# Patient Record
Sex: MALE | Race: WHITE | NOT HISPANIC OR LATINO
[De-identification: names, ages, dates, MRNs, and addresses within clinical notes are randomized per-mention and may not be internally consistent; named-entity substitution may affect disease eponyms.]

---

## 2017-01-31 ENCOUNTER — APPOINTMENT (OUTPATIENT)
Dept: COLORECTAL SURGERY | Facility: CLINIC | Age: 62
End: 2017-01-31

## 2017-02-14 ENCOUNTER — APPOINTMENT (OUTPATIENT)
Dept: COLORECTAL SURGERY | Facility: CLINIC | Age: 62
End: 2017-02-14

## 2017-03-02 ENCOUNTER — APPOINTMENT (OUTPATIENT)
Dept: COLORECTAL SURGERY | Facility: CLINIC | Age: 62
End: 2017-03-02

## 2017-04-04 ENCOUNTER — APPOINTMENT (OUTPATIENT)
Dept: COLORECTAL SURGERY | Facility: CLINIC | Age: 62
End: 2017-04-04

## 2019-02-12 ENCOUNTER — APPOINTMENT (OUTPATIENT)
Dept: COLORECTAL SURGERY | Facility: CLINIC | Age: 64
End: 2019-02-12
Payer: COMMERCIAL

## 2019-02-12 PROCEDURE — 99213 OFFICE O/P EST LOW 20 MIN: CPT | Mod: 25

## 2019-02-12 PROCEDURE — 46221 LIGATION OF HEMORRHOID(S): CPT

## 2019-02-12 RX ORDER — PRAMOXINE HYDROCHLORIDE AND HYDROCORTISONE ACETATE 10; 25 MG/G; MG/G
2.5-1 CREAM TOPICAL
Qty: 1 | Refills: 5 | Status: DISCONTINUED | COMMUNITY
Start: 2017-03-02 | End: 2019-02-12

## 2019-02-12 NOTE — ASSESSMENT
[FreeTextEntry1] : Prolapsing internal hemorrhoids\par -Rubber band ligation was performed on the right lateral internal hemorrhoid without complication\par -Restart fiber supplementation\par -Follow up in office in 2-3 weeks for additional banding

## 2019-02-19 ENCOUNTER — TRANSCRIPTION ENCOUNTER (OUTPATIENT)
Age: 64
End: 2019-02-19

## 2019-02-19 ENCOUNTER — APPOINTMENT (OUTPATIENT)
Dept: COLORECTAL SURGERY | Facility: CLINIC | Age: 64
End: 2019-02-19
Payer: COMMERCIAL

## 2019-02-19 DIAGNOSIS — K62.5 HEMORRHAGE OF ANUS AND RECTUM: ICD-10-CM

## 2019-02-19 PROCEDURE — 46221 LIGATION OF HEMORRHOID(S): CPT | Mod: 58

## 2019-02-19 PROCEDURE — 99213 OFFICE O/P EST LOW 20 MIN: CPT | Mod: 25

## 2019-02-19 RX ORDER — OMEPRAZOLE 20 MG/1
20 CAPSULE, DELAYED RELEASE ORAL
Qty: 90 | Refills: 0 | Status: ACTIVE | COMMUNITY
Start: 2019-01-24

## 2019-02-19 NOTE — ASSESSMENT
[FreeTextEntry1] : Bleeding and prolapsing internal hemorrhoids\par -Rubber band ligation was performed on the right lateral internal hemorrhoid more proximally to help prevent further prolapse.  This was performed in standard fashion without complication\par -Continue fiber supplement\par -Follow up in 2 weeks for reevaluation\par -Given partial prolapse, rubber band ligation may not completely treated the patient's hemorrhoidal symptoms. If no improvement, will consider formal hemorrhoidectomy

## 2019-02-19 NOTE — HISTORY OF PRESENT ILLNESS
[FreeTextEntry1] : 63-year-old male status post rubber band ligation. Patient reports rubber bands fell off somewhere on the second day. He reports further prolapse. Mild discomfort. No fevers or chills. Presents for evaluation

## 2019-10-12 ENCOUNTER — TRANSCRIPTION ENCOUNTER (OUTPATIENT)
Age: 64
End: 2019-10-12

## 2020-08-18 ENCOUNTER — APPOINTMENT (OUTPATIENT)
Dept: ORTHOPEDIC SURGERY | Facility: CLINIC | Age: 65
End: 2020-08-18
Payer: COMMERCIAL

## 2020-08-18 VITALS
HEART RATE: 74 BPM | DIASTOLIC BLOOD PRESSURE: 69 MMHG | BODY MASS INDEX: 25.03 KG/M2 | HEIGHT: 74 IN | WEIGHT: 195 LBS | SYSTOLIC BLOOD PRESSURE: 144 MMHG | TEMPERATURE: 97.7 F

## 2020-08-18 PROCEDURE — 99203 OFFICE O/P NEW LOW 30 MIN: CPT

## 2020-08-18 PROCEDURE — 73564 X-RAY EXAM KNEE 4 OR MORE: CPT | Mod: LT

## 2020-08-19 NOTE — PHYSICAL EXAM
[de-identified] : 63 y/o pleasant  male in NAD and AAOx3. 25 BMI. The pt has a mildly antalgic gait. Physical examination of both knees reveals normal contours, skin intact with no signs of infection, no erythema, no swelling, no effusion, no distal lymphedema or phlebitis, no patholaxity. ROM of the left knee reveals 0°-110° (pain around after 100 degrees). Strength is 5/5 within this arc of motion. There is pain on palpation to the medial joint line. There are no neurological deficits.\par  [de-identified] : X-rays were ordered, obtained, and interpreted by me today: 4 views of the left knee demonstrate mild diffuse tricompartmental osteoarthritis that is most pronounced in the patellofemoral joint with lateral patellar tilt and lateral PF joint space narrowing, with no acute fracture or dislocation.\par

## 2020-08-19 NOTE — DISCUSSION/SUMMARY
[de-identified] : 63 y/o male with left knee pain secondary to likely a medial meniscal tear.  A lengthy discussion was held regarding the patients condition and treatment options including all risks, benefits, prognosis and outcomes of each were discussed in detail. Based on his mechanism and description of symptoms he likely sustained a medial meniscal tear. I have advised on an MRI to further evaluate this. I will call him with the results of the MRI. We did have a hypothetical discussion today regarding addressing the tear arthroscopically. He is an avid boater and may hold off until around November to address surgically, should he have a meniscal tear as we suspect. The patient will contact me if there are any concerns. The patient expressed understanding and all questions were answered.\par

## 2020-08-19 NOTE — CONSULT LETTER
[Dear  ___] : Dear  [unfilled], [FreeTextEntry1] : I had the pleasure of evaluating your patient in the office today for complaints of left knee pain secondary to likely a meniscal tear. I have enclosed a copy of today's office notes for your charts and for your review.\par \par Sincerely, \par \par John Orozco M.D.\par Professor and \par Department of Orthopedic Surgery\par [facility] [place]\par

## 2020-08-19 NOTE — REVIEW OF SYSTEMS
[Joint Pain] : joint pain [Joint Swelling] : joint swelling [Negative] : Endocrine Anesthesia Volume In Cc (Will Not Render If 0): 0.5 Dressing: bandage Electrodesiccation And Curettage Text: The wound bed was treated with electrodesiccation and curettage after the biopsy was performed. Render Post-Care Instructions In Note?: no Was A Bandage Applied: Yes Hemostasis: Drysol Silver Nitrate Text: The wound bed was treated with silver nitrate after the biopsy was performed. Detail Level: Detailed Billing Type: Third-Party Bill Biopsy Method: Dermablade Lab: 6 Curettage Text: The wound bed was treated with curettage after the biopsy was performed. Post-Care Instructions: I reviewed with the patient in detail post-care instructions. Patient is to keep the biopsy site dry overnight, and then apply bacitracin twice daily until healed. Patient may apply hydrogen peroxide soaks to remove any crusting. Additional Anesthesia Volume In Cc (Will Not Render If 0): 0 Cryotherapy Text: The wound bed was treated with cryotherapy after the biopsy was performed. Anesthesia Type: 1% lidocaine with epinephrine Size Of Lesion In Cm: 0.4 Lab Facility: 3 Wound Care: Petrolatum Notification Instructions: Patient will be notified of biopsy results. However, patient instructed to call the office if not contacted within 2 weeks. Depth Of Biopsy: dermis Electrodesiccation Text: The wound bed was treated with electrodesiccation after the biopsy was performed. Consent: Written consent was obtained and risks were reviewed including but not limited to scarring, infection, bleeding, scabbing, incomplete removal, nerve damage and allergy to anesthesia. Biopsy Type: H and E Type Of Destruction Used: Curettage

## 2020-08-19 NOTE — HISTORY OF PRESENT ILLNESS
[de-identified] : 65 y/o male who is a retired  presents with left knee pain due to an injury about 6 weeks ago. He states that he twisted his knee during softball batting practice and while throwing a week later he aggravated his symptoms. He states that his symptoms improved and only has pain with certain movements.  He did take Aleve or Advil but has stopped taking it. He denies any numbness or tingling.

## 2020-08-24 ENCOUNTER — OUTPATIENT (OUTPATIENT)
Dept: OUTPATIENT SERVICES | Facility: HOSPITAL | Age: 65
LOS: 1 days | End: 2020-08-24
Payer: COMMERCIAL

## 2020-08-24 ENCOUNTER — APPOINTMENT (OUTPATIENT)
Dept: MRI IMAGING | Facility: CLINIC | Age: 65
End: 2020-08-24
Payer: COMMERCIAL

## 2020-08-24 ENCOUNTER — RESULT REVIEW (OUTPATIENT)
Age: 65
End: 2020-08-24

## 2020-08-24 DIAGNOSIS — S83.249A OTHER TEAR OF MEDIAL MENISCUS, CURRENT INJURY, UNSPECIFIED KNEE, INITIAL ENCOUNTER: ICD-10-CM

## 2020-08-24 PROCEDURE — 73721 MRI JNT OF LWR EXTRE W/O DYE: CPT | Mod: 26,LT

## 2020-08-24 PROCEDURE — 73721 MRI JNT OF LWR EXTRE W/O DYE: CPT

## 2020-09-08 ENCOUNTER — APPOINTMENT (OUTPATIENT)
Dept: ORTHOPEDIC SURGERY | Facility: CLINIC | Age: 65
End: 2020-09-08

## 2020-09-18 ENCOUNTER — APPOINTMENT (OUTPATIENT)
Dept: ORTHOPEDIC SURGERY | Facility: CLINIC | Age: 65
End: 2020-09-18
Payer: COMMERCIAL

## 2020-09-18 VITALS — TEMPERATURE: 97.6 F

## 2020-09-18 DIAGNOSIS — S83.249A OTHER TEAR OF MEDIAL MENISCUS, CURRENT INJURY, UNSPECIFIED KNEE, INITIAL ENCOUNTER: ICD-10-CM

## 2020-09-18 PROCEDURE — 20610 DRAIN/INJ JOINT/BURSA W/O US: CPT | Mod: LT

## 2020-09-18 PROCEDURE — 99213 OFFICE O/P EST LOW 20 MIN: CPT | Mod: 25

## 2020-09-18 NOTE — DISCUSSION/SUMMARY
[de-identified] : 63 y/o male with left knee pain secondary to complex tear of medial meniscus. The MRI was reviewed with the patient. A lengthy discussion was held regarding the patients condition and treatment options including all risks, benefits, prognosis and outcomes of each were discussed in detail. The patient elected to pursue conservative management at this time and elected to get a cortisone injection today. We discussed that the injections may be temporizing and he may ultimately require surgery at some point in the future. The patient will contact me if there are any concerns. Follow up will be prn. The patient expressed understanding and all questions were answered.

## 2020-09-18 NOTE — PHYSICAL EXAM
[de-identified] : 63 y/o pleasant  male in NAD and AAOx3. 25 BMI. The pt has a mildly antalgic gait. Physical examination of both knees reveals normal contours, skin intact with no signs of infection, no erythema, no swelling, no effusion, no distal lymphedema or phlebitis, no patholaxity. ROM of the left knee reveals 0°-110° (pain around after 100 degrees). Strength is 5/5 within this arc of motion. There is pain on palpation to the medial joint line. There are no neurological deficits.\par  [de-identified] : Previous X-rays were reviewed by me today: 4 views of the left knee demonstrate mild diffuse tricompartmental osteoarthritis that is most pronounced in the patellofemoral joint with lateral patellar tilt and lateral PF joint space narrowing, with no acute fracture or dislocation.\par \par Previous MRI Reviewed by med today ([facility], 8/24/20): complex tear of medial meniscus.

## 2020-09-18 NOTE — PROCEDURE
[de-identified] : After careful discussion with the patient regarding the risks versus benefits of a corticosteroid and local anesthetic injection, the patient has decided to proceed ahead with the treatment. After sterile preparation of the site, an injection has been given into the left knee using 8 cc of half percent Marcaine without epinephrine and 2 cc of triamcinolone. The site was cleaned and a band-aid was applied. The patient tolerated the injection without complication\par

## 2020-09-18 NOTE — CONSULT LETTER
[Dear  ___] : Dear  [unfilled], [FreeTextEntry1] : I had the pleasure of evaluating your patient in the office today for complaints of left knee pain secondary to a medial meniscus tear. A cortisone injection was administered to the patient's left knee today. I have enclosed a copy of today's office notes for your charts and for your review.\par \par Sincerely, \par \par John Orozco M.D.\par Professor and \par Department of Orthopedic Surgery\par [facility] [place]\par

## 2020-09-18 NOTE — HISTORY OF PRESENT ILLNESS
[de-identified] : 65 y/o male who is a retired  presents with left knee pain due to an injury and was seen and was found to have a possible meniscus tear. He was sent to obtain an MRI and is here today for the results. He does not have pain at this visit. He does not take any pain meds. He denies any numbness or tingling.

## 2021-02-08 NOTE — HISTORY OF PRESENT ILLNESS
[facility] ADMISSION NOTE:  Patient enrolled in the Transition program to assist with education and support during transition home from hospital. Transition home  will see patient at home within 48 hours of discharge and will follow up with patient in home and telephonically for 6 weeks post discharge under the [place] Model.       [FreeTextEntry1] : 63-year-old male with history of internal hemorrhoidal disease. Patient had been treated with rubber band ligation in the past with good results. Reports intermittent prolapse recently. Denies pain. Denies blood per rectum. No fevers or chills

## 2022-01-23 ENCOUNTER — NON-APPOINTMENT (OUTPATIENT)
Age: 67
End: 2022-01-23

## 2022-01-24 ENCOUNTER — OUTPATIENT (OUTPATIENT)
Dept: OUTPATIENT SERVICES | Facility: HOSPITAL | Age: 67
LOS: 1 days | End: 2022-01-24
Payer: MEDICARE

## 2022-01-24 ENCOUNTER — APPOINTMENT (OUTPATIENT)
Dept: MRI IMAGING | Facility: CLINIC | Age: 67
End: 2022-01-24
Payer: MEDICARE

## 2022-01-24 ENCOUNTER — APPOINTMENT (OUTPATIENT)
Dept: ORTHOPEDIC SURGERY | Facility: CLINIC | Age: 67
End: 2022-01-24
Payer: MEDICARE

## 2022-01-24 VITALS — DIASTOLIC BLOOD PRESSURE: 81 MMHG | SYSTOLIC BLOOD PRESSURE: 129 MMHG | HEART RATE: 67 BPM

## 2022-01-24 DIAGNOSIS — M51.36 OTHER INTERVERTEBRAL DISC DEGENERATION, LUMBAR REGION: ICD-10-CM

## 2022-01-24 PROCEDURE — 72148 MRI LUMBAR SPINE W/O DYE: CPT | Mod: 26,ME

## 2022-01-24 PROCEDURE — G1004: CPT

## 2022-01-24 PROCEDURE — 99214 OFFICE O/P EST MOD 30 MIN: CPT

## 2022-01-24 PROCEDURE — 72100 X-RAY EXAM L-S SPINE 2/3 VWS: CPT

## 2022-01-24 PROCEDURE — 72148 MRI LUMBAR SPINE W/O DYE: CPT | Mod: ME

## 2022-01-31 ENCOUNTER — APPOINTMENT (OUTPATIENT)
Dept: ORTHOPEDIC SURGERY | Facility: CLINIC | Age: 67
End: 2022-01-31
Payer: MEDICARE

## 2022-01-31 VITALS
WEIGHT: 195 LBS | DIASTOLIC BLOOD PRESSURE: 78 MMHG | SYSTOLIC BLOOD PRESSURE: 128 MMHG | HEART RATE: 69 BPM | HEIGHT: 74 IN | BODY MASS INDEX: 25.03 KG/M2

## 2022-01-31 PROCEDURE — 99214 OFFICE O/P EST MOD 30 MIN: CPT

## 2022-02-12 ENCOUNTER — EMERGENCY (EMERGENCY)
Facility: HOSPITAL | Age: 67
LOS: 1 days | Discharge: HOME | End: 2022-02-12
Attending: EMERGENCY MEDICINE
Payer: MEDICARE

## 2022-02-12 VITALS
OXYGEN SATURATION: 99 % | HEART RATE: 64 BPM | DIASTOLIC BLOOD PRESSURE: 93 MMHG | WEIGHT: 194.01 LBS | HEIGHT: 74 IN | TEMPERATURE: 98 F | SYSTOLIC BLOOD PRESSURE: 151 MMHG | RESPIRATION RATE: 18 BRPM

## 2022-02-12 VITALS
RESPIRATION RATE: 16 BRPM | OXYGEN SATURATION: 96 % | DIASTOLIC BLOOD PRESSURE: 98 MMHG | SYSTOLIC BLOOD PRESSURE: 141 MMHG | HEART RATE: 88 BPM

## 2022-02-12 PROCEDURE — 72148 MRI LUMBAR SPINE W/O DYE: CPT | Mod: 26,MA

## 2022-02-12 PROCEDURE — 99284 EMERGENCY DEPT VISIT MOD MDM: CPT | Mod: 25

## 2022-02-12 PROCEDURE — 72148 MRI LUMBAR SPINE W/O DYE: CPT | Mod: MA

## 2022-02-12 PROCEDURE — 99284 EMERGENCY DEPT VISIT MOD MDM: CPT | Mod: GC

## 2022-02-12 RX ORDER — SENNOSIDES 8.6 MG/1
8.6 TABLET, COATED ORAL
Qty: 30 | Refills: 0 | Status: ACTIVE | COMMUNITY
Start: 2022-02-12 | End: 1900-01-01

## 2022-02-12 NOTE — ED PROVIDER NOTE - OBJECTIVE STATEMENT
66M PMH lumbar stenosis, BPH, GERD complaining of 1 week of worsening sharp pain radiating down b/l anterior legs, sharp L groin pain, L foot numbness, L buttock numbness, associated with constipation (last BM 1 week ago) and worsening difficulty with urination (straining, decreased bladder emptying). Recent spinal injection 10 days ago. Denies fever, chills, saddle anesthesia, chest pain, SOB, cough, headache, dizziness, diarrhea, abdominal pain. 66M PMH lumbar stenosis, BPH, GERD complaining of 1 week of worsening sharp pain radiating down b/l anterior legs, sharp L groin pain, L foot numbness, L buttock numbness, associated with constipation (last BM 1 week ago) and worsening difficulty with urination (straining, decreased bladder emptying). Recent spinal injection 10 days ago. Denies fever, chills, saddle anesthesia, chest pain, SOB, cough, headache, dizziness, diarrhea, abdominal pain. Pt passing gas. No n/v.

## 2022-02-12 NOTE — ED PROVIDER NOTE - CLINICAL SUMMARY MEDICAL DECISION MAKING FREE TEXT BOX
66M PMH lumbar stenosis, BPH, GERD complaining of 1 week of worsening sharp pain radiating down b/l anterior legs, sharp L groin pain, L foot numbness, L buttock numbness, associated with constipation (last BM 1 week ago) and worsening difficulty with urination (straining, decreased bladder emptying). Given hx and physical will eval for cord compression with MRI. PT currently denying pain, does not want pain medication. 66M PMH lumbar stenosis, BPH, GERD complaining of 1 week of worsening sharp pain radiating down b/l anterior legs, sharp L groin pain, L foot numbness, L buttock numbness, associated with constipation (last BM 1 week ago) and worsening difficulty with urination (straining, decreased bladder emptying). Given hx and physical will eval for cord compression with MRI. PT currently denying pain, does not want pain medication. Not concerned for SBO given pt passing gas, soft NT abdomen, no n/v.

## 2022-02-12 NOTE — ED ADULT NURSE NOTE - OBJECTIVE STATEMENT
Pt is a 65 y/o male pmhx of laminectomy (2001) presents to the ED complaining of back pain & no BM x 1 week. Pt has had back pain since the fall, pain was originally sharp and localized but after going to the Chiropractor pain has improved and is now in the side of hips and "causes a pulling pain down my thighs when I walk that is causing my left foot to be numb". Out patient MRI showed chronic compression deformity of L5 & spondylosis. He then had injections done 10 days ago and then in 7 days has not had a BM. Endorses worsening urinary hesitancy. He denies feeling bloated or having abdominal pain. Pt presents alert & oriented x 4, calm, able to follow commands, speech clear. Breathing spontaneous & nonlabored. Abdomen soft & nondistended. Strength 5/5 x 4 extremities, ambulates without assist. Strong peripheral pulses noted b/l, no edema noted. Skin warm, clean, dry & intact. Denies chest pain, SOB, abdominal pain, n/v/d, fever, chills, changes in vision. Call bell within reach, bed in lowest position, side rails up, wheels locked.

## 2022-02-12 NOTE — ED PROVIDER NOTE - PHYSICAL EXAMINATION
GENERAL: Awake. Alert. NAD. Well nourished.  HEENT: NC/AT, PERRL, EOMI, Conjunctiva pink, no scleral icterus. Airway patent. Moist mucous membranes.  LUNGS: CTAB. No wheezes or rales noted.  CARDIAC: Chest non-tender to palpation. RRR. S1 and S2 intact. No murmurs noted.  ABDOMEN:  No masses noted. Soft, NT, ND, no rebound, no guarding.  BACK: No midline spinal tenderness  EXT: No edema, no calf tenderness, distal pulses 2+ bilaterally,   NEURO: A&Ox3. Moving all extremities. Strength intact throughout. Negative straight leg test. No focal deficits. Decreased sensation at L foot and L buttocks. No saddle anesthesia.   SKIN: Warm and dry.  PSYCH: Normal affect.

## 2022-02-12 NOTE — ED PROVIDER NOTE - CARE PROVIDER_API CALL
Jay Kiran (MD; DC)  Orthopaedic Surgery  [address], [address]  [place] [address]  Phone: [phone]  Fax: [phone]  Follow Up Time: 1-3 Days

## 2022-02-12 NOTE — ED ADULT TRIAGE NOTE - CHIEF COMPLAINT QUOTE
progressively worsening lower back pain with constipation, last bowel movement approx 7 days ago. Pt noted to have received injection in back yesterday for pain and since has also noted intermittent numbness and tingling in buttock area.

## 2022-02-12 NOTE — CONSULT NOTE ADULT - ATTENDING COMMENTS
Agree with above. The patient is a 66 year old male with back/leg symptoms. He has been dealing with radicular type pain. He denies any urinary incontinence. He does have a previous history of hemorroids but no aneta perianal anesthesia. He has not had a BM in 7 days although he has been able to pass gas.    He is neurologically intact. His sensation is nml and he is 5/5 in his LE    I did discuss with him his current MRI findings which does show severe spinal stenosis at L1-L2. I offered to admit him and keep him under close observation for the next 24 hours. He declined this. We also discussed immediate surgery for this current pathology. He declined this as well. He would like to go home. We will send him home on a prednisone taper. He will have close clinical follow up in the office with Dr. Kiran this week. I did give him my direct contact information and counseled him that he can reach out to my at any time if he has any new or worsening symptoms.    All questions were answered.

## 2022-02-12 NOTE — ED PROVIDER NOTE - PROGRESS NOTE DETAILS
Connie Faustin DO (PGY1): Rectal exam chaperoned by Dr. Couch. Normal rectal tone. Elizabeth Mayes MD, PGY1    Patient signed out to me - will follow up MRI results Elizabeth Mayes MD, PGY1    MRI called as patient was not taken at 9AM as was signed out. Radiology called to expedite patient. Elizabeth Mayes MD, PGY1    MRI unchanged - spoke to on call physician at Dr. Kiran's (patient's spine surgeon). Patient can call to follow up Monday morning. Elizabeth Mayes MD, PGY1    Spoke to patient's nephew who is an orthopedic surgeon - requesting Spine consult. Ortho to see. No acute intervention as per spine    Elizabeth Mayes MD, PGY1

## 2022-02-12 NOTE — ED PROVIDER NOTE - NSFOLLOWUPINSTRUCTIONS_ED_ALL_ED_FT
You were seen in the Emergency Department for back pain. Your MRI was negative for changes since your last MRI. Please follow up with your spine surgeon, Dr. Kiran on Monday with an appointment. There is no acute intervention at this time. Take Tylenol 650 mg or Motrin 600 mg for pain. Please return to the Emergency Department if you have increasing difficulty urinating, defecating, increased numbness or tingling. You were seen in the Emergency Department for back pain. Your MRI was negative for changes since your last MRI. There is no acute intervention indicated, as per the spine surgeons. Please follow up with your spine surgeon, Dr. Kiran on Monday with an appointment. Take Tylenol 650 mg or Motrin 600 mg for pain. Please return to the Emergency Department if you have increasing difficulty urinating, defecating, increased numbness or tingling.    [facility]  Orthopedic Surgery  [address] [place] [address]  Phone: [phone]  Fax:   Follow Up Time:     [facility]  Orthopedic Surgery  [address], 3rd & 4th [place] [address]  Phone: [phone]  Fax:   Follow Up Time:     [facility]  Orthopedics  .  [place]   Phone: [phone]  Fax:   Follow Up Time:     [facility]  Orthopedics  [address] [place] [address]  Phone: [phone]  Fax: [phone]  Follow Up Time:     [facility]  Orthopedics  [address] [place] [address]  Phone: [phone]  Fax:   Follow Up Time:     Back Pain  WHAT YOU NEED TO KNOW:  Back pain is common. It can be caused by many conditions, such as arthritis or the breakdown of spinal discs. Your risk for back pain is increased by injuries, lack of activity, or repeated bending and twisting. You may feel sore or stiff on one or both sides of your back. The pain may spread to your buttocks or thighs.  DISCHARGE INSTRUCTIONS:  Return to the emergency department if:   •You have pain, numbness, or weakness in one or both legs.  •Your pain becomes so severe that you cannot walk.  •You cannot control your urine or bowel movements.  •You have severe back pain with chest pain.  •You have severe back pain, nausea, and vomiting.  •You have severe back pain that spreads to your side or genital area.  Contact your healthcare provider if:   •You have back pain that does not get better with rest and pain medicine.  •You have a fever.  •You have pain that worsens when you are on your back or when you rest.  •You have pain that worsens when you cough or sneeze.  •You lose weight without trying.  •You have questions or concerns about your condition or care.  Medicines:   •NSAIDs help decrease swelling and pain. This medicine is available with or without a doctor's order. NSAIDs can cause stomach bleeding or kidney problems in certain people. If you take blood thinner medicine, always ask your healthcare provider if NSAIDs are safe for you. Always read the medicine label and follow directions.  •Acetaminophen decreases pain and fever. It is available without a doctor's order. Ask how much to take and how often to take it. Follow directions. Read the labels of all other medicines you are using to see if they also contain acetaminophen, or ask your doctor or pharmacist. Acetaminophen can cause liver damage if not taken correctly. Do not use more than 4 grams (4,000 milligrams) total of acetaminophen in one day.   •Muscle relaxers help decrease muscle spasms and back pain.  •Prescription pain medicine may be given. Ask your healthcare provider how to take this medicine safely. Some prescription pain medicines contain acetaminophen. Do not take other medicines that contain acetaminophen without talking to your healthcare provider. Too much acetaminophen may cause liver damage. Prescription pain medicine may cause constipation. Ask your healthcare provider how to prevent or treat constipation.   •Take your medicine as directed. Contact your healthcare provider if you think your medicine is not helping or if you have side effects. Tell him or her if you are allergic to any medicine. Keep a list of the medicines, vitamins, and herbs you take. Include the amounts, and when and why you take them. Bring the list or the pill bottles to follow-up visits. Carry your medicine list with you in case of an emergency.  How to manage your back pain:   •Apply ice on your back for 15 to 20 minutes every hour or as directed. Use an ice pack, or put crushed ice in a plastic bag. Cover it with a towel before you apply it to your skin. Ice helps prevent tissue damage and decreases pain.  •Apply heat on your back for 20 to 30 minutes every 2 hours for as many days as directed. Heat helps decrease pain and muscle spasms.  •Stay active as much as you can without causing more pain. Bed rest could make your back pain worse. Avoid heavy lifting until your pain is gone.  •Go to physical therapy as directed. A physical therapist can teach you exercises to help improve movement and strength, and to decrease pain.  Follow up with your healthcare provider in 2 weeks, or as directed: Write down your questions so you remember to ask them during your visits.

## 2022-02-12 NOTE — CONSULT NOTE ADULT - TIME BILLING
Please note that over 70 minutes of time was spent in care of this patient. This includes several discussions with providers in the ED, residents, attendings. I also spent 2 separate encounters with the patient to discuss his findings/imaging/exam. I also discussed with him at length his plan of care. A signficant portion of time was also spent in documentation, previsit preparation, post visit documentation.

## 2022-02-12 NOTE — ED PROVIDER NOTE - PATIENT PORTAL LINK FT
You can access the FollowMyHealth Patient Portal offered by [facility] by registering at the following website: [email or web address]. By joining [employer]’s FollowMyHealth portal, you will also be able to view your health information using other applications (apps) compatible with our system. You can access the FollowMyHealth Patient Portal offered by [facility] by registering at the following website: [email or web address]. By joining [employer]’s FollowMyHealth portal, you will also be able to view your health information using other applications (apps) compatible with our system.

## 2022-02-12 NOTE — CONSULT NOTE ADULT - SUBJECTIVE AND OBJECTIVE BOX
Patient is a 66y Male who presents c/o constipation for the past 7 days and decreased perianal sensation. Seen by Dr. Kiran in the office, received an epidural injection 2/1/12 with minimal relief. Denies any traumatic injury. Denies pain/injury elsewhere. Denies  motor weakness. Denies bowel/bladder incontinence. Denies fevers/chills. No other complaints at this time. Patient seen/examined by Dr. Murray personally at bedside    HEALTH ISSUES - PROBLEM Dx:          MEDICATIONS  (STANDING):      Allergies    penicillin (Hives)    Intolerances        PAST MEDICAL & SURGICAL HISTORY:  Benign prostatic hyperplasia          Vital Signs Last 24 Hrs  T(C): 36.4 (02-12-22 @ 06:10), Max: 36.8 (02-12-22 @ 02:28)  T(F): 97.6 (02-12-22 @ 06:10), Max: 98.2 (02-12-22 @ 02:28)  HR: 88 (02-12-22 @ 10:00) (62 - 88)  BP: 141/98 (02-12-22 @ 10:00) (133/80 - 160/85)  BP(mean): 98 (02-12-22 @ 06:10) (98 - 110)  RR: 16 (02-12-22 @ 10:00) (16 - 18)  SpO2: 96% (02-12-22 @ 10:00) (96% - 99%)    Gen: NAD  Spine PE:  Skin intact  No gross deformity  No midline TTP C/T/L/S spine  No bony step offs  No paraspinal muscle ttp/hypertonicity   Negative clonus  Negative babinski  Negative norris  Rectal sensation present; however, altered  Good rectal tone    Motor:                   C5                C6              C7               C8           T1   R            5/5                5/5            5/5             5/5          5/5  L             5/5               5/5             5/5             5/5          5/5                L2             L3             L4               L5            S1  R         5/5           5/5          5/5             5/5           5/5  L          5/5          5/5           5/5             5/5           5/5    Sensory:            C5         C6         C7      C8       T1        (0=absent, 1=impaired, 2=normal, NT=not testable)  R         2            2           2        2         2  L          2            2           2        2         2               L2          L3         L4      L5       S1         (0=absent, 1=impaired, 2=normal, NT=not testable)  R         2            2            2        2        2  L          2            2           2        2         2                  Imaging:   MR LSp: Showing no acute changes, L1-L2 spinal stenosis, disc bulges L1-L5, L5 VCF    A/P: 66y Male with unchanged lumbar spinal stenosis, L5 VCF    Overall reassuring clinical exam: 5/5 motor strength, SILT to extremities, however does have altered perianal sensation and constipation. Advised patient to closely monitor and if he develops any symptoms of urinary/fecal incontinence or aneta perineal anesthesia should come immediately to the ER. Patient verbalized understanding and understands return precautions  Pain control  WBAT with assistive devices as needed  FU Labs/imaging  SCDs  F/u in office with Dr. Kiran   Will d/w Dr. Murray and advise of any changes

## 2022-02-14 ENCOUNTER — APPOINTMENT (OUTPATIENT)
Dept: ORTHOPEDIC SURGERY | Facility: CLINIC | Age: 67
End: 2022-02-14
Payer: MEDICARE

## 2022-02-14 VITALS
WEIGHT: 195 LBS | DIASTOLIC BLOOD PRESSURE: 75 MMHG | HEIGHT: 74 IN | BODY MASS INDEX: 25.03 KG/M2 | HEART RATE: 71 BPM | SYSTOLIC BLOOD PRESSURE: 119 MMHG

## 2022-02-14 PROBLEM — N40.0 BENIGN PROSTATIC HYPERPLASIA WITHOUT LOWER URINARY TRACT SYMPTOMS: Chronic | Status: ACTIVE | Noted: 2022-02-12

## 2022-02-14 PROCEDURE — 99215 OFFICE O/P EST HI 40 MIN: CPT

## 2022-02-18 ENCOUNTER — NON-APPOINTMENT (OUTPATIENT)
Age: 67
End: 2022-02-18

## 2022-02-23 ENCOUNTER — APPOINTMENT (OUTPATIENT)
Dept: ORTHOPEDIC SURGERY | Facility: CLINIC | Age: 67
End: 2022-02-23
Payer: MEDICARE

## 2022-02-23 VITALS
SYSTOLIC BLOOD PRESSURE: 109 MMHG | HEART RATE: 82 BPM | DIASTOLIC BLOOD PRESSURE: 76 MMHG | HEIGHT: 74 IN | WEIGHT: 195 LBS | BODY MASS INDEX: 25.03 KG/M2 | OXYGEN SATURATION: 96 %

## 2022-02-23 PROCEDURE — 99215 OFFICE O/P EST HI 40 MIN: CPT

## 2022-02-23 NOTE — ASSESSMENT
[FreeTextEntry1] : I had a long discussion with the patient regards to his treatment plan and diagnosis.  I do think that he is dealing with lumbar radiculopathy, lumbar neurogenic claudication.  He has tried and failed conservative management including prednisone, Tylenol, epidural steroid injection.  He is continuing dealing with significant symptoms.  His MRI does show severe spinal stenosis L1-L4.  At this point I would recommend to him in a lumbar decompression surgery, L1-L4.  He would like to get another opinion which I think is reasonable.  I have counseled him that we can hold the date of March time for surgery but if he decides to proceed with another surgeon that is fine.  I did give him my direct contact information I encouraged him to reach out to me at any point if he has any new or worsening symptoms.

## 2022-02-23 NOTE — HISTORY OF PRESENT ILLNESS
[de-identified] : This is a 66-year-old male with a known history of lumbar spinal stenosis.  He presents with significant pain in his legs.  The majority of his pain is above his knee.  He does feel fatigued when he walks for prolonged amount of time.  He also has numbness over his left groin.  He also has numbness over his left foot.  He does occasionally have feelings that it is hard for him to have a bowel movement.  Thankfully he is able to control when he starts to stop urination.  He has no numbness over his penis or anus today.  He is able to walk he has no focal neurologic deficits

## 2022-02-23 NOTE — PHYSICAL EXAM
[de-identified] : Lumbar radiographs\par Coronal degenerative scoliosis noted\par No severe spondylolisthesis\par \par Lumbar MRI\par Severe spinal stenosis at L1-L2\par L2-L4 with moderate to severe areas of spinal stenosis [de-identified] : Lumbar Physical Exam\par \par Gait - Normal\par \par Station - Normal\par \par Sagittal balance - Normal\par \par Compensatory mechanism? - None\par \par Heel walk - Normal\par \par Toe walk - Normal\par \par Reflexes\par Patellar - normal\par Gastroc - normal\par Clonus - No\par \par Hip Exam - Normal\par \par Straight leg raise - none\par \par Pulses - 2+ dp/pt\par \par Range of motion - normal\par \par Sensation \par Sensation intact to light touch in L1, L2, L3, L4, L5 and S1 dermatomes bilaterally, left foot numbness\par \par Motor\par 	IP	Quad	HS	TA	Gastroc	EHL\par Right	5/5	5/5	5/5	5/5	5/5	5/5\par Left	5/5	5/5	5/5	5/5	5/5	5/5

## 2022-02-24 ENCOUNTER — APPOINTMENT (OUTPATIENT)
Dept: COLORECTAL SURGERY | Facility: CLINIC | Age: 67
End: 2022-02-24
Payer: MEDICARE

## 2022-02-24 DIAGNOSIS — K64.5 PERIANAL VENOUS THROMBOSIS: ICD-10-CM

## 2022-02-24 DIAGNOSIS — K62.89 OTHER SPECIFIED DISEASES OF ANUS AND RECTUM: ICD-10-CM

## 2022-02-24 DIAGNOSIS — K64.9 UNSPECIFIED HEMORRHOIDS: ICD-10-CM

## 2022-02-24 PROCEDURE — 99213 OFFICE O/P EST LOW 20 MIN: CPT | Mod: 25

## 2022-02-24 PROCEDURE — 46600 DIAGNOSTIC ANOSCOPY SPX: CPT

## 2022-02-24 NOTE — ASSESSMENT
[FreeTextEntry1] : Resolving thrombosed external hemorrhoid\par -Patient currently asymptomatic and has started conservative therapy\par -Continue fiber supplementation daily\par -Hydrocortisone cream as needed\par -Sitz bath as needed\par -Follow up if worsening symptoms

## 2022-02-24 NOTE — HISTORY OF PRESENT ILLNESS
[FreeTextEntry1] : 66-year-old male is status post rubber band ligation approximately 3 years ago. Patient had been progressing well. Worsening back pain has been noted he underwent a spinal for pain control on February 1. After that time, he reports severe constipation with perianal swelling and pain. He denies blood per rectum no abdominal discomfort no fevers or chills. The perianal swelling worsened with his first bowel movement. He started taking fiber supplementation and feels significantly better presents today for evaluation

## 2022-03-02 ENCOUNTER — OUTPATIENT (OUTPATIENT)
Dept: OUTPATIENT SERVICES | Facility: HOSPITAL | Age: 67
LOS: 1 days | End: 2022-03-02
Payer: MEDICARE

## 2022-03-02 ENCOUNTER — APPOINTMENT (OUTPATIENT)
Dept: SPINE | Facility: CLINIC | Age: 67
End: 2022-03-02
Payer: MEDICARE

## 2022-03-02 VITALS
WEIGHT: 197.09 LBS | HEIGHT: 74 IN | OXYGEN SATURATION: 96 % | TEMPERATURE: 99 F | RESPIRATION RATE: 18 BRPM | DIASTOLIC BLOOD PRESSURE: 82 MMHG | HEART RATE: 85 BPM | SYSTOLIC BLOOD PRESSURE: 121 MMHG

## 2022-03-02 VITALS
WEIGHT: 195 LBS | BODY MASS INDEX: 25.03 KG/M2 | SYSTOLIC BLOOD PRESSURE: 130 MMHG | HEIGHT: 74 IN | HEART RATE: 72 BPM | OXYGEN SATURATION: 98 % | DIASTOLIC BLOOD PRESSURE: 87 MMHG

## 2022-03-02 DIAGNOSIS — M54.16 RADICULOPATHY, LUMBAR REGION: ICD-10-CM

## 2022-03-02 DIAGNOSIS — Z98.890 OTHER SPECIFIED POSTPROCEDURAL STATES: Chronic | ICD-10-CM

## 2022-03-02 DIAGNOSIS — Z01.818 ENCOUNTER FOR OTHER PREPROCEDURAL EXAMINATION: ICD-10-CM

## 2022-03-02 DIAGNOSIS — Z29.9 ENCOUNTER FOR PROPHYLACTIC MEASURES, UNSPECIFIED: ICD-10-CM

## 2022-03-02 LAB
ANION GAP SERPL CALC-SCNC: 12 MMOL/L — SIGNIFICANT CHANGE UP (ref 5–17)
BLD GP AB SCN SERPL QL: NEGATIVE — SIGNIFICANT CHANGE UP
BUN SERPL-MCNC: 24 MG/DL — HIGH (ref 7–23)
CALCIUM SERPL-MCNC: 9 MG/DL — SIGNIFICANT CHANGE UP (ref 8.4–10.5)
CHLORIDE SERPL-SCNC: 107 MMOL/L — SIGNIFICANT CHANGE UP (ref 96–108)
CO2 SERPL-SCNC: 23 MMOL/L — SIGNIFICANT CHANGE UP (ref 22–31)
CREAT SERPL-MCNC: 0.73 MG/DL — SIGNIFICANT CHANGE UP (ref 0.5–1.3)
EGFR: 101 ML/MIN/1.73M2 — SIGNIFICANT CHANGE UP
GLUCOSE SERPL-MCNC: 114 MG/DL — HIGH (ref 70–99)
HCT VFR BLD CALC: 38.2 % — LOW (ref 39–50)
HGB BLD-MCNC: 12.5 G/DL — LOW (ref 13–17)
MCHC RBC-ENTMCNC: 28.5 PG — SIGNIFICANT CHANGE UP (ref 27–34)
MCHC RBC-ENTMCNC: 32.7 GM/DL — SIGNIFICANT CHANGE UP (ref 32–36)
MCV RBC AUTO: 87.2 FL — SIGNIFICANT CHANGE UP (ref 80–100)
NRBC # BLD: 0 /100 WBCS — SIGNIFICANT CHANGE UP (ref 0–0)
PLATELET # BLD AUTO: 194 K/UL — SIGNIFICANT CHANGE UP (ref 150–400)
POTASSIUM SERPL-MCNC: 4.1 MMOL/L — SIGNIFICANT CHANGE UP (ref 3.5–5.3)
POTASSIUM SERPL-SCNC: 4.1 MMOL/L — SIGNIFICANT CHANGE UP (ref 3.5–5.3)
RBC # BLD: 4.38 M/UL — SIGNIFICANT CHANGE UP (ref 4.2–5.8)
RBC # FLD: 13.7 % — SIGNIFICANT CHANGE UP (ref 10.3–14.5)
RH IG SCN BLD-IMP: NEGATIVE — SIGNIFICANT CHANGE UP
SODIUM SERPL-SCNC: 142 MMOL/L — SIGNIFICANT CHANGE UP (ref 135–145)
WBC # BLD: 5.98 K/UL — SIGNIFICANT CHANGE UP (ref 3.8–10.5)
WBC # FLD AUTO: 5.98 K/UL — SIGNIFICANT CHANGE UP (ref 3.8–10.5)

## 2022-03-02 PROCEDURE — 86900 BLOOD TYPING SEROLOGIC ABO: CPT

## 2022-03-02 PROCEDURE — 80048 BASIC METABOLIC PNL TOTAL CA: CPT

## 2022-03-02 PROCEDURE — 85027 COMPLETE CBC AUTOMATED: CPT

## 2022-03-02 PROCEDURE — G0463: CPT

## 2022-03-02 PROCEDURE — 87640 STAPH A DNA AMP PROBE: CPT

## 2022-03-02 PROCEDURE — 99204 OFFICE O/P NEW MOD 45 MIN: CPT

## 2022-03-02 PROCEDURE — 86901 BLOOD TYPING SEROLOGIC RH(D): CPT

## 2022-03-02 PROCEDURE — 86850 RBC ANTIBODY SCREEN: CPT

## 2022-03-02 PROCEDURE — 87641 MR-STAPH DNA AMP PROBE: CPT

## 2022-03-02 RX ORDER — VANCOMYCIN HCL 1 G
1500 VIAL (EA) INTRAVENOUS ONCE
Refills: 0 | Status: DISCONTINUED | OUTPATIENT
Start: 2022-03-10 | End: 2022-03-12

## 2022-03-02 NOTE — H&P PST ADULT - ATTENDING COMMENTS
Agree with above. The patient has lumbar radiculopathy/lumbar neurogenic claudication in the setting of severe spinal stenosis L1-L4. We will proceed with a L1-L4 decompression procedure. Risks and benefits were described to the patient in great detail. These risks did include pain, non resolution of symptoms, injury to surrounding nerves/arteries/veins, dural tear, csf leak, reherniation, nerve root injury, dvt/pe, infection and other risks. The patient did agree to proceed with the planned procedure and proper informed consent was obtained.

## 2022-03-02 NOTE — H&P PST ADULT - HISTORY OF PRESENT ILLNESS
This is a 67 y/o male PMH BPH, GERD with esophageal narrowing, S/P esophageal dilation with resolution of dysphagia, lumbar disc disease, S/P lumbar laminectomy, 11/2021 started having progressively worsening pain in the hips and anterior thighs, has left foot drop.  Presents today for L1-L4 decompression.

## 2022-03-02 NOTE — H&P PST ADULT - NSICDXPASTMEDICALHX_GEN_ALL_CORE_FT
PAST MEDICAL HISTORY:  Benign prostatic hyperplasia     GERD (gastroesophageal reflux disease)     Melanoma     Moderate mitral regurgitation     MVP (mitral valve prolapse)

## 2022-03-02 NOTE — H&P PST ADULT - REASON FOR ADMISSION
I'm having a decompression of my lumbar spine. I'm having a decompression of my lumbar spine. Goal of care: to have less pain.

## 2022-03-02 NOTE — H&P PST ADULT - NSICDXPASTSURGICALHX_GEN_ALL_CORE_FT
PAST SURGICAL HISTORY:  S/P hernia repair     S/P lumbar laminectomy     Status post dilation of esophageal narrowing

## 2022-03-02 NOTE — H&P PST ADULT - FALL HARM RISK - HARM RISK INTERVENTIONS

## 2022-03-02 NOTE — H&P PST ADULT - ASSESSMENT
LAVINIAI VTE 2.0 SCORE [CLOT updated 2019]    AGE RELATED RISK FACTORS                                                       MOBILITY RELATED FACTORS  [ ] Age 41-60 years                                            (1 Point)                    [ ] Bed rest                                                        (1 Point)  [ x] Age: 61-74 years                                           (2 Points)                  [ ] Plaster cast                                                   (2 Points)  [ ] Age= 75 years                                              (3 Points)                    [ ] Bed bound for more than 72 hours                 (2 Points)    DISEASE RELATED RISK FACTORS                                               GENDER SPECIFIC FACTORS  [ ] Edema in the lower extremities                       (1 Point)              [ ] Pregnancy                                                     (1 Point)  [ ] Varicose veins                                               (1 Point)                     [ ] Post-partum < 6 weeks                                   (1 Point)             [ x] BMI > 25 Kg/m2                                            (1 Point)                     [ ] Hormonal therapy  or oral contraception          (1 Point)                 [ ] Sepsis (in the previous month)                        (1 Point)               [ ] History of pregnancy complications                 (1 point)  [ ] Pneumonia or serious lung disease                                               [ ] Unexplained or recurrent                     (1 Point)           (in the previous month)                               (1 Point)  [ ] Abnormal pulmonary function test                     (1 Point)                 SURGERY RELATED RISK FACTORS  [ ] Acute myocardial infarction                              (1 Point)               [ ]  Section                                             (1 Point)  [ ] Congestive heart failure (in the previous month)  (1 Point)      [ ] Minor surgery                                                  (1 Point)   [ ] Inflammatory bowel disease                             (1 Point)               [ ] Arthroscopic surgery                                        (2 Points)  [ ] Central venous access                                      (2 Points)                [x ] General surgery lasting more than 45 minutes (2 points)  [ ] Malignancy- Present or previous                   (2 Points)                [ ] Elective arthroplasty                                         (5 points)    [ ] Stroke (in the previous month)                          (5 Points)                                                                                                                                                           HEMATOLOGY RELATED FACTORS                                                 TRAUMA RELATED RISK FACTORS  [ ] Prior episodes of VTE                                     (3 Points)                [ ] Fracture of the hip, pelvis, or leg                       (5 Points)  [ ] Positive family history for VTE                         (3 Points)             [ ] Acute spinal cord injury (in the previous month)  (5 Points)  [ ] Prothrombin 04087 A                                     (3 Points)               [ ] Paralysis  (less than 1 month)                             (5 Points)  [ ] Factor V Leiden                                             (3 Points)                  [ ] Multiple Trauma within 1 month                        (5 Points)  [ ] Lupus anticoagulants                                     (3 Points)                                                           [ ] Anticardiolipin antibodies                               (3 Points)                                                       [ ] High homocysteine in the blood                      (3 Points)                                             [ ] Other congenital or acquired thrombophilia      (3 Points)                                                [ ] Heparin induced thrombocytopenia                  (3 Points)                                     Total Score [      5    ]

## 2022-03-03 LAB
MRSA PCR RESULT.: SIGNIFICANT CHANGE UP
S AUREUS DNA NOSE QL NAA+PROBE: SIGNIFICANT CHANGE UP

## 2022-03-04 PROBLEM — K21.9 GASTRO-ESOPHAGEAL REFLUX DISEASE WITHOUT ESOPHAGITIS: Chronic | Status: ACTIVE | Noted: 2022-03-02

## 2022-03-04 PROBLEM — I34.1 NONRHEUMATIC MITRAL (VALVE) PROLAPSE: Chronic | Status: ACTIVE | Noted: 2022-03-02

## 2022-03-04 PROBLEM — C43.9 MALIGNANT MELANOMA OF SKIN, UNSPECIFIED: Chronic | Status: ACTIVE | Noted: 2022-03-02

## 2022-03-04 PROBLEM — I34.0 NONRHEUMATIC MITRAL (VALVE) INSUFFICIENCY: Chronic | Status: ACTIVE | Noted: 2022-03-02

## 2022-03-07 ENCOUNTER — OUTPATIENT (OUTPATIENT)
Dept: OUTPATIENT SERVICES | Facility: HOSPITAL | Age: 67
LOS: 1 days | End: 2022-03-07

## 2022-03-07 ENCOUNTER — APPOINTMENT (OUTPATIENT)
Dept: ORTHOPEDIC SURGERY | Facility: CLINIC | Age: 67
End: 2022-03-07
Payer: MEDICARE

## 2022-03-07 VITALS — HEIGHT: 74 IN | BODY MASS INDEX: 25.03 KG/M2 | WEIGHT: 195 LBS

## 2022-03-07 DIAGNOSIS — Z98.890 OTHER SPECIFIED POSTPROCEDURAL STATES: Chronic | ICD-10-CM

## 2022-03-07 DIAGNOSIS — Z11.52 ENCOUNTER FOR SCREENING FOR COVID-19: ICD-10-CM

## 2022-03-07 LAB — SARS-COV-2 RNA SPEC QL NAA+PROBE: SIGNIFICANT CHANGE UP

## 2022-03-07 PROCEDURE — 99215 OFFICE O/P EST HI 40 MIN: CPT

## 2022-03-07 RX ORDER — SENNOSIDES 8.6 MG/1
8.6 CAPSULE, GELATIN COATED ORAL
Qty: 14 | Refills: 0 | Status: ACTIVE | COMMUNITY
Start: 2022-03-07 | End: 1900-01-01

## 2022-03-07 NOTE — ASSESSMENT
[FreeTextEntry1] : This is a 66-year-old male with known history of lumbar radiculopathy/lumbar neurogenic claudication.  He does have severe spinal stenosis from L1-L4.  At this point we will proceed with an L1-L4 decompression.  Patient benefits of the procedure were explained to the patient in great detail. He did agree to proceed with the procedure as planned.  Please note specific discussion was had with the patient in regards to risk of this procedure.  These risk included but not limited to pain, need for reoperation, nonresolution of symptoms, nerve root injury, paralysis, CSF leak, dural tear, DVT, PE, infection as well as other risk.  The patient did agree to proceed with the procedure as planned.  Proper informed consent was obtained.  We also discussed the postoperative recovery course.  It would likely entail 1 to 2 days in the hospital.  All questions were answered.  Please note that over 40 minutes of time spent in care of this patient which includes previsit preparation, in person visit, post visit documentation, discussion with colleagues. No heavy lifting/straining

## 2022-03-07 NOTE — PHYSICAL EXAM
[de-identified] : Lumbar Physical Exam\par \par Gait - Normal\par \par Station - Normal\par \par Sagittal balance - Normal\par \par Compensatory mechanism? - None\par \par Heel walk - Normal\par \par Toe walk - Normal\par \par Reflexes\par Patellar - normal\par Gastroc - normal\par Clonus - No\par \par Hip Exam - Normal\par \par Straight leg raise - none\par \par Pulses - 2+ dp/pt\par \par Range of motion - normal\par \par Sensation \par Sensation intact to light touch in L1, L2, L3, L4, L5 and S1 dermatomes bilaterally, left foot numbness\par \par Motor\par 	IP	Quad	HS	TA	Gastroc	EHL\par Right	5/5	5/5	5/5	5/5	5/5	5/5\par Left	5/5	5/5	5/5	5/5	5/5	5/5

## 2022-03-07 NOTE — HISTORY OF PRESENT ILLNESS
[de-identified] : Today the patient states that he still dealing with bilateral leg symptoms and back pain.  He does state that majority of his pain is above his knees.  He denies any bowel bladder issues which are new.  He denies any saddle anesthesia.  He denies any episodes of incontinence.  He is here today to discuss surgical treatment.\par \par 02/23/22\par This is a 66-year-old male with a known history of lumbar spinal stenosis.  He presents with significant pain in his legs.  The majority of his pain is above his knee.  He does feel fatigued when he walks for prolonged amount of time.  He also has numbness over his left groin.  He also has numbness over his left foot.  He does occasionally have feelings that it is hard for him to have a bowel movement.  Thankfully he is able to control when he starts to stop urination.  He has no numbness over his penis or anus today.  He is able to walk he has no focal neurologic deficits

## 2022-03-09 ENCOUNTER — TRANSCRIPTION ENCOUNTER (OUTPATIENT)
Age: 67
End: 2022-03-09

## 2022-03-10 ENCOUNTER — INPATIENT (INPATIENT)
Facility: HOSPITAL | Age: 67
LOS: 1 days | Discharge: HOME | DRG: 519 | End: 2022-03-12
Attending: GENERAL PRACTICE | Admitting: GENERAL PRACTICE
Payer: MEDICARE

## 2022-03-10 ENCOUNTER — APPOINTMENT (OUTPATIENT)
Dept: ORTHOPEDIC SURGERY | Facility: HOSPITAL | Age: 67
End: 2022-03-10

## 2022-03-10 VITALS — HEIGHT: 74 IN | WEIGHT: 197.09 LBS

## 2022-03-10 VITALS
WEIGHT: 197.09 LBS | RESPIRATION RATE: 18 BRPM | HEART RATE: 73 BPM | HEIGHT: 74 IN | DIASTOLIC BLOOD PRESSURE: 74 MMHG | SYSTOLIC BLOOD PRESSURE: 134 MMHG | TEMPERATURE: 98 F | OXYGEN SATURATION: 98 %

## 2022-03-10 DIAGNOSIS — Z98.890 OTHER SPECIFIED POSTPROCEDURAL STATES: Chronic | ICD-10-CM

## 2022-03-10 DIAGNOSIS — M54.16 RADICULOPATHY, LUMBAR REGION: ICD-10-CM

## 2022-03-10 LAB
ANION GAP SERPL CALC-SCNC: 10 MMOL/L — SIGNIFICANT CHANGE UP (ref 5–17)
BASOPHILS # BLD AUTO: 0 K/UL — SIGNIFICANT CHANGE UP (ref 0–0.2)
BASOPHILS NFR BLD AUTO: 0 % — SIGNIFICANT CHANGE UP (ref 0–2)
BUN SERPL-MCNC: 21 MG/DL — SIGNIFICANT CHANGE UP (ref 7–23)
CALCIUM SERPL-MCNC: 8.8 MG/DL — SIGNIFICANT CHANGE UP (ref 8.4–10.5)
CHLORIDE SERPL-SCNC: 105 MMOL/L — SIGNIFICANT CHANGE UP (ref 96–108)
CO2 SERPL-SCNC: 25 MMOL/L — SIGNIFICANT CHANGE UP (ref 22–31)
CREAT SERPL-MCNC: 0.87 MG/DL — SIGNIFICANT CHANGE UP (ref 0.5–1.3)
EGFR: 96 ML/MIN/1.73M2 — SIGNIFICANT CHANGE UP
EOSINOPHIL # BLD AUTO: 0.08 K/UL — SIGNIFICANT CHANGE UP (ref 0–0.5)
EOSINOPHIL NFR BLD AUTO: 0.9 % — SIGNIFICANT CHANGE UP (ref 0–6)
GLUCOSE SERPL-MCNC: 136 MG/DL — HIGH (ref 70–99)
HCT VFR BLD CALC: 34.5 % — LOW (ref 39–50)
HGB BLD-MCNC: 11.4 G/DL — LOW (ref 13–17)
LYMPHOCYTES # BLD AUTO: 0.39 K/UL — LOW (ref 1–3.3)
LYMPHOCYTES # BLD AUTO: 4.4 % — LOW (ref 13–44)
MANUAL SMEAR VERIFICATION: SIGNIFICANT CHANGE UP
MCHC RBC-ENTMCNC: 28 PG — SIGNIFICANT CHANGE UP (ref 27–34)
MCHC RBC-ENTMCNC: 33 GM/DL — SIGNIFICANT CHANGE UP (ref 32–36)
MCV RBC AUTO: 84.8 FL — SIGNIFICANT CHANGE UP (ref 80–100)
MONOCYTES # BLD AUTO: 0.08 K/UL — SIGNIFICANT CHANGE UP (ref 0–0.9)
MONOCYTES NFR BLD AUTO: 0.9 % — LOW (ref 2–14)
NEUTROPHILS # BLD AUTO: 8.27 K/UL — HIGH (ref 1.8–7.4)
NEUTROPHILS NFR BLD AUTO: 92.1 % — HIGH (ref 43–77)
NEUTS BAND # BLD: 1.7 % — SIGNIFICANT CHANGE UP (ref 0–8)
PLAT MORPH BLD: NORMAL — SIGNIFICANT CHANGE UP
PLATELET # BLD AUTO: 164 K/UL — SIGNIFICANT CHANGE UP (ref 150–400)
POTASSIUM SERPL-MCNC: 4.2 MMOL/L — SIGNIFICANT CHANGE UP (ref 3.5–5.3)
POTASSIUM SERPL-SCNC: 4.2 MMOL/L — SIGNIFICANT CHANGE UP (ref 3.5–5.3)
RBC # BLD: 4.07 M/UL — LOW (ref 4.2–5.8)
RBC # FLD: 13.5 % — SIGNIFICANT CHANGE UP (ref 10.3–14.5)
RBC BLD AUTO: SIGNIFICANT CHANGE UP
SODIUM SERPL-SCNC: 140 MMOL/L — SIGNIFICANT CHANGE UP (ref 135–145)
WBC # BLD: 8.82 K/UL — SIGNIFICANT CHANGE UP (ref 3.8–10.5)
WBC # FLD AUTO: 8.82 K/UL — SIGNIFICANT CHANGE UP (ref 3.8–10.5)

## 2022-03-10 PROCEDURE — 63047 LAM FACETEC & FORAMOT LUMBAR: CPT

## 2022-03-10 PROCEDURE — 63048 LAM FACETEC &FORAMOT EA ADDL: CPT

## 2022-03-10 DEVICE — SURGIFOAM PAD 8CM X 12.5CM X 10MM (100): Type: IMPLANTABLE DEVICE | Status: FUNCTIONAL

## 2022-03-10 DEVICE — SURGIFLO MATRIX WITH THROMBIN KIT: Type: IMPLANTABLE DEVICE | Status: FUNCTIONAL

## 2022-03-10 RX ORDER — SODIUM CHLORIDE 9 MG/ML
3 INJECTION INTRAMUSCULAR; INTRAVENOUS; SUBCUTANEOUS EVERY 8 HOURS
Refills: 0 | Status: DISCONTINUED | OUTPATIENT
Start: 2022-03-10 | End: 2022-03-10

## 2022-03-10 RX ORDER — DEXAMETHASONE 0.5 MG/5ML
1 ELIXIR ORAL EVERY 6 HOURS
Refills: 0 | Status: DISCONTINUED | OUTPATIENT
Start: 2022-03-12 | End: 2022-03-12

## 2022-03-10 RX ORDER — TRAMADOL HYDROCHLORIDE 50 MG/1
50 TABLET ORAL EVERY 6 HOURS
Refills: 0 | Status: DISCONTINUED | OUTPATIENT
Start: 2022-03-10 | End: 2022-03-12

## 2022-03-10 RX ORDER — DEXAMETHASONE 0.5 MG/5ML
5 ELIXIR ORAL EVERY 6 HOURS
Refills: 0 | Status: COMPLETED | OUTPATIENT
Start: 2022-03-10 | End: 2022-03-11

## 2022-03-10 RX ORDER — HYDROMORPHONE HYDROCHLORIDE 2 MG/ML
2 INJECTION INTRAMUSCULAR; INTRAVENOUS; SUBCUTANEOUS EVERY 4 HOURS
Refills: 0 | Status: DISCONTINUED | OUTPATIENT
Start: 2022-03-10 | End: 2022-03-10

## 2022-03-10 RX ORDER — OXYCODONE HYDROCHLORIDE 5 MG/1
5 TABLET ORAL EVERY 4 HOURS
Refills: 0 | Status: DISCONTINUED | OUTPATIENT
Start: 2022-03-10 | End: 2022-03-12

## 2022-03-10 RX ORDER — ACETAMINOPHEN 500 MG
500 TABLET ORAL EVERY 6 HOURS
Refills: 0 | Status: DISCONTINUED | OUTPATIENT
Start: 2022-03-10 | End: 2022-03-12

## 2022-03-10 RX ORDER — DEXAMETHASONE 0.5 MG/5ML
3 ELIXIR ORAL EVERY 6 HOURS
Refills: 0 | Status: COMPLETED | OUTPATIENT
Start: 2022-03-11 | End: 2022-03-12

## 2022-03-10 RX ORDER — TIZANIDINE 4 MG/1
1 TABLET ORAL
Qty: 0 | Refills: 0 | DISCHARGE

## 2022-03-10 RX ORDER — DICLOFENAC SODIUM 75 MG/1
1 TABLET, DELAYED RELEASE ORAL
Qty: 0 | Refills: 0 | DISCHARGE

## 2022-03-10 RX ORDER — SENNA PLUS 8.6 MG/1
2 TABLET ORAL AT BEDTIME
Refills: 0 | Status: DISCONTINUED | OUTPATIENT
Start: 2022-03-10 | End: 2022-03-12

## 2022-03-10 RX ORDER — TAMSULOSIN HYDROCHLORIDE 0.4 MG/1
0.4 CAPSULE ORAL AT BEDTIME
Refills: 0 | Status: DISCONTINUED | OUTPATIENT
Start: 2022-03-10 | End: 2022-03-12

## 2022-03-10 RX ORDER — SODIUM CHLORIDE 9 MG/ML
1000 INJECTION, SOLUTION INTRAVENOUS
Refills: 0 | Status: DISCONTINUED | OUTPATIENT
Start: 2022-03-10 | End: 2022-03-10

## 2022-03-10 RX ORDER — MAGNESIUM HYDROXIDE 400 MG/1
30 TABLET, CHEWABLE ORAL EVERY 12 HOURS
Refills: 0 | Status: DISCONTINUED | OUTPATIENT
Start: 2022-03-10 | End: 2022-03-12

## 2022-03-10 RX ORDER — CEFAZOLIN SODIUM 1 G
2000 VIAL (EA) INJECTION EVERY 8 HOURS
Refills: 0 | Status: COMPLETED | OUTPATIENT
Start: 2022-03-10 | End: 2022-03-11

## 2022-03-10 RX ORDER — CHLORHEXIDINE GLUCONATE 213 G/1000ML
1 SOLUTION TOPICAL ONCE
Refills: 0 | Status: DISCONTINUED | OUTPATIENT
Start: 2022-03-10 | End: 2022-03-10

## 2022-03-10 RX ORDER — PANTOPRAZOLE SODIUM 20 MG/1
40 TABLET, DELAYED RELEASE ORAL
Refills: 0 | Status: DISCONTINUED | OUTPATIENT
Start: 2022-03-10 | End: 2022-03-12

## 2022-03-10 RX ORDER — KETOROLAC TROMETHAMINE 30 MG/ML
10 SYRINGE (ML) INJECTION EVERY 6 HOURS
Refills: 0 | Status: DISCONTINUED | OUTPATIENT
Start: 2022-03-10 | End: 2022-03-12

## 2022-03-10 RX ORDER — HYDROMORPHONE HYDROCHLORIDE 2 MG/ML
0.5 INJECTION INTRAMUSCULAR; INTRAVENOUS; SUBCUTANEOUS
Refills: 0 | Status: DISCONTINUED | OUTPATIENT
Start: 2022-03-10 | End: 2022-03-10

## 2022-03-10 RX ORDER — HYDROMORPHONE HYDROCHLORIDE 2 MG/ML
1 INJECTION INTRAMUSCULAR; INTRAVENOUS; SUBCUTANEOUS
Refills: 0 | Status: DISCONTINUED | OUTPATIENT
Start: 2022-03-10 | End: 2022-03-10

## 2022-03-10 RX ORDER — LIDOCAINE HCL 20 MG/ML
0.2 VIAL (ML) INJECTION ONCE
Refills: 0 | Status: DISCONTINUED | OUTPATIENT
Start: 2022-03-10 | End: 2022-03-10

## 2022-03-10 RX ADMIN — Medication 100 MILLIGRAM(S): at 20:36

## 2022-03-10 RX ADMIN — Medication 10 MILLIGRAM(S): at 23:20

## 2022-03-10 RX ADMIN — Medication 5 MILLIGRAM(S): at 18:48

## 2022-03-10 RX ADMIN — SENNA PLUS 2 TABLET(S): 8.6 TABLET ORAL at 22:16

## 2022-03-10 RX ADMIN — Medication 5 MILLIGRAM(S): at 23:20

## 2022-03-10 RX ADMIN — Medication 10 MILLIGRAM(S): at 22:50

## 2022-03-10 RX ADMIN — Medication 500 MILLIGRAM(S): at 22:45

## 2022-03-10 RX ADMIN — TAMSULOSIN HYDROCHLORIDE 0.4 MILLIGRAM(S): 0.4 CAPSULE ORAL at 22:16

## 2022-03-10 RX ADMIN — Medication 500 MILLIGRAM(S): at 22:15

## 2022-03-10 NOTE — PHYSICAL THERAPY INITIAL EVALUATION ADULT - ADDITIONAL COMMENTS
Pt lives w/ his spouse in a pvt home has no steps at entry, pt was independent w/ all ADLs and functional mobility at baseline.

## 2022-03-10 NOTE — CHART NOTE - NSCHARTNOTEFT_GEN_A_CORE
CAPRINI SCORE [CLOT]    AGE RELATED RISK FACTORS                                                       MOBILITY RELATED FACTORS  [ ] Age 41-60 years                                            (1 Point)                  [ ] Bed rest                                                        (1 Point)  [x] Age: 61-74 years                                           (2 Points)                 [ ] Plaster cast                                                   (2 Points)  [ ] Age= 75 years                                              (3 Points)                 [ ] Bed bound for more than 72 hours                 (2 Points)    DISEASE RELATED RISK FACTORS                                               GENDER SPECIFIC FACTORS  [ ] Edema in the lower extremities                       (1 Point)                  [ ] Pregnancy                                                     (1 Point)  [ ] Varicose veins                                               (1 Point)                  [ ] Post-partum < 6 weeks                                   (1 Point)             [x] BMI > 25 Kg/m2                                            (1 Point)                  [ ] Hormonal therapy  or oral contraception          (1 Point)                 [ ] Sepsis (in the previous month)                        (1 Point)                  [ ] History of pregnancy complications                 (1 point)  [ ] Pneumonia or serious lung disease                                               [ ] Unexplained or recurrent                     (1 Point)           (in the previous month)                               (1 Point)  [ ] Abnormal pulmonary function test                     (1 Point)                 SURGERY RELATED RISK FACTORS  [ ] Acute myocardial infarction                              (1 Point)                 [ ]  Section                                             (1 Point)  [ ] Congestive heart failure (in the previous month)  (1 Point)               [ ] Minor surgery                                                  (1 Point)   [ ] Inflammatory bowel disease                             (1 Point)                 [ ] Arthroscopic surgery                                        (2 Points)  [ ] Central venous access                                      (2 Points)               [x] General surgery lasting more than 45 minutes   (2 Points)       [ ] Stroke (in the previous month)                          (5 Points)               [ ] Elective arthroplasty                                         (5 Points)                                                                                                                                               HEMATOLOGY RELATED FACTORS                                                 TRAUMA RELATED RISK FACTORS  [ ] Prior episodes of VTE                                     (3 Points)                [ ] Fracture of the hip, pelvis, or leg                       (5 Points)  [ ] Positive family history for VTE                         (3 Points)                 [ ] Acute spinal cord injury (in the previous month)  (5 Points)  [ ] Prothrombin 01659 A                                     (3 Points)                 [ ] Paralysis  (less than 1 month)                             (5 Points)  [ ] Factor V Leiden                                             (3 Points)                  [ ] Multiple Trauma within 1 month                        (5 Points)  [ ] Lupus anticoagulants                                     (3 Points)                                                           [ ] Anticardiolipin antibodies                               (3 Points)                                                       [ ] High homocysteine in the blood                      (3 Points)                                             [ ] Other congenital or acquired thrombophilia      (3 Points)                                                [ ] Heparin induced thrombocytopenia                  (3 Points)                                          Total Score [    5      ] Does not meet criteria for inpatient imaging, will continue to reassess.    Caprini Score 0 - 2:  Low Risk, No VTE Prophylaxis required for most patients, encourage ambulation  Caprini Score 3 - 6:  At Risk, pharmacologic VTE prophylaxis is indicated for most patients (in the absence of a contraindication)  Caprini Score Greater than or = 7:  High Risk, pharmacologic VTE prophylaxis is indicated for most patients (in the absence of a contraindication)    Esperanza Mckinley PA-C  Team Pager #[phone]
Patient seen in RR with wife at bedside, resting without complaints.  No Chest Pain, SOB, N/V.  Pre op s/sx: low back pain with radiculopathy to LLE and left groin; Post op, patient reports: improved sensation, slight low back/incisional pain.    T(C): 36.5 (03-10-22 @ 16:15), Max: 36.8 (03-10-22 @ 10:15)  HR: 65 (03-10-22 @ 17:45) (63 - 73)  BP: 133/69 (03-10-22 @ 17:45) (113/61 - 134/74)  RR: 18 (03-10-22 @ 17:45) (18 - 20)  SpO2: 99% (03-10-22 @ 17:45) (98% - 100%)  Wt(kg): --    Exam:   Alert/Oriented, No Acute Distress  Cards: +S1/S2, RRR  Pulm: CTAB           Dressing: [x] clean/dry/intact  [ ] Other:           Drains: : HV x 1; maintaining good suction         Sensation: [ ] intact to light touch  [x] decreased: Slightly decreased to left foot         Motor exam: [  ]          [ ] Upper extremity                Bi          Tri        Delt                                                    R         5/5        5/5        5/5                                               L          5/5        5/5        5/5                  [ ] Lower extremity           PF          DF         EHL       FHL                                                                                            R        5/5        5/5        5/5       5/5                                                        L         5/5        5/5        5/5       5/5                                                                  Calves Soft/Non-tender bilaterally           Toes warm well perfused; capillary refill <3 seconds          Nunez in place; draining well             LABS:                        11.4   8.82  )-----------( 164      ( 10 Mar 2022 17:07 )             34.5     03-10    140  |  105  |  21  ----------------------------<  136<H>  4.2   |  25  |  0.87    Ca    8.8      10 Mar 2022 17:07          A/P : Patient is a  66y Male s/p L1-L4 lami.  -    Pain control  -    Taper  -    DVT ppx: SCDs       -    Physical Therapy  -    Weight bearing status: WBAT [x]        PWB    [ ]     TTWB  [ ]      NWB  [ ]  -    FU HV outputs  -    Plan to d/c raul in AM      Avis Landers PA-C  Team Pager #[phone]

## 2022-03-10 NOTE — PHYSICAL THERAPY INITIAL EVALUATION ADULT - PERTINENT HX OF CURRENT PROBLEM, REHAB EVAL
65 y/o male PMH BPH, GERD with esophageal narrowing, S/P esophageal dilation with resolution of dysphagia, lumbar disc disease, S/P lumbar laminectomy, 11/2021 started having progressively worsening pain in the hips and anterior thighs, has left foot drop.  Presents for L1-L4 decompression.

## 2022-03-10 NOTE — PATIENT PROFILE ADULT - FALL HARM RISK - UNIVERSAL INTERVENTIONS
Bed in lowest position, wheels locked, appropriate side rails in place/Call bell, personal items and telephone in reach/Instruct patient to call for assistance before getting out of bed or chair/Non-slip footwear when patient is out of bed/[place] to call system/Physically safe environment - no spills, clutter or unnecessary equipment/Purposeful Proactive Rounding/Room/bathroom lighting operational, light cord in reach

## 2022-03-11 ENCOUNTER — TRANSCRIPTION ENCOUNTER (OUTPATIENT)
Age: 67
End: 2022-03-11

## 2022-03-11 LAB
ANION GAP SERPL CALC-SCNC: 11 MMOL/L — SIGNIFICANT CHANGE UP (ref 5–17)
BASOPHILS # BLD AUTO: 0.01 K/UL — SIGNIFICANT CHANGE UP (ref 0–0.2)
BASOPHILS NFR BLD AUTO: 0.1 % — SIGNIFICANT CHANGE UP (ref 0–2)
BUN SERPL-MCNC: 20 MG/DL — SIGNIFICANT CHANGE UP (ref 7–23)
CALCIUM SERPL-MCNC: 9 MG/DL — SIGNIFICANT CHANGE UP (ref 8.4–10.5)
CHLORIDE SERPL-SCNC: 104 MMOL/L — SIGNIFICANT CHANGE UP (ref 96–108)
CO2 SERPL-SCNC: 25 MMOL/L — SIGNIFICANT CHANGE UP (ref 22–31)
CREAT SERPL-MCNC: 0.76 MG/DL — SIGNIFICANT CHANGE UP (ref 0.5–1.3)
EGFR: 100 ML/MIN/1.73M2 — SIGNIFICANT CHANGE UP
EOSINOPHIL # BLD AUTO: 0 K/UL — SIGNIFICANT CHANGE UP (ref 0–0.5)
EOSINOPHIL NFR BLD AUTO: 0 % — SIGNIFICANT CHANGE UP (ref 0–6)
GLUCOSE SERPL-MCNC: 148 MG/DL — HIGH (ref 70–99)
HCT VFR BLD CALC: 33.8 % — LOW (ref 39–50)
HGB BLD-MCNC: 11.2 G/DL — LOW (ref 13–17)
IMM GRANULOCYTES NFR BLD AUTO: 1.1 % — SIGNIFICANT CHANGE UP (ref 0–1.5)
LYMPHOCYTES # BLD AUTO: 0.69 K/UL — LOW (ref 1–3.3)
LYMPHOCYTES # BLD AUTO: 5.1 % — LOW (ref 13–44)
MCHC RBC-ENTMCNC: 28.2 PG — SIGNIFICANT CHANGE UP (ref 27–34)
MCHC RBC-ENTMCNC: 33.1 GM/DL — SIGNIFICANT CHANGE UP (ref 32–36)
MCV RBC AUTO: 85.1 FL — SIGNIFICANT CHANGE UP (ref 80–100)
MONOCYTES # BLD AUTO: 0.55 K/UL — SIGNIFICANT CHANGE UP (ref 0–0.9)
MONOCYTES NFR BLD AUTO: 4.1 % — SIGNIFICANT CHANGE UP (ref 2–14)
NEUTROPHILS # BLD AUTO: 12.17 K/UL — HIGH (ref 1.8–7.4)
NEUTROPHILS NFR BLD AUTO: 89.6 % — HIGH (ref 43–77)
NRBC # BLD: 0 /100 WBCS — SIGNIFICANT CHANGE UP (ref 0–0)
PLATELET # BLD AUTO: 182 K/UL — SIGNIFICANT CHANGE UP (ref 150–400)
POTASSIUM SERPL-MCNC: 4.1 MMOL/L — SIGNIFICANT CHANGE UP (ref 3.5–5.3)
POTASSIUM SERPL-SCNC: 4.1 MMOL/L — SIGNIFICANT CHANGE UP (ref 3.5–5.3)
RBC # BLD: 3.97 M/UL — LOW (ref 4.2–5.8)
RBC # FLD: 13.2 % — SIGNIFICANT CHANGE UP (ref 10.3–14.5)
SODIUM SERPL-SCNC: 140 MMOL/L — SIGNIFICANT CHANGE UP (ref 135–145)
WBC # BLD: 13.57 K/UL — HIGH (ref 3.8–10.5)
WBC # FLD AUTO: 13.57 K/UL — HIGH (ref 3.8–10.5)

## 2022-03-11 RX ADMIN — Medication 500 MILLIGRAM(S): at 22:20

## 2022-03-11 RX ADMIN — Medication 5 MILLIGRAM(S): at 05:34

## 2022-03-11 RX ADMIN — Medication 10 MILLIGRAM(S): at 22:20

## 2022-03-11 RX ADMIN — TAMSULOSIN HYDROCHLORIDE 0.4 MILLIGRAM(S): 0.4 CAPSULE ORAL at 22:19

## 2022-03-11 RX ADMIN — Medication 500 MILLIGRAM(S): at 11:23

## 2022-03-11 RX ADMIN — Medication 500 MILLIGRAM(S): at 18:30

## 2022-03-11 RX ADMIN — Medication 10 MILLIGRAM(S): at 22:50

## 2022-03-11 RX ADMIN — Medication 10 MILLIGRAM(S): at 11:22

## 2022-03-11 RX ADMIN — SENNA PLUS 2 TABLET(S): 8.6 TABLET ORAL at 22:19

## 2022-03-11 RX ADMIN — Medication 500 MILLIGRAM(S): at 05:34

## 2022-03-11 RX ADMIN — PANTOPRAZOLE SODIUM 40 MILLIGRAM(S): 20 TABLET, DELAYED RELEASE ORAL at 05:34

## 2022-03-11 RX ADMIN — Medication 10 MILLIGRAM(S): at 12:30

## 2022-03-11 RX ADMIN — Medication 500 MILLIGRAM(S): at 06:04

## 2022-03-11 RX ADMIN — Medication 500 MILLIGRAM(S): at 22:50

## 2022-03-11 RX ADMIN — Medication 500 MILLIGRAM(S): at 12:30

## 2022-03-11 RX ADMIN — Medication 100 MILLIGRAM(S): at 05:33

## 2022-03-11 RX ADMIN — Medication 500 MILLIGRAM(S): at 17:32

## 2022-03-11 RX ADMIN — Medication 5 MILLIGRAM(S): at 11:23

## 2022-03-11 RX ADMIN — Medication 10 MILLIGRAM(S): at 06:07

## 2022-03-11 RX ADMIN — Medication 10 MILLIGRAM(S): at 05:37

## 2022-03-11 RX ADMIN — Medication 3 MILLIGRAM(S): at 17:31

## 2022-03-11 RX ADMIN — Medication 10 MILLIGRAM(S): at 17:31

## 2022-03-11 RX ADMIN — Medication 10 MILLIGRAM(S): at 18:30

## 2022-03-11 NOTE — DISCHARGE NOTE PROVIDER - CARE PROVIDER_API CALL
Ramírez Murray (MD)  [facility]  [address], [address]  [place] [address]  Phone: [phone]  Fax: [phone]  Follow Up Time:

## 2022-03-11 NOTE — DISCHARGE NOTE PROVIDER - HOSPITAL COURSE
Goal: Pain/function improvement    PAST MEDICAL HISTORY:  Benign prostatic hyperplasia   GERD (gastroesophageal reflux disease)   Melanoma   Moderate mitral regurgitation   MVP (mitral valve prolapse).     PAST SURGICAL HISTORY:  S/P hernia repair   S/P lumbar laminectomy   Status post dilation of esophageal narrowing.    This is a 66 year old male admitted to [facility] on 3/10/22 for an elective spine surgery.  Surgery was uncomplicated.  Patient evaluated and treated by PT, recommended for Home.  Remain of hospital stay unremarkable, and patient discharged home when PT cleared.  No new neuro deficits. Goal: Pain/function improvement    PAST MEDICAL HISTORY:  Benign prostatic hyperplasia   GERD (gastroesophageal reflux disease)   Melanoma   Moderate mitral regurgitation   MVP (mitral valve prolapse).     PAST SURGICAL HISTORY:  S/P hernia repair   S/P lumbar laminectomy   Status post dilation of esophageal narrowing.    This is a 66 year old male admitted to [facility] on 3/10/22 for an elective spine surgery.  Surgery was uncomplicated.  Patient evaluated and treated by PT, recommended for Home.  Remain of hospital stay unremarkable, and patient discharged home when PT cleared.  No new neuro deficits.  3/10:                          9.7    12.95 )-----------( 177      ( 12 Mar 2022 07:07 )             29.7    Goal: Pain/function improvement    PAST MEDICAL HISTORY:  Benign prostatic hyperplasia   GERD (gastroesophageal reflux disease)   Melanoma   Moderate mitral regurgitation   MVP (mitral valve prolapse).     PAST SURGICAL HISTORY:  S/P hernia repair   S/P lumbar laminectomy   Status post dilation of esophageal narrowing.    Hospital Course:   This is a 66 year old male admitted to [facility] on 3/10/22 for an elective spine surgery.  Surgery was uncomplicated.  Patient evaluated and treated by PT, recommended for Home.  Remain of hospital stay unremarkable, and patient discharged home when PT cleared.  No new neuro deficits.  3/12: Pt cleared and stable for d/c  Follow up Dr Murray in office  No new neurological deficits                          9.7    12.95 )-----------( 177      ( 12 Mar 2022 07:07 )             29.7     I-stop  Reference #: [id number]

## 2022-03-11 NOTE — DISCHARGE NOTE PROVIDER - NSDCFUSCHEDAPPT_GEN_ALL_CORE_FT
Message   Recorded as Task   Date: 12/08/2017 12:15 PM, Created By: Helga Stevens   Task Name: Follow Up   Assigned To: Helga Stevens   Regarding Patient: IKE BABB, Status: Active   Comment:    Helga Stevens - 08 Dec 2017 12:15 PM     TASK CREATED  Pt called stating she delivered on 12/6 and is now home. She states that this am she passed a blood clot the size of a gold ball. One time incident. She denies heavy vb, denies saturating pads, denies light headedness, dizziness or visual disturbances. Instructed pt to continue to monitor bleeding and if it increases to the point where she is saturating a pad Q1hr for 3hrs she should go to the ED for eval or continues to pass lrg blood clots. Pt verbalized understanding. No further concerns stated. Instructed to call back w/any questions or concerns.        Signatures   Electronically signed by : Helga Stevens R.N.; Dec  8 2017 12:16PM CST    
GABE BLOOD ; 03/23/2022 ; [facility]

## 2022-03-11 NOTE — OCCUPATIONAL THERAPY INITIAL EVALUATION ADULT - PERTINENT HX OF CURRENT PROBLEM, REHAB EVAL
67 y/o male PMH BPH, GERD with esophageal narrowing, S/P esophageal dilation with resolution of dysphagia, lumbar disc disease, S/P lumbar laminectomy, 11/2021 started having progressively worsening pain in the hips and anterior thighs, has left foot drop.  Presents today for L1-L4 decompression. See below

## 2022-03-11 NOTE — CONSULT NOTE ADULT - ASSESSMENT
Patient is a 66y old  Male who presents with a chief complaint of L1-L4 lumbar laminectomy (11 Mar 2022 06:50)      HPI:  This is a 67 y/o male PMH BPH, GERD with esophageal narrowing, S/P esophageal dilation with resolution of dysphagia, lumbar disc disease, S/P lumbar laminectomy, 11/2021 started having progressively worsening pain in the hips and anterior thighs, has left foot drop. s/p L1-L4 decompression on 3/10/22. Consult called for post op medical mgmt.    s/p L1-L4 decompression on 3/10/22 (POD 1)  Incision care per Ortho  Monitor outpt via [facility]  Pain mgmt  Bowel regimen  Incentive spirometry  PT eval  Nunez to be removed today --> follow up TOV  Advance diet as tolerated  Care per Ortho    BPH:  Continue Flomax    GERD:  Protonix     DVT ppx:  IPCs    [facility]  [phone]

## 2022-03-11 NOTE — DISCHARGE NOTE PROVIDER - NSDCMRMEDTOKEN_GEN_ALL_CORE_FT
omeprazole 20 mg oral delayed release capsule: 1 cap(s) orally once a day  tamsulosin 0.4 mg oral capsule: 1 cap(s) orally once a day   acetaminophen 500 mg oral tablet: 1 tab(s) orally every 6-8  hours x ONE (1) WEEK, then as needed   ketorolac 10 mg oral tablet: 1 tab(s) orally every 6 hours  omeprazole 20 mg oral delayed release capsule: 1 cap(s) orally once a day  senna oral tablet: 2 tab(s) orally once a day (at bedtime)  while on pain medications   tamsulosin 0.4 mg oral capsule: 1 cap(s) orally once a day  tiZANidine 4 mg oral tablet: 1 tab(s) orally every 6 hours x ONE (1) WEEK, then as needed   traMADol 50 mg oral tablet: 1 tab(s) orally every 6 hours, As needed, Moderate Pain (4 - 6)   acetaminophen 500 mg oral tablet: 1 tab(s) orally every 6-8  hours x ONE (1) WEEK, then as needed   Colace 100 mg oral capsule: 1 cap(s) orally 2 times a day   ketorolac 10 mg oral tablet: 1 tab(s) orally every 6 hours x one (1) Week  Multiple Vitamins oral tablet: 1 tab(s) orally once a day  omeprazole 20 mg oral delayed release capsule: 1 cap(s) orally once a day  senna oral tablet: 2 tab(s) orally once a day (at bedtime)  while on pain medications   tamsulosin 0.4 mg oral capsule: 1 cap(s) orally once a day  tiZANidine 4 mg oral tablet: 1 tab(s) orally every 6 hours x ONE (1) WEEK, OR: as needed [muscle spasm]  traMADol 50 mg oral tablet: 1 tab(s) orally every 6 hours, As needed, Moderate- severe Pain MDD:4   acetaminophen 500 mg oral tablet: 1 tab(s) orally every 6-8  hours x ONE (1) WEEK, then as needed   Colace 100 mg oral capsule: 1 cap(s) orally 2 times a day   ketorolac 10 mg oral tablet: 1 tab(s) orally every 6 hours x one (1) Week  Medrol Dosepak 4 mg oral tablet: take as directed  Multiple Vitamins oral tablet: 1 tab(s) orally once a day  omeprazole 20 mg oral delayed release capsule: 1 cap(s) orally once a day  senna oral tablet: 2 tab(s) orally once a day (at bedtime)  while on pain medications   tamsulosin 0.4 mg oral capsule: 1 cap(s) orally once a day  tiZANidine 4 mg oral tablet: 1 tab(s) orally every 6 hours x ONE (1) WEEK, OR: as needed [muscle spasm]  traMADol 50 mg oral tablet: 1 tab(s) orally every 6 hours, As needed, Moderate- severe Pain MDD:4

## 2022-03-11 NOTE — CONSULT NOTE ADULT - SUBJECTIVE AND OBJECTIVE BOX
Patient is a 66y old  Male who presents with a chief complaint of L1-L4 lumbar laminectomy (11 Mar 2022 06:50)      HPI:  This is a 67 y/o male PMH BPH, GERD with esophageal narrowing, S/P esophageal dilation with resolution of dysphagia, lumbar disc disease, S/P lumbar laminectomy, 11/2021 started having progressively worsening pain in the hips and anterior thighs, has left foot drop.  Presents today for L1-L4 decompression. (02 Mar 2022 17:59)      PAST MEDICAL & SURGICAL HISTORY:  Benign prostatic hyperplasia    GERD (gastroesophageal reflux disease)    Melanoma    MVP (mitral valve prolapse)    Moderate mitral regurgitation    S/P hernia repair    S/P lumbar laminectomy    Status post dilation of esophageal narrowing        FAMILY HISTORY:      SOCIAL HISTORY:    Allergies    penicillin (Hives)    Intolerances          REVIEW OF SYSTEMS:  General: no weakness, no fever/chills, no weight loss/gain  Skin/Breast: no rash, no jaundice  Ophthalmologic: no vision changes, no dry eyes   Respiratory and Thorax: no cough, no wheezing, no hemoptysis, no dyspnea  Cardiovascular: no chest pain, no shortness of breath, no orthopnea  Gastrointestinal: no n/v/d, no abdominal pain, no dysphagia   Genitourinary: no dysuria, no frequency, no nocturia, no hematuria  Musculoskeletal: no trauma, no sprain/strain, no myalgias, no arthralgias, no fracture  Neurological: no HA, no dizziness, no weakness, no numbness  Psychiatric: no depression, no SI/HI  Hematology/Lymphatics: no easy bruising  Endocrine: no heat or cold intolerance. no weight gain or loss  Allergic/Immunologic: no allergy or recent reaction     SUBJECTIVE / OVERNIGHT EVENTS:    s/p Lumbar laminectomy L1-L4  no events overnight  patient seen and examined  OOB in chair  overall feels well    Vital Signs Last 24 Hrs  T(C): 37.1 (11 Mar 2022 09:14), Max: 37.6 (10 Mar 2022 22:57)  T(F): 98.7 (11 Mar 2022 09:14), Max: 99.6 (10 Mar 2022 22:57)  HR: 71 (11 Mar 2022 09:36) (63 - 76)  BP: 123/68 (11 Mar 2022 09:36) (108/62 - 140/60)  BP(mean): 92 (10 Mar 2022 18:45) (80 - 95)  RR: 18 (11 Mar 2022 09:14) (17 - 20)  SpO2: 97% (11 Mar 2022 09:14) (94% - 100%)  I&O's Summary    10 Mar 2022 07:01  -  11 Mar 2022 07:00  --------------------------------------------------------  IN: 625 mL / OUT: 1850 mL / NET: -1225 mL        PHYSICAL EXAM:  GENERAL: NAD, Comfortable  HEAD:  Atraumatic, Normocephalic  EYES: EOMI, PERRLA, conjunctiva and sclera clear  NECK: Supple, No JVD  CHEST/LUNG: Clear to auscultation bilaterally; No wheeze  HEART: Regular rate and rhythm; No murmurs, rubs, or gallops  ABDOMEN: Soft, Nontender, Nondistended; Bowel sounds present  NEURO: AAOx3, no focal deficit, 5/5 b/l extremities  EXTREMITIES:  2+ Peripheral Pulses, No clubbing, cyanosis, or edema  SKIN: No rashes or lesions  BACK: dsg c/d/i, [facility] x1  : Nunez catheter     LABS:                        11.4   8.82  )-----------( 164      ( 10 Mar 2022 17:07 )             34.5     03-11    140  |  104  |  20  ----------------------------<  148<H>  4.1   |  25  |  0.76    Ca    9.0      11 Mar 2022 06:56        CAPILLARY BLOOD GLUCOSE                RADIOLOGY & ADDITIONAL TESTS:    Imaging Personally Reviewed:  [x] YES  [ ] NO    Consultant(s) Notes Reviewed:  [x] YES  [ ] NO      MEDICATIONS  (STANDING):  acetaminophen     Tablet .. 500 milliGRAM(s) Oral every 6 hours  dexAMETHasone     Tablet 5 milliGRAM(s) Oral every 6 hours  dexAMETHasone     Tablet 3 milliGRAM(s) Oral every 6 hours  ketorolac 10 milliGRAM(s) Oral every 6 hours  pantoprazole    Tablet 40 milliGRAM(s) Oral before breakfast  senna 2 Tablet(s) Oral at bedtime  tamsulosin 0.4 milliGRAM(s) Oral at bedtime  vancomycin  IVPB 1500 milliGRAM(s) IV Intermittent once    MEDICATIONS  (PRN):  magnesium hydroxide Suspension 30 milliLiter(s) Oral every 12 hours PRN Constipation  oxyCODONE    IR 5 milliGRAM(s) Oral every 4 hours PRN Severe Pain (7 - 10)  traMADol 50 milliGRAM(s) Oral every 6 hours PRN Moderate Pain (4 - 6)      Care Discussed with Consultants/Other Providers [x] YES  [ ] NO

## 2022-03-11 NOTE — OCCUPATIONAL THERAPY INITIAL EVALUATION ADULT - LIVES WITH, PROFILE
Pt lives with wife in private home with 0 steps to enter, tub in bathroom. Pt I in ADLs and ambulation prior to admission/spouse

## 2022-03-11 NOTE — DISCHARGE NOTE PROVIDER - NSDCCPTREATMENT_GEN_ALL_CORE_FT
Initial /CM Assessment/Plan of Care Note     Baseline Assessment  49 year old admitted 11/30/2019 as Inpatient with a diagnosis of altered mental status and confusion.  Prior to admission patient was living with Spouse/significant other, Family members and residing at House.  Patient does  have a Power of  for Healthcare.  Document is not activated.  Agent is patient's spouseBebeto [phone].  Patient’s Primary Care Provider is Nkechi Williamson MD.     Medical History  Past Medical History:   Diagnosis Date   • Chronic hepatitis B (CMS/HCC)    • Liver disease    • Thyroid condition        Prior to Admission Status       Agency/Support  Type of Services Prior to Hospitalization: None  Support Systems: Spouse/Significant other, [facility] community, Family members  Home Devices/Equipment: None  Mobility Assist Devices: None  Sensory Support Devices: None    Current Status  PT Ambulation Tips:    PT Transfer Tips:    OT Bathing Tips:    OT Dressing Tips:    OT Toileting Tips:    OT Feeding Tips:    SLP Swallow/Feeding Tips:    SLP Comm/Cog Tips:    Current Mental Status:    Stressors:      Insurance  Primary: [employer]  Secondary: N/A    Barriers to Discharge  Identified Barriers to Discharge/Transition Planning: Assessment/stabilization in progress    Progress Note  Case Opened.  Social work consulted for discharge planning.  Medical chart reviewed.  Patient admitted for altered mental status and confusion which is residing.  Patient has a medical history significant for chronic hepatitis B, liver disease,hypertension, rectal tubulovillous adenoma and a thyroid condition.  Patient has a complete/valid HCPOA document in Epic.  Social work familiar with patient from last admit (11/14/19).  Introduced self and explained social work role.  Patient alert and oriented and receptive to social work assessment.  Patient's wife, Bebeto also at bedside.  Patient lives with his wife and 3 female children,  ages 15, 17, and 20 in a home with 12 steps to enter.  Patient independent with mobility and ADL's/self cares.  Patient's wife does all the cooking and cleaning.  Patient is employed as a circuit board .  Patient's wife stated he has been in and out of hospital and has not worked for several weeks.  Patient's bills are piling up and she will need some type of payment plan.  Referral mad to financial counselor, Tereza BETANCOURT ([phone]) to assist for payment plan, T19 (?), Presumptive Disability (?).  Following.    Plan  SW/CM - Recommendations for Discharge: Home  PT - Recommendations for Discharge:    OT - Recommendations for Discharge:    SLP - Recommendations for Discharge:    Anticipate patient will not need post-hospital services. Necessary services are available.  Anticipate patient can return to the environment from which patient entered the hospital.   Anticipate patient can provide self-care at discharge.    Refer to SW/CM Flowsheet for Goals and objective data.      PRINCIPAL PROCEDURE  Procedure: Lumbar decompression  Findings and Treatment:

## 2022-03-11 NOTE — DISCHARGE NOTE PROVIDER - NSDCFUADDINST_GEN_ALL_CORE_FT
Please follow up with your doctor 14 days after your discharge from the hospital (call for appointment).  PT-weight bearing as tolerated.  Keep dressing clean and intact until postop day 5, then may remove and shower.  Have doctor remove staples/sutures post op day 14 (if applicable) and apply steristrips.  Please follow up with your PMD within 1 month for routine checkup.  Please follow up with your doctor 14 days after your discharge from the hospital (call for appointment).    PT-weight bearing as tolerated.    Keep dressing clean and intact until postop day 5, then may remove and shower.    Have doctor remove staples/sutures post op day 14 (if applicable) and apply steristrips.    Please follow up with your PMD within 1 month for routine checkup.   Please call for an appointment     COVID REMINDER: You are being discharged from the hospital. Please keep in mind that the CORONAVIRUS is still in our community.   So, try to restrict activities outside of your home except for getting medical care and simple errands [until seen by your Surgeon]. Call ahead before visiting your doctor.  Wear a facemask when you are outside and around other people. Cover your cough and sneezes.  Clean your hands often.  Try to avoid sharing personal household items.  Clean all frequently touched surfaces daily.  Please follow up with your doctor 14 days after your discharge from the hospital (call for appointment).    PT-weight bearing as tolerated.    Keep dressing clean and intact until postop day 5, May shower; protect dressing with water-tight seal  i.e. saran wrap; pat dry     Have doctor remove staples/sutures post op day 14 (if applicable) and apply steristrips.    Please follow up with your PMD within 1 month for routine checkup.   Please call for an appointment     COVID REMINDER: You are being discharged from the hospital. Please keep in mind that the CORONAVIRUS is still in our community.   So, try to restrict activities outside of your home except for getting medical care and simple errands [until seen by your Surgeon]. Call ahead before visiting your doctor.  Wear a facemask when you are outside and around other people. Cover your cough and sneezes.  Clean your hands often.  Try to avoid sharing personal household items.  Clean all frequently touched surfaces daily.  Please follow up with your doctor 14 days after your discharge from the hospital (call for appointment).    PT-weight bearing as tolerated.  Complete steroid taper as prescribed    Keep dressing clean and intact; On postop day 5, May shower; protect dressing with water-tight seal  i.e. saran wrap; pat dry     Have doctor remove staples/sutures post op day 14 (if applicable) and apply steristrips.    Please follow up with your PMD within 1 month for routine checkup.   Please call for an appointment     COVID REMINDER: You are being discharged from the hospital. Please keep in mind that the CORONAVIRUS is still in our community.   So, try to restrict activities outside of your home except for getting medical care and simple errands [until seen by your Surgeon]. Call ahead before visiting your doctor.  Wear a facemask when you are outside and around other people. Cover your cough and sneezes.  Clean your hands often.  Try to avoid sharing personal household items.  Clean all frequently touched surfaces daily.

## 2022-03-12 ENCOUNTER — TRANSCRIPTION ENCOUNTER (OUTPATIENT)
Age: 67
End: 2022-03-12

## 2022-03-12 VITALS
HEART RATE: 80 BPM | TEMPERATURE: 99 F | RESPIRATION RATE: 18 BRPM | SYSTOLIC BLOOD PRESSURE: 134 MMHG | DIASTOLIC BLOOD PRESSURE: 79 MMHG | OXYGEN SATURATION: 95 %

## 2022-03-12 LAB
ANION GAP SERPL CALC-SCNC: 13 MMOL/L — SIGNIFICANT CHANGE UP (ref 5–17)
BUN SERPL-MCNC: 31 MG/DL — HIGH (ref 7–23)
CALCIUM SERPL-MCNC: 8.9 MG/DL — SIGNIFICANT CHANGE UP (ref 8.4–10.5)
CHLORIDE SERPL-SCNC: 106 MMOL/L — SIGNIFICANT CHANGE UP (ref 96–108)
CO2 SERPL-SCNC: 22 MMOL/L — SIGNIFICANT CHANGE UP (ref 22–31)
CREAT SERPL-MCNC: 0.84 MG/DL — SIGNIFICANT CHANGE UP (ref 0.5–1.3)
EGFR: 97 ML/MIN/1.73M2 — SIGNIFICANT CHANGE UP
GLUCOSE SERPL-MCNC: 128 MG/DL — HIGH (ref 70–99)
HCT VFR BLD CALC: 29.7 % — LOW (ref 39–50)
HGB BLD-MCNC: 9.7 G/DL — LOW (ref 13–17)
MCHC RBC-ENTMCNC: 28 PG — SIGNIFICANT CHANGE UP (ref 27–34)
MCHC RBC-ENTMCNC: 32.7 GM/DL — SIGNIFICANT CHANGE UP (ref 32–36)
MCV RBC AUTO: 85.6 FL — SIGNIFICANT CHANGE UP (ref 80–100)
NRBC # BLD: 0 /100 WBCS — SIGNIFICANT CHANGE UP (ref 0–0)
PLATELET # BLD AUTO: 177 K/UL — SIGNIFICANT CHANGE UP (ref 150–400)
POTASSIUM SERPL-MCNC: 4.2 MMOL/L — SIGNIFICANT CHANGE UP (ref 3.5–5.3)
POTASSIUM SERPL-SCNC: 4.2 MMOL/L — SIGNIFICANT CHANGE UP (ref 3.5–5.3)
RBC # BLD: 3.47 M/UL — LOW (ref 4.2–5.8)
RBC # FLD: 13.8 % — SIGNIFICANT CHANGE UP (ref 10.3–14.5)
SODIUM SERPL-SCNC: 141 MMOL/L — SIGNIFICANT CHANGE UP (ref 135–145)
WBC # BLD: 12.95 K/UL — HIGH (ref 3.8–10.5)
WBC # FLD AUTO: 12.95 K/UL — HIGH (ref 3.8–10.5)

## 2022-03-12 PROCEDURE — C9803: CPT

## 2022-03-12 PROCEDURE — 97110 THERAPEUTIC EXERCISES: CPT

## 2022-03-12 PROCEDURE — 97165 OT EVAL LOW COMPLEX 30 MIN: CPT

## 2022-03-12 PROCEDURE — 97161 PT EVAL LOW COMPLEX 20 MIN: CPT

## 2022-03-12 PROCEDURE — 97116 GAIT TRAINING THERAPY: CPT

## 2022-03-12 PROCEDURE — U0005: CPT

## 2022-03-12 PROCEDURE — 36415 COLL VENOUS BLD VENIPUNCTURE: CPT

## 2022-03-12 PROCEDURE — C1889: CPT

## 2022-03-12 PROCEDURE — 85025 COMPLETE CBC W/AUTO DIFF WBC: CPT

## 2022-03-12 PROCEDURE — 80048 BASIC METABOLIC PNL TOTAL CA: CPT

## 2022-03-12 PROCEDURE — 76000 FLUOROSCOPY <1 HR PHYS/QHP: CPT

## 2022-03-12 PROCEDURE — U0003: CPT

## 2022-03-12 RX ORDER — TIZANIDINE 4 MG/1
4 TABLET ORAL EVERY 6 HOURS
Refills: 0 | Status: DISCONTINUED | OUTPATIENT
Start: 2022-03-12 | End: 2022-03-12

## 2022-03-12 RX ORDER — TRAMADOL HYDROCHLORIDE 50 MG/1
1 TABLET ORAL
Qty: 0 | Refills: 0 | DISCHARGE
Start: 2022-03-12

## 2022-03-12 RX ORDER — TRAMADOL HYDROCHLORIDE 50 MG/1
1 TABLET ORAL
Qty: 28 | Refills: 0
Start: 2022-03-12 | End: 2022-03-18

## 2022-03-12 RX ORDER — KETOROLAC TROMETHAMINE 30 MG/ML
1 SYRINGE (ML) INJECTION
Qty: 28 | Refills: 0
Start: 2022-03-12 | End: 2022-03-18

## 2022-03-12 RX ORDER — DOCUSATE SODIUM 100 MG
1 CAPSULE ORAL
Qty: 14 | Refills: 0
Start: 2022-03-12 | End: 2022-03-18

## 2022-03-12 RX ORDER — TIZANIDINE 4 MG/1
1 TABLET ORAL
Qty: 28 | Refills: 0
Start: 2022-03-12 | End: 2022-03-18

## 2022-03-12 RX ORDER — SENNA PLUS 8.6 MG/1
2 TABLET ORAL
Qty: 14 | Refills: 0
Start: 2022-03-12 | End: 2022-03-18

## 2022-03-12 RX ORDER — TIZANIDINE 4 MG/1
1 TABLET ORAL
Qty: 0 | Refills: 0 | DISCHARGE
Start: 2022-03-12

## 2022-03-12 RX ORDER — KETOROLAC TROMETHAMINE 30 MG/ML
1 SYRINGE (ML) INJECTION
Qty: 0 | Refills: 0 | DISCHARGE
Start: 2022-03-12

## 2022-03-12 RX ORDER — SENNA PLUS 8.6 MG/1
2 TABLET ORAL
Qty: 0 | Refills: 0 | DISCHARGE
Start: 2022-03-12

## 2022-03-12 RX ORDER — ACETAMINOPHEN 500 MG
1 TABLET ORAL
Qty: 0 | Refills: 0 | DISCHARGE
Start: 2022-03-12

## 2022-03-12 RX ORDER — DEXAMETHASONE 0.5 MG/5ML
1 ELIXIR ORAL
Qty: 4 | Refills: 0
Start: 2022-03-12

## 2022-03-12 RX ADMIN — Medication 500 MILLIGRAM(S): at 06:33

## 2022-03-12 RX ADMIN — Medication 3 MILLIGRAM(S): at 13:05

## 2022-03-12 RX ADMIN — Medication 3 MILLIGRAM(S): at 07:43

## 2022-03-12 RX ADMIN — Medication 3 MILLIGRAM(S): at 01:38

## 2022-03-12 RX ADMIN — Medication 10 MILLIGRAM(S): at 06:04

## 2022-03-12 RX ADMIN — Medication 10 MILLIGRAM(S): at 06:34

## 2022-03-12 RX ADMIN — PANTOPRAZOLE SODIUM 40 MILLIGRAM(S): 20 TABLET, DELAYED RELEASE ORAL at 06:03

## 2022-03-12 RX ADMIN — Medication 1 TABLET(S): at 12:01

## 2022-03-12 RX ADMIN — TIZANIDINE 4 MILLIGRAM(S): 4 TABLET ORAL at 12:01

## 2022-03-12 RX ADMIN — Medication 500 MILLIGRAM(S): at 06:03

## 2022-03-12 RX ADMIN — Medication 500 MILLIGRAM(S): at 11:20

## 2022-03-12 RX ADMIN — Medication 500 MILLIGRAM(S): at 10:48

## 2022-03-12 NOTE — PROGRESS NOTE ADULT - SUBJECTIVE AND OBJECTIVE BOX
Patient is a 66y old  Male who presents with a chief complaint of I'm having a decompression of my lumbar spine. (11 Mar 2022 10:29)      INTERVAL HPI/OVERNIGHT EVENTS: noted  pt seen and examined this am   events noted  pain well controlled      Vital Signs Last 24 Hrs  T(C): 37.1 (12 Mar 2022 08:16), Max: 37.2 (12 Mar 2022 00:13)  T(F): 98.7 (12 Mar 2022 08:16), Max: 98.9 (12 Mar 2022 00:13)  HR: 80 (12 Mar 2022 08:16) (72 - 80)  BP: 134/79 (12 Mar 2022 08:16) (123/68 - 134/79)  BP(mean): --  RR: 18 (12 Mar 2022 08:16) (17 - 18)  SpO2: 95% (12 Mar 2022 08:16) (95% - 97%)    acetaminophen     Tablet .. 500 milliGRAM(s) Oral every 6 hours  dexAMETHasone     Tablet 1 milliGRAM(s) Oral every 6 hours  ketorolac 10 milliGRAM(s) Oral every 6 hours  magnesium hydroxide Suspension 30 milliLiter(s) Oral every 12 hours PRN  multivitamin 1 Tablet(s) Oral daily  oxyCODONE    IR 5 milliGRAM(s) Oral every 4 hours PRN  pantoprazole    Tablet 40 milliGRAM(s) Oral before breakfast  senna 2 Tablet(s) Oral at bedtime  tamsulosin 0.4 milliGRAM(s) Oral at bedtime  tiZANidine 4 milliGRAM(s) Oral every 6 hours  traMADol 50 milliGRAM(s) Oral every 6 hours PRN  vancomycin  IVPB 1500 milliGRAM(s) IV Intermittent once      PHYSICAL EXAM:  GENERAL: NAD,   EYES: conjunctiva and sclera clear  ENMT: Moist mucous membranes  NECK: Supple, No JVD, Normal thyroid  CHEST/LUNG: non labored, cta b/l  HEART: Regular rate and rhythm; No murmurs, rubs, or gallops  ABDOMEN: Soft, Nontender, Nondistended; Bowel sounds present  EXTREMITIES:  2+ Peripheral Pulses, No clubbing, cyanosis, or edema  LYMPH: No lymphadenopathy noted  SKIN: No rashes or lesions    Consultant(s) Notes Reviewed:  [x ] YES  [ ] NO  Care Discussed with Consultants/Other Providers [ x] YES  [ ] NO    LABS:                        9.7    12.95 )-----------( 177      ( 12 Mar 2022 07:07 )             29.7     03-12    141  |  106  |  31<H>  ----------------------------<  128<H>  4.2   |  22  |  0.84    Ca    8.9      12 Mar 2022 07:07          CAPILLARY BLOOD GLUCOSE                  RADIOLOGY & ADDITIONAL TESTS:    Imaging Personally Reviewed:  [x ] YES  [ ] NO
Post op Day [1 ]    Patient resting without complaints.  No chest pain, SOB, N/V.  Preop had LBP w/ activity and LLE pain, with numbness and parasthesias in L Foot, as well as decreased sensation in glutes.  Postop, feels improved but today experiencing some parasthesias in R foot intermittently.     T(C): 36.6 (03-11-22 @ 04:44), Max: 37.6 (03-10-22 @ 22:57)  HR: 67 (03-11-22 @ 04:44) (63 - 76)  BP: 120/75 (03-11-22 @ 04:44) (108/62 - 140/60)  RR: 18 (03-11-22 @ 04:44) (17 - 20)  SpO2: 96% (03-11-22 @ 04:44) (94% - 100%)  Wt(kg): --    Exam:  Alert and Oriented, No Acute Distress  Back dsg c/d/i, x1 HV  +Hip flexion bilat  Glute sensation improved in L glute  Calves Soft, Non-tender bilaterally  +PF/DF/EHL/FHL 5/5 bilat, less coordination in L foot  L foot slight decrease sensation compared to R, otherwise SILT                          11.4   8.82  )-----------( 164      ( 10 Mar 2022 17:07 )             34.5    03-10    140  |  105  |  21  ----------------------------<  136<H>  4.2   |  25  |  0.87    Ca    8.8      10 Mar 2022 17:07        
Patient is a 66y old  Male who presents with a chief complaint of I'm having a decompression of my lumbar spine. (11 Mar 2022 10:29)      POST OPERATIVE DAY #:  [2]  Patient comfortable  No complaints  Anxious to go home  States gluteal numbness and LLE radiculopathy much improved    T(C): 36.9 (03-12-22 @ 04:19), Max: 37.2 (03-12-22 @ 00:13)  HR: 72 (03-12-22 @ 04:19) (71 - 82)  BP: 123/68 (03-12-22 @ 04:19) (123/68 - 134/75)  RR: 18 (03-12-22 @ 04:19) (17 - 18)  SpO2: 97% (03-12-22 @ 04:19) (96% - 97%)      PHYSICAL EXAM:  NAD, Alert  Back: Dressing C/D/I;  HMV in place 62/112   [shift/O\ N]         [ ] Upper extremity                    Bi          Tri        Delt                                                    R         5/5        5/5        5/5                                               L          5/5        5/5        5/5             [ ] Lower extremity                  PF          DF         EHL       FHL                                                                                            R        5/5        5/5        5/5       5/5                                                        L         5/5        5/5        5/5       5/5    SLR L < R   sensation grossly intact to light touch; (RLE)  Slightly diminished L foot /digits    No Calf tenderness B/L, PAS   2 (+) Distal Pulses;    LABS:                        9.7    12.95 )-----------( 177      ( 12 Mar 2022 07:07 )             29.7     03-11    140  |  104  |  20  ----------------------------<  148<H>  4.1   |  25  |  0.76    Ca    9.0      11 Mar 2022 06:56

## 2022-03-12 NOTE — DISCHARGE NOTE NURSING/CASE MANAGEMENT/SOCIAL WORK - NSDCPEFALRISK_GEN_ALL_CORE
For information on Fall & Injury Prevention, visit: [email or web address] OR  [email or web address] OR  https://www.cdc.gov/steadi/patient.html

## 2022-03-12 NOTE — PROGRESS NOTE ADULT - PROBLEM SELECTOR PLAN 1
PT/OT-WBAT  IS  DVT PPx-venodynes  Pain Control  Continue Current Tx.    Vijay Lei PA-C  Team Pager: #[phone]
***See Above  Alejandro ALFARO  Orthopedics  B: [phone]  S: [phone]
[employer]

## 2022-03-12 NOTE — DISCHARGE NOTE NURSING/CASE MANAGEMENT/SOCIAL WORK - PATIENT PORTAL LINK FT
You can access the FollowMyHealth Patient Portal offered by [facility] by registering at the following website: [email or web address]. By joining [facility]’s FollowMyHealth portal, you will also be able to view your health information using other applications (apps) compatible with our system.

## 2022-03-12 NOTE — PROGRESS NOTE ADULT - ASSESSMENT
Assessment: s/p Lami L1-4    Plan:   PT  WBAT  Cont [facility]       *  Will d/w Attending re: d/c drain today and d/c home  (PT cleared)
Patient is a 66y old  Male who presents with a chief complaint of L1-L4 lumbar laminectomy (11 Mar 2022 06:50)      HPI:  This is a 65 y/o male PMH BPH, GERD with esophageal narrowing, S/P esophageal dilation with resolution of dysphagia, lumbar disc disease, S/P lumbar laminectomy, 11/2021 started having progressively worsening pain in the hips and anterior thighs, has left foot drop. s/p L1-L4 decompression on 3/10/22. Consult called for post op medical mgmt.    s/p L1-L4 decompression on 3/10/22 (POD 1)  Incision care per Ortho  Monitor outpt via [facility]  Pain mgmt  Bowel regimen  Incentive spirometry  PT eval  Nunez to be removed today --> follow up TOV  Advance diet as tolerated  Care per Ortho    BPH:  Continue Flomax    GERD:  Protonix     DVT ppx:  IPCs    [facility]  [phone]    
A/p: 66yMale s/p Lumbar laminectomy L1-L4, doing well postoperatively.   VSS. NAD.

## 2022-03-23 ENCOUNTER — APPOINTMENT (OUTPATIENT)
Dept: ORTHOPEDIC SURGERY | Facility: CLINIC | Age: 67
End: 2022-03-23
Payer: MEDICARE

## 2022-03-23 PROCEDURE — 99024 POSTOP FOLLOW-UP VISIT: CPT

## 2022-03-23 NOTE — ASSESSMENT
[FreeTextEntry1] : This is a 66-year-old male who is here today for approximately 12 days out from lumbar decompression surgery.  My opinion he is doing very well.  He is walking long distances and is only on Tylenol for pain control.  At this point I would encourage him to continue his activity living.  He can take Tylenol for pain control.  I will see him again in 3 to 4 weeks for repeat clinical evaluation.  I encouraged him to follow-up sooner if he has any new or worsening symptoms

## 2022-03-23 NOTE — PHYSICAL EXAM
[de-identified] : Lumbar Physical Exam\par \par Gait - Normal\par \par Station - Normal\par \par Sagittal balance - Normal\par \par Compensatory mechanism? - None\par \par Heel walk - Normal\par \par Toe walk - Normal\par \par Reflexes\par Patellar - normal\par Gastroc - normal\par Clonus - No\par \par Hip Exam - Normal\par \par Straight leg raise - none\par \par Pulses - 2+ dp/pt\par \par Range of motion - normal\par \par Sensation \par Sensation intact to light touch in L1, L2, L3, L4, L5 and S1 dermatomes bilaterally, left foot numbness\par \par Motor\par 	IP	Quad	HS	TA	Gastroc	EHL\par Right	5/5	5/5	5/5	5/5	5/5	5/5\par Left	5/5	5/5	5/5	5/5	5/5	5/5\par \par The patient's incision is clean dry and intact

## 2022-03-23 NOTE — HISTORY OF PRESENT ILLNESS
[de-identified] : I had a long discussion with the patient regards to his treatment plan and diagnosis.  He is now approximately 12 days out from lumbar decompression surgery.  At this point in my opinion he is doing well.  He states that he has been walking several blocks, at one point he stated he has walked a mile.  He is only on Tylenol for pain control.  He states that the sharp stretching pain he had in his legs when he walks for any prolonged amount of time has not been present since his surgery.  He does have some residual numbness over his left foot.  He denies any issues in terms of bowel bladder function.  In fact his bowel function has improved.\par \par 03/07/22\par Today the patient states that he still dealing with bilateral leg symptoms and back pain.  He does state that majority of his pain is above his knees.  He denies any bowel bladder issues which are new.  He denies any saddle anesthesia.  He denies any episodes of incontinence.  He is here today to discuss surgical treatment.\par \par 02/23/22\par This is a 66-year-old male with a known history of lumbar spinal stenosis.  He presents with significant pain in his legs.  The majority of his pain is above his knee.  He does feel fatigued when he walks for prolonged amount of time.  He also has numbness over his left groin.  He also has numbness over his left foot.  He does occasionally have feelings that it is hard for him to have a bowel movement.  Thankfully he is able to control when he starts to stop urination.  He has no numbness over his penis or anus today.  He is able to walk he has no focal neurologic deficits

## 2022-04-12 ENCOUNTER — APPOINTMENT (OUTPATIENT)
Dept: MRI IMAGING | Facility: CLINIC | Age: 67
End: 2022-04-12
Payer: MEDICARE

## 2022-04-12 ENCOUNTER — NON-APPOINTMENT (OUTPATIENT)
Age: 67
End: 2022-04-12

## 2022-04-12 PROCEDURE — A9585: CPT | Mod: JW

## 2022-04-12 PROCEDURE — G1004: CPT

## 2022-04-12 PROCEDURE — 72158 MRI LUMBAR SPINE W/O & W/DYE: CPT | Mod: ME

## 2022-04-13 ENCOUNTER — APPOINTMENT (OUTPATIENT)
Dept: ORTHOPEDIC SURGERY | Facility: CLINIC | Age: 67
End: 2022-04-13
Payer: MEDICARE

## 2022-04-13 VITALS — BODY MASS INDEX: 25.03 KG/M2 | HEIGHT: 74 IN | WEIGHT: 195 LBS

## 2022-04-13 PROCEDURE — 99024 POSTOP FOLLOW-UP VISIT: CPT

## 2022-04-13 NOTE — ASSESSMENT
[FreeTextEntry1] : I long discussion with the patient regards to his treatment plan and diagnosis.  At this point I think he is doing overall well from the surgery.  He has some moving areas of numbness over his penis and anus.  At this point I would expect him to hopefully continue to recover some of the sensation.  I would like him to continue to walk as much as possible.  I would like him to continue with physical therapy at this point.  I will have him follow-up in approximately 4 weeks for repeat clinical evaluation.  I did encourage to reach out to me at any time if he has any new or worsening symptoms.  Patient is in agreement with this plan.  Overall he was pleased with his recovery today

## 2022-04-13 NOTE — HISTORY OF PRESENT ILLNESS
[de-identified] : This is a 66-year-old male that is now approximately 4 weeks out from lumbar decompression surgery.  Overall he is doing better in terms of his leg and radicular type pain.  He does have some numbness over his penis which was the reason for our MRI.  He denies any issues with bowel bladder control.  He is walking well.  He states that the radicular pain he had prior to surgery has greatly reduced.  Overall he is pleased with his function after surgery.  Of note the patient was able to maintain an erection and have an orgasm recently.\par \par 03/23/22\par I had a long discussion with the patient regards to his treatment plan and diagnosis.  He is now approximately 12 days out from lumbar decompression surgery.  At this point in my opinion he is doing well.  He states that he has been walking several blocks, at one point he stated he has walked a mile.  He is only on Tylenol for pain control.  He states that the sharp stretching pain he had in his legs when he walks for any prolonged amount of time has not been present since his surgery.  He does have some residual numbness over his left foot.  He denies any issues in terms of bowel bladder function.  In fact his bowel function has improved.\par \par 03/07/22\par Today the patient states that he still dealing with bilateral leg symptoms and back pain.  He does state that majority of his pain is above his knees.  He denies any bowel bladder issues which are new.  He denies any saddle anesthesia.  He denies any episodes of incontinence.  He is here today to discuss surgical treatment.\par \par 02/23/22\par This is a 66-year-old male with a known history of lumbar spinal stenosis.  He presents with significant pain in his legs.  The majority of his pain is above his knee.  He does feel fatigued when he walks for prolonged amount of time.  He also has numbness over his left groin.  He also has numbness over his left foot.  He does occasionally have feelings that it is hard for him to have a bowel movement.  Thankfully he is able to control when he starts to stop urination.  He has no numbness over his penis or anus today.  He is able to walk he has no focal neurologic deficits

## 2022-04-13 NOTE — PHYSICAL EXAM
[de-identified] : Lumbar Physical Exam\par \par Gait - Normal\par \par Station - Normal\par \par Sagittal balance - Normal\par \par Compensatory mechanism? - None\par \par Heel walk - Normal\par \par Toe walk - Normal\par \par Reflexes\par Patellar - normal\par Gastroc - normal\par Clonus - No\par \par Hip Exam - Normal\par \par Straight leg raise - none\par \par Pulses - 2+ dp/pt\par \par Range of motion - normal\par \par Sensation \par Sensation intact to light touch in L1, L2, L3, L4, L5 and S1 dermatomes bilaterally, left foot numbness\par \par Motor\par 	IP	Quad	HS	TA	Gastroc	EHL\par Right	5/5	5/5	5/5	5/5	5/5	5/5\par Left	5/5	5/5	5/5	5/5	5/5	5/5\par \par The patient's incision is clean dry and intact [de-identified] : Lumbar radiographs\par Coronal degenerative scoliosis noted\par No severe spondylolisthesis\par \par Lumbar MRI\par Severe spinal stenosis at L1-L2\par L2-L4 with moderate to severe areas of spinal stenosis\par \par Lumbar MRI with and without contrast reviewed\par Central decompression is complete from L1 down to L5\par There are residual areas of foraminal stenosis to be expected after central decompression

## 2022-05-18 ENCOUNTER — APPOINTMENT (OUTPATIENT)
Dept: ORTHOPEDIC SURGERY | Facility: CLINIC | Age: 67
End: 2022-05-18
Payer: MEDICARE

## 2022-05-18 VITALS
SYSTOLIC BLOOD PRESSURE: 130 MMHG | DIASTOLIC BLOOD PRESSURE: 68 MMHG | HEIGHT: 74 IN | WEIGHT: 195 LBS | BODY MASS INDEX: 25.03 KG/M2

## 2022-05-18 PROCEDURE — 99024 POSTOP FOLLOW-UP VISIT: CPT

## 2022-05-18 NOTE — PHYSICAL EXAM
[de-identified] : Lumbar Physical Exam\par \par Gait - Normal\par \par Station - Normal\par \par Sagittal balance - Normal\par \par Compensatory mechanism? - None\par \par Heel walk - Normal\par \par Toe walk - Normal\par \par Reflexes\par Patellar - normal\par Gastroc - normal\par Clonus - No\par \par Hip Exam - Normal\par \par Straight leg raise - none\par \par Pulses - 2+ dp/pt\par \par Range of motion - normal\par \par Sensation \par Sensation intact to light touch in L1, L2, L3, L4, L5 and S1 dermatomes bilaterally, left foot numbness\par \par Motor\par 	IP	Quad	HS	TA	Gastroc	EHL\par Right	5/5	5/5	5/5	5/5	5/5	5/5\par Left	5/5	5/5	5/5	5/5	5/5	5/5\par \par The patient's incision is clean dry and intact

## 2022-05-18 NOTE — HISTORY OF PRESENT ILLNESS
[de-identified] : This is a 66-year-old male who is now approximately 10 weeks out from lumbar decompression surgery.  Overall he has done very well.  He has no significant radicular pain.  He was able to go on vacation recently to the [facility].  He was able to swim and enjoy the pool without significant pain or discomfort.  He has no bowel bladder issues.  He is walking well.  Overall he is pleased with his recovery to date and he did state to me today that his pain is dramatically improved as compared to prior to surgery.\par \par 04/13/22\par This is a 66-year-old male that is now approximately 4 weeks out from lumbar decompression surgery.  Overall he is doing better in terms of his leg and radicular type pain.  He does have some numbness over his penis which was the reason for our MRI.  He denies any issues with bowel bladder control.  He is walking well.  He states that the radicular pain he had prior to surgery has greatly reduced.  Overall he is pleased with his function after surgery.  Of note the patient was able to maintain an erection and have an orgasm recently.\par \par 03/23/22\par I had a long discussion with the patient regards to his treatment plan and diagnosis.  He is now approximately 12 days out from lumbar decompression surgery.  At this point in my opinion he is doing well.  He states that he has been walking several blocks, at one point he stated he has walked a mile.  He is only on Tylenol for pain control.  He states that the sharp stretching pain he had in his legs when he walks for any prolonged amount of time has not been present since his surgery.  He does have some residual numbness over his left foot.  He denies any issues in terms of bowel bladder function.  In fact his bowel function has improved.\par \par 03/07/22\par Today the patient states that he still dealing with bilateral leg symptoms and back pain.  He does state that majority of his pain is above his knees.  He denies any bowel bladder issues which are new.  He denies any saddle anesthesia.  He denies any episodes of incontinence.  He is here today to discuss surgical treatment.\par \par 02/23/22\par This is a 66-year-old male with a known history of lumbar spinal stenosis.  He presents with significant pain in his legs.  The majority of his pain is above his knee.  He does feel fatigued when he walks for prolonged amount of time.  He also has numbness over his left groin.  He also has numbness over his left foot.  He does occasionally have feelings that it is hard for him to have a bowel movement.  Thankfully he is able to control when he starts to stop urination.  He has no numbness over his penis or anus today.  He is able to walk he has no focal neurologic deficits

## 2022-05-18 NOTE — HISTORY OF PRESENT ILLNESS
[de-identified] : This is a 66-year-old male who is now approximately 10 weeks out from lumbar decompression surgery.  Overall he has done very well.  He has no significant radicular pain.  He was able to go on vacation recently to the [facility].  He was able to swim and enjoy the pool without significant pain or discomfort.  He has no bowel bladder issues.  He is walking well.  Overall he is pleased with his recovery to date and he did state to me today that his pain is dramatically improved as compared to prior to surgery.\par \par 04/13/22\par This is a 66-year-old male that is now approximately 4 weeks out from lumbar decompression surgery.  Overall he is doing better in terms of his leg and radicular type pain.  He does have some numbness over his penis which was the reason for our MRI.  He denies any issues with bowel bladder control.  He is walking well.  He states that the radicular pain he had prior to surgery has greatly reduced.  Overall he is pleased with his function after surgery.  Of note the patient was able to maintain an erection and have an orgasm recently.\par \par 03/23/22\par I had a long discussion with the patient regards to his treatment plan and diagnosis.  He is now approximately 12 days out from lumbar decompression surgery.  At this point in my opinion he is doing well.  He states that he has been walking several blocks, at one point he stated he has walked a mile.  He is only on Tylenol for pain control.  He states that the sharp stretching pain he had in his legs when he walks for any prolonged amount of time has not been present since his surgery.  He does have some residual numbness over his left foot.  He denies any issues in terms of bowel bladder function.  In fact his bowel function has improved.\par \par 03/07/22\par Today the patient states that he still dealing with bilateral leg symptoms and back pain.  He does state that majority of his pain is above his knees.  He denies any bowel bladder issues which are new.  He denies any saddle anesthesia.  He denies any episodes of incontinence.  He is here today to discuss surgical treatment.\par \par 02/23/22\par This is a 66-year-old male with a known history of lumbar spinal stenosis.  He presents with significant pain in his legs.  The majority of his pain is above his knee.  He does feel fatigued when he walks for prolonged amount of time.  He also has numbness over his left groin.  He also has numbness over his left foot.  He does occasionally have feelings that it is hard for him to have a bowel movement.  Thankfully he is able to control when he starts to stop urination.  He has no numbness over his penis or anus today.  He is able to walk he has no focal neurologic deficits

## 2022-05-18 NOTE — ASSESSMENT
[FreeTextEntry1] : I had a long discussion with the patient today in regards to his treatment plan and diagnosis.  He has done very well since surgery.  At this point I would encourage him to continue with his activities of daily living.  We will write another prescription for physical therapy.  I will have him follow-up in approximately 6 weeks for repeat clinical evaluation.  I encouraged him to follow-up sooner if he has any new or worsening symptoms

## 2022-05-18 NOTE — PHYSICAL EXAM
[de-identified] : Lumbar Physical Exam\par \par Gait - Normal\par \par Station - Normal\par \par Sagittal balance - Normal\par \par Compensatory mechanism? - None\par \par Heel walk - Normal\par \par Toe walk - Normal\par \par Reflexes\par Patellar - normal\par Gastroc - normal\par Clonus - No\par \par Hip Exam - Normal\par \par Straight leg raise - none\par \par Pulses - 2+ dp/pt\par \par Range of motion - normal\par \par Sensation \par Sensation intact to light touch in L1, L2, L3, L4, L5 and S1 dermatomes bilaterally, left foot numbness\par \par Motor\par 	IP	Quad	HS	TA	Gastroc	EHL\par Right	5/5	5/5	5/5	5/5	5/5	5/5\par Left	5/5	5/5	5/5	5/5	5/5	5/5\par \par The patient's incision is clean dry and intact

## 2022-06-21 NOTE — H&P PST ADULT - GENERAL
[Follow-Up Visit_____] : a follow-up visit for [unfilled] [Urinary Incontinence] : urinary incontinence negative

## 2022-08-29 NOTE — ED PROVIDER NOTE - CCCP TRG CHIEF CMPLNT
[facility] MEDICARE WELLNESS VISIT NEEDED letter sent to patient.    
Outreach attempt was made to schedule a Medicare Wellness Visit. This was the first attempt. Contact was not made, left message.    
Outreach attempt was made to schedule a Medicare Wellness Visit. This was the second attempt. Contact was not made, left message.    
back pain general

## 2022-09-19 ENCOUNTER — APPOINTMENT (OUTPATIENT)
Dept: ORTHOPEDIC SURGERY | Facility: CLINIC | Age: 67
End: 2022-09-19

## 2022-09-19 VITALS
BODY MASS INDEX: 25.15 KG/M2 | WEIGHT: 196 LBS | HEIGHT: 74 IN | DIASTOLIC BLOOD PRESSURE: 72 MMHG | SYSTOLIC BLOOD PRESSURE: 125 MMHG

## 2022-09-19 PROCEDURE — 99214 OFFICE O/P EST MOD 30 MIN: CPT

## 2022-09-19 NOTE — PHYSICAL EXAM
[de-identified] : Lumbar Physical Exam\par \par Gait - Normal\par \par Station - Normal\par \par Sagittal balance - Normal\par \par Compensatory mechanism? - None\par \par Heel walk - Normal\par \par Toe walk - Normal\par \par Reflexes\par Patellar - normal\par Gastroc - normal\par Clonus - No\par \par Hip Exam - Normal\par \par Straight leg raise - none\par \par Pulses - 2+ dp/pt\par \par Range of motion - normal\par \par Sensation \par Sensation intact to light touch in L1, L2, L3, L4, L5 and S1 dermatomes bilaterally, left foot numbness\par \par Motor\par 	IP	Quad	HS	TA	Gastroc	EHL\par Right	5/5	5/5	5/5	5/5	5/5	5/5\par Left	5/5	5/5	5/5	5/5	5/5	5/5\par \par The patient's incision is clean dry and intact [de-identified] : Lumbar radiographs\par Coronal degenerative scoliosis noted\par No severe spondylolisthesis\par \par Lumbar MRI\par Severe spinal stenosis at L1-L2\par L2-L4 with moderate to severe areas of spinal stenosis\par \par Lumbar MRI with and without contrast reviewed\par Central decompression is complete from L1 down to L5\par There are residual areas of foraminal stenosis to be expected after central decompression

## 2022-09-19 NOTE — HISTORY OF PRESENT ILLNESS
[de-identified] : This is a 66-year-old male that is now approximate 6 months out from his lumbar decompression surgery.  He has been doing very well up until a couple weeks ago.  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  At this point he is dealing with some back pain.  He has some buttock pain as well.  He is here today to discuss neck steps in treatment.\par \par 05/18/22\par This is a 66-year-old male who is now approximately 10 weeks out from lumbar decompression surgery.  Overall he has done very well.  He has no significant radicular pain.  He was able to go on vacation recently to the [facility].  He was able to swim and enjoy the pool without significant pain or discomfort.  He has no bowel bladder issues.  He is walking well.  Overall he is pleased with his recovery to date and he did state to me today that his pain is dramatically improved as compared to prior to surgery.\par \par 04/13/22\par This is a 66-year-old male that is now approximately 4 weeks out from lumbar decompression surgery.  Overall he is doing better in terms of his leg and radicular type pain.  He does have some numbness over his penis which was the reason for our MRI.  He denies any issues with bowel bladder control.  He is walking well.  He states that the radicular pain he had prior to surgery has greatly reduced.  Overall he is pleased with his function after surgery.  Of note the patient was able to maintain an erection and have an orgasm recently.\par \par 03/23/22\par I had a long discussion with the patient regards to his treatment plan and diagnosis.  He is now approximately 12 days out from lumbar decompression surgery.  At this point in my opinion he is doing well.  He states that he has been walking several blocks, at one point he stated he has walked a mile.  He is only on Tylenol for pain control.  He states that the sharp stretching pain he had in his legs when he walks for any prolonged amount of time has not been present since his surgery.  He does have some residual numbness over his left foot.  He denies any issues in terms of bowel bladder function.  In fact his bowel function has improved.\par \par 03/07/22\par Today the patient states that he still dealing with bilateral leg symptoms and back pain.  He does state that majority of his pain is above his knees.  He denies any bowel bladder issues which are new.  He denies any saddle anesthesia.  He denies any episodes of incontinence.  He is here today to discuss surgical treatment.\par \par 02/23/22\par This is a 66-year-old male with a known history of lumbar spinal stenosis.  He presents with significant pain in his legs.  The majority of his pain is above his knee.  He does feel fatigued when he walks for prolonged amount of time.  He also has numbness over his left groin.  He also has numbness over his left foot.  He does occasionally have feelings that it is hard for him to have a bowel movement.  Thankfully he is able to control when he starts to stop urination.  He has no numbness over his penis or anus today.  He is able to walk he has no focal neurologic deficits

## 2022-09-19 NOTE — ASSESSMENT
[FreeTextEntry1] : At this point given his return of symptoms we will proceed with a new lumbar MRI with and without contrast.  We may be able to treat him with an epidural steroid injection depending on what is shown by this lumbar MRI.  He should continue with his activity daily living.  Overall we both agree that he is not in any sort of severe discomfort.  As compared to prior to surgery he has had a dramatic improvement in his overall symptoms.  We would like to ensure there is no residual stenosis.

## 2022-09-27 ENCOUNTER — APPOINTMENT (OUTPATIENT)
Dept: MRI IMAGING | Facility: CLINIC | Age: 67
End: 2022-09-27

## 2022-10-03 ENCOUNTER — APPOINTMENT (OUTPATIENT)
Dept: ORTHOPEDIC SURGERY | Facility: CLINIC | Age: 67
End: 2022-10-03

## 2022-10-03 VITALS
HEART RATE: 76 BPM | WEIGHT: 196 LBS | DIASTOLIC BLOOD PRESSURE: 76 MMHG | BODY MASS INDEX: 25.15 KG/M2 | HEIGHT: 74 IN | SYSTOLIC BLOOD PRESSURE: 122 MMHG

## 2022-10-03 DIAGNOSIS — S33.5XXD SPRAIN OF LIGAMENTS OF LUMBAR SPINE, SUBSEQUENT ENCOUNTER: ICD-10-CM

## 2022-10-03 PROCEDURE — 99214 OFFICE O/P EST MOD 30 MIN: CPT

## 2022-10-03 NOTE — HISTORY OF PRESENT ILLNESS
[de-identified] : This is a 66-year-old male who is now approximately 6 and half months out from lumbar decompression surgery.  He denies any bowel bladder issues.  He denies any saddle anesthesia.  He does state that he has some fairly severe low back pain.  He is here today to go over his MRI results.  He does state that he still able to walk and do the majority of his activities daily living.  He does have certain positions however which do exacerbate his back pain.\par \par 09/19/22\par This is a 66-year-old male that is now approximate 6 months out from his lumbar decompression surgery.  He has been doing very well up until a couple weeks ago.  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  At this point he is dealing with some back pain.  He has some buttock pain as well.  He is here today to discuss neck steps in treatment.\par \par 05/18/22\par This is a 66-year-old male who is now approximately 10 weeks out from lumbar decompression surgery.  Overall he has done very well.  He has no significant radicular pain.  He was able to go on vacation recently to the [facility].  He was able to swim and enjoy the pool without significant pain or discomfort.  He has no bowel bladder issues.  He is walking well.  Overall he is pleased with his recovery to date and he did state to me today that his pain is dramatically improved as compared to prior to surgery.\par \par 04/13/22\par This is a 66-year-old male that is now approximately 4 weeks out from lumbar decompression surgery.  Overall he is doing better in terms of his leg and radicular type pain.  He does have some numbness over his penis which was the reason for our MRI.  He denies any issues with bowel bladder control.  He is walking well.  He states that the radicular pain he had prior to surgery has greatly reduced.  Overall he is pleased with his function after surgery.  Of note the patient was able to maintain an erection and have an orgasm recently.\par \par 03/23/22\par I had a long discussion with the patient regards to his treatment plan and diagnosis.  He is now approximately 12 days out from lumbar decompression surgery.  At this point in my opinion he is doing well.  He states that he has been walking several blocks, at one point he stated he has walked a mile.  He is only on Tylenol for pain control.  He states that the sharp stretching pain he had in his legs when he walks for any prolonged amount of time has not been present since his surgery.  He does have some residual numbness over his left foot.  He denies any issues in terms of bowel bladder function.  In fact his bowel function has improved.\par \par 03/07/22\par Today the patient states that he still dealing with bilateral leg symptoms and back pain.  He does state that majority of his pain is above his knees.  He denies any bowel bladder issues which are new.  He denies any saddle anesthesia.  He denies any episodes of incontinence.  He is here today to discuss surgical treatment.\par \par 02/23/22\par This is a 66-year-old male with a known history of lumbar spinal stenosis.  He presents with significant pain in his legs.  The majority of his pain is above his knee.  He does feel fatigued when he walks for prolonged amount of time.  He also has numbness over his left groin.  He also has numbness over his left foot.  He does occasionally have feelings that it is hard for him to have a bowel movement.  Thankfully he is able to control when he starts to stop urination.  He has no numbness over his penis or anus today.  He is able to walk he has no focal neurologic deficits

## 2022-10-03 NOTE — ASSESSMENT
[FreeTextEntry1] : I had a long discussion with the patient in regards to his treatment plan and diagnosis.  I discussed with him various treatment options.  Majority of his symptoms are in his low back.  I think focusing on trying to get his core and lumbar muscle stronger with physical therapy is appropriate at this point.  I did discuss with him that any further surgery would likely be fusion type procedure to address any further areas of compression.  In my opinion he is not in such disabling pain or dysfunction that this type of aggressive intervention would be warranted.  Furthermore he has not exhausted conservative measures.  I will like him to try a good george effort of physical therapy for 4 weeks.  At that point he may be a candidate for an epidural steroid injection if he still having symptoms.  I encouraged him to continue to stay active as well.  The patient has been with this plan

## 2022-10-03 NOTE — PHYSICAL EXAM
[de-identified] : Lumbar Physical Exam\par \par Gait - Normal\par \par Station - Normal\par \par Sagittal balance - Normal\par \par Compensatory mechanism? - None\par \par Heel walk - Normal\par \par Toe walk - Normal\par \par Reflexes\par Patellar - normal\par Gastroc - normal\par Clonus - No\par \par Hip Exam - Normal\par \par Straight leg raise - none\par \par Pulses - 2+ dp/pt\par \par Range of motion - normal\par \par Sensation \par Sensation intact to light touch in L1, L2, L3, L4, L5 and S1 dermatomes bilaterally, left foot numbness\par \par Motor\par 	IP	Quad	HS	TA	Gastroc	EHL\par Right	5/5	5/5	5/5	5/5	5/5	5/5\par Left	5/5	5/5	5/5	5/5	5/5	5/5\par \par The patient's incision is clean dry and intact\par \par I was not able to break any sort of myotome on his left leg [de-identified] : Lumbar radiographs\par Coronal degenerative scoliosis noted\par No severe spondylolisthesis\par \par Lumbar MRI\par Severe spinal stenosis at L1-L2\par L2-L4 with moderate to severe areas of spinal stenosis\par \par Lumbar MRI with and without contrast reviewed\par Central decompression is complete from L1 down to L5\par There are residual areas of foraminal stenosis to be expected after central decompression\par \par New lumbar MRI with and without contrast reviewed\par Central decompression is complete\par There are some areas of residual lateral recess stenosis, foraminal stenosis

## 2022-10-31 ENCOUNTER — APPOINTMENT (OUTPATIENT)
Dept: ORTHOPEDIC SURGERY | Facility: CLINIC | Age: 67
End: 2022-10-31

## 2022-10-31 VITALS
BODY MASS INDEX: 25.15 KG/M2 | HEIGHT: 74 IN | TEMPERATURE: 97.8 F | OXYGEN SATURATION: 98 % | DIASTOLIC BLOOD PRESSURE: 77 MMHG | HEART RATE: 65 BPM | SYSTOLIC BLOOD PRESSURE: 123 MMHG | WEIGHT: 196 LBS

## 2022-10-31 PROCEDURE — 99214 OFFICE O/P EST MOD 30 MIN: CPT

## 2022-10-31 NOTE — ASSESSMENT
[FreeTextEntry1] : I had a long discussion with the patient in regards to his treatment plan diagnosis.  He is back to doing the vast vast majority of his activities of daily living.  He has only slight residual back discomfort.  He has no radiating type pain.  I think he will continue to do well with conservative management.  I encouraged him to continue with physical therapy.  I will have him follow-up with me in approximate 3 months.  Encouraged him to reach out to me at any point if his symptoms worsen or change in any way.

## 2022-10-31 NOTE — PHYSICAL EXAM
[de-identified] : Lumbar Physical Exam\par \par Gait - Normal\par \par Station - Normal\par \par Sagittal balance - Normal\par \par Compensatory mechanism? - None\par \par Heel walk - Normal\par \par Toe walk - Normal\par \par Reflexes\par Patellar - normal\par Gastroc - normal\par Clonus - No\par \par Hip Exam - Normal\par \par Straight leg raise - none\par \par Pulses - 2+ dp/pt\par \par Range of motion - normal\par \par Sensation \par Sensation intact to light touch in L1, L2, L3, L4, L5 and S1 dermatomes bilaterally, left foot numbness\par \par Motor\par 	IP	Quad	HS	TA	Gastroc	EHL\par Right	5/5	5/5	5/5	5/5	5/5	5/5\par Left	5/5	5/5	5/5	5/5	5/5	5/5\par \par The patient's incision is clean dry and intact\par \par I was not able to break any sort of myotome on his left leg [de-identified] : Lumbar radiographs\par Coronal degenerative scoliosis noted\par No severe spondylolisthesis\par \par Lumbar MRI\par Severe spinal stenosis at L1-L2\par L2-L4 with moderate to severe areas of spinal stenosis\par \par Lumbar MRI with and without contrast reviewed\par Central decompression is complete from L1 down to L5\par There are residual areas of foraminal stenosis to be expected after central decompression\par \par New lumbar MRI with and without contrast reviewed\par Central decompression is complete\par There are some areas of residual lateral recess stenosis, foraminal stenosis

## 2022-10-31 NOTE — HISTORY OF PRESENT ILLNESS
[de-identified] : Today the patient states overall he is doing well.  He is approximately 7 months out from surgery.  He states he was fishing all weekend.  He denies any radiating shooting type pain currently.  He does have some buttock discomfort but this comes and goes.  He is able to do the vast majority of his actives of daily without significant issue at all.\par \par 10/03/22\par This is a 66-year-old male who is now approximately 6 and half months out from lumbar decompression surgery.  He denies any bowel bladder issues.  He denies any saddle anesthesia.  He does state that he has some fairly severe low back pain.  He is here today to go over his MRI results.  He does state that he still able to walk and do the majority of his activities daily living.  He does have certain positions however which do exacerbate his back pain.\par \par 09/19/22\par This is a 66-year-old male that is now approximate 6 months out from his lumbar decompression surgery.  He has been doing very well up until a couple weeks ago.  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  At this point he is dealing with some back pain.  He has some buttock pain as well.  He is here today to discuss neck steps in treatment.\par \par 05/18/22\par This is a 66-year-old male who is now approximately 10 weeks out from lumbar decompression surgery.  Overall he has done very well.  He has no significant radicular pain.  He was able to go on vacation recently to the [facility].  He was able to swim and enjoy the pool without significant pain or discomfort.  He has no bowel bladder issues.  He is walking well.  Overall he is pleased with his recovery to date and he did state to me today that his pain is dramatically improved as compared to prior to surgery.\par \par 04/13/22\par This is a 66-year-old male that is now approximately 4 weeks out from lumbar decompression surgery.  Overall he is doing better in terms of his leg and radicular type pain.  He does have some numbness over his penis which was the reason for our MRI.  He denies any issues with bowel bladder control.  He is walking well.  He states that the radicular pain he had prior to surgery has greatly reduced.  Overall he is pleased with his function after surgery.  Of note the patient was able to maintain an erection and have an orgasm recently.\par \par 03/23/22\par I had a long discussion with the patient regards to his treatment plan and diagnosis.  He is now approximately 12 days out from lumbar decompression surgery.  At this point in my opinion he is doing well.  He states that he has been walking several blocks, at one point he stated he has walked a mile.  He is only on Tylenol for pain control.  He states that the sharp stretching pain he had in his legs when he walks for any prolonged amount of time has not been present since his surgery.  He does have some residual numbness over his left foot.  He denies any issues in terms of bowel bladder function.  In fact his bowel function has improved.\par \par 03/07/22\par Today the patient states that he still dealing with bilateral leg symptoms and back pain.  He does state that majority of his pain is above his knees.  He denies any bowel bladder issues which are new.  He denies any saddle anesthesia.  He denies any episodes of incontinence.  He is here today to discuss surgical treatment.\par \par 02/23/22\par This is a 66-year-old male with a known history of lumbar spinal stenosis.  He presents with significant pain in his legs.  The majority of his pain is above his knee.  He does feel fatigued when he walks for prolonged amount of time.  He also has numbness over his left groin.  He also has numbness over his left foot.  He does occasionally have feelings that it is hard for him to have a bowel movement.  Thankfully he is able to control when he starts to stop urination.  He has no numbness over his penis or anus today.  He is able to walk he has no focal neurologic deficits

## 2023-02-02 ENCOUNTER — APPOINTMENT (OUTPATIENT)
Dept: ORTHOPEDIC SURGERY | Facility: CLINIC | Age: 68
End: 2023-02-02
Payer: MEDICARE

## 2023-02-02 VITALS
TEMPERATURE: 97.6 F | WEIGHT: 195 LBS | HEIGHT: 74 IN | OXYGEN SATURATION: 98 % | DIASTOLIC BLOOD PRESSURE: 79 MMHG | HEART RATE: 73 BPM | BODY MASS INDEX: 25.03 KG/M2 | SYSTOLIC BLOOD PRESSURE: 134 MMHG

## 2023-02-02 PROCEDURE — 99214 OFFICE O/P EST MOD 30 MIN: CPT

## 2023-02-02 NOTE — ADDENDUM
[FreeTextEntry1] : I, Eric Lobo, acted solely as a scribe for Dr. Ramírez Murray MD on this date 02/02/2023  .\par  \par All medical record entries made by the Scribe were at my, Dr. Ramírez Murray MD., direction and personally dictated by me on 02/02/2023 . I have reviewed the chart and agree that the record accurately reflects my personal performance of the history, physical exam, assessment and plan. I have also personally directed, reviewed, and agreed with the chart.

## 2023-02-02 NOTE — ASSESSMENT
[FreeTextEntry1] : 67 year old male progressing well 10 months s/p lumbar decompression surgery. He is back to doing the vast vast majority of his activities of daily living.  He has only slight residual back discomfort which I believe is muscular. He has no radiating type pain. I think he will continue to do well with conservative management.  I encouraged him to continue with physical therapy. I provided a referral to [facility] for consultation, possible injection.  I will have him follow-up with me in approximate 5 -6 weeks.  Encouraged him to reach out to me at any point if his symptoms worsen or change in any way.

## 2023-02-02 NOTE — PHYSICAL EXAM
[de-identified] : Lumbar Physical Exam\par \par Gait - Normal\par \par Station - Normal\par \par Sagittal balance - Normal\par \par Compensatory mechanism? - None\par \par Heel walk - Normal\par \par Toe walk - Normal\par \par Reflexes\par Patellar - normal\par Gastroc - normal\par Clonus - No\par \par Hip Exam - Normal\par \par Straight leg raise - none\par \par Pulses - 2+ dp/pt\par \par Range of motion - normal\par \par Sensation \par Sensation intact to light touch in L1, L2, L3, L4, L5 and S1 dermatomes bilaterally, left foot numbness\par \par Motor\par 	IP	Quad	HS	TA	Gastroc	EHL\par Right	5/5	5/5	5/5	5/5	5/5	5/5\par Left	5/5	5/5	5/5	5/5	5/5	5/5\par \par The patient's incision is clean dry and intact\par \par I was not able to break any sort of myotome on his left leg [de-identified] : Lumbar radiographs\par Coronal degenerative scoliosis noted\par No severe spondylolisthesis\par \par Lumbar MRI\par Severe spinal stenosis at L1-L2\par L2-L4 with moderate to severe areas of spinal stenosis\par \par Lumbar MRI with and without contrast reviewed\par Central decompression is complete from L1 down to L5\par There are residual areas of foraminal stenosis to be expected after central decompression\par \par New lumbar MRI with and without contrast reviewed\par Central decompression is complete\par There are some areas of residual lateral recess stenosis, foraminal stenosis

## 2023-02-02 NOTE — HISTORY OF PRESENT ILLNESS
[de-identified] : Today the patient states overall he is doing well 10 months out from lumbar decompression surgery. Patient states he has intermittent shooting pain in his groin area. It can occur while at rest or with movement. Patient states prolonged walking and sitting may cause mild ache. He states he would be able to walk 5 blocks.  He denies any bowel bladder issues.  He denies any saddle anesthesia.\par \par 10/31/22\par Today the patient states overall he is doing well.  He is approximately 7 months out from surgery.  He states he was fishing all weekend.  He denies any radiating shooting type pain currently.  He does have some buttock discomfort but this comes and goes.  He is able to do the vast majority of his actives of daily without significant issue at all.\par \par 10/03/22\par This is a 66-year-old male who is now approximately 6 and half months out from lumbar decompression surgery.  He denies any bowel bladder issues.  He denies any saddle anesthesia.  He does state that he has some fairly severe low back pain.  He is here today to go over his MRI results.  He does state that he still able to walk and do the majority of his activities daily living.  He does have certain positions however which do exacerbate his back pain.\par \par 09/19/22\par This is a 66-year-old male that is now approximate 6 months out from his lumbar decompression surgery.  He has been doing very well up until a couple weeks ago.  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  At this point he is dealing with some back pain.  He has some buttock pain as well.  He is here today to discuss neck steps in treatment.\par \par 05/18/22\par This is a 66-year-old male who is now approximately 10 weeks out from lumbar decompression surgery.  Overall he has done very well.  He has no significant radicular pain.  He was able to go on vacation recently to the [facility].  He was able to swim and enjoy the pool without significant pain or discomfort.  He has no bowel bladder issues.  He is walking well.  Overall he is pleased with his recovery to date and he did state to me today that his pain is dramatically improved as compared to prior to surgery.\par \par 04/13/22\par This is a 66-year-old male that is now approximately 4 weeks out from lumbar decompression surgery.  Overall he is doing better in terms of his leg and radicular type pain.  He does have some numbness over his penis which was the reason for our MRI.  He denies any issues with bowel bladder control.  He is walking well.  He states that the radicular pain he had prior to surgery has greatly reduced.  Overall he is pleased with his function after surgery.  Of note the patient was able to maintain an erection and have an orgasm recently.\par \par 03/23/22\par I had a long discussion with the patient regards to his treatment plan and diagnosis.  He is now approximately 12 days out from lumbar decompression surgery.  At this point in my opinion he is doing well.  He states that he has been walking several blocks, at one point he stated he has walked a mile.  He is only on Tylenol for pain control.  He states that the sharp stretching pain he had in his legs when he walks for any prolonged amount of time has not been present since his surgery.  He does have some residual numbness over his left foot.  He denies any issues in terms of bowel bladder function.  In fact his bowel function has improved.\par \par 03/07/22\par Today the patient states that he still dealing with bilateral leg symptoms and back pain.  He does state that majority of his pain is above his knees.  He denies any bowel bladder issues which are new.  He denies any saddle anesthesia.  He denies any episodes of incontinence.  He is here today to discuss surgical treatment.\par \par 02/23/22\par This is a 66-year-old male with a known history of lumbar spinal stenosis.  He presents with significant pain in his legs.  The majority of his pain is above his knee.  He does feel fatigued when he walks for prolonged amount of time.  He also has numbness over his left groin.  He also has numbness over his left foot.  He does occasionally have feelings that it is hard for him to have a bowel movement.  Thankfully he is able to control when he starts to stop urination.  He has no numbness over his penis or anus today.  He is able to walk he has no focal neurologic deficits

## 2023-03-10 ENCOUNTER — APPOINTMENT (OUTPATIENT)
Dept: ORTHOPEDIC SURGERY | Facility: CLINIC | Age: 68
End: 2023-03-10
Payer: MEDICARE

## 2023-03-10 VITALS — SYSTOLIC BLOOD PRESSURE: 134 MMHG | HEART RATE: 66 BPM | DIASTOLIC BLOOD PRESSURE: 84 MMHG

## 2023-03-10 PROCEDURE — 99214 OFFICE O/P EST MOD 30 MIN: CPT

## 2023-03-10 NOTE — ASSESSMENT
[FreeTextEntry1] : 67 year old male progressing well 12 months s/p lumbar decompression surgery. He is back to doing the vast majority of his activities of daily living.  He has only slight residual back discomfort which I believe is muscular. He has no radiating type pain. Giving the nature of his sxs I think he will benefit best by continuing with conservative management.  I encouraged him to continue with physical therapy and to continue being managed by [facility]. I encouraged him to continue with his Acupuncture therapy and trigger point injections as well as whatever  recommends. Overall, he reports his pain is manageable and agrees with our plan to continue with conservative treatment.  I will have him follow-up with me in approximate 5 -6 weeks.  Encouraged him to reach out to me at any point if his symptoms worsen or change in any way.

## 2023-03-10 NOTE — HISTORY OF PRESENT ILLNESS
[de-identified] : 67 year old male who presents for follow-up evaluation of his lumbar decompression surgery about 12 months out. Patient reports he went to [facility] and started Acupuncture therapy as well as trigger injections. Patient reports the treatments provided minimal relief. Patient reports he is still experiencing pain when bending over or reaching down to pick something up. Patient also reports he is able to reproduce his pain by switching positions while sitting or laying down. \par He denies any saddle anesthesia, denies any bowel/bladder incontinence \par \par 02/02/2023\par Today the patient states overall he is doing well 10 months out from lumbar decompression surgery. Patient states he has intermittent shooting pain in his groin area. It can occur while at rest or with movement. Patient states prolonged walking and sitting may cause mild ache. He states he would be able to walk 5 blocks.  He denies any bowel bladder issues.  He denies any saddle anesthesia.\par \par 10/31/22\par Today the patient states overall he is doing well.  He is approximately 7 months out from surgery.  He states he was fishing all weekend.  He denies any radiating shooting type pain currently.  He does have some buttock discomfort but this comes and goes.  He is able to do the vast majority of his actives of daily without significant issue at all.\par \par 10/03/22\par This is a 66-year-old male who is now approximately 6 and half months out from lumbar decompression surgery.  He denies any bowel bladder issues.  He denies any saddle anesthesia.  He does state that he has some fairly severe low back pain.  He is here today to go over his MRI results.  He does state that he still able to walk and do the majority of his activities daily living.  He does have certain positions however which do exacerbate his back pain.\par \par 09/19/22\par This is a 66-year-old male that is now approximate 6 months out from his lumbar decompression surgery.  He has been doing very well up until a couple weeks ago.  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  At this point he is dealing with some back pain.  He has some buttock pain as well.  He is here today to discuss neck steps in treatment.\par \par 05/18/22\par This is a 66-year-old male who is now approximately 10 weeks out from lumbar decompression surgery.  Overall he has done very well.  He has no significant radicular pain.  He was able to go on vacation recently to the [facility].  He was able to swim and enjoy the pool without significant pain or discomfort.  He has no bowel bladder issues.  He is walking well.  Overall he is pleased with his recovery to date and he did state to me today that his pain is dramatically improved as compared to prior to surgery.\par \par 04/13/22\par This is a 66-year-old male that is now approximately 4 weeks out from lumbar decompression surgery.  Overall he is doing better in terms of his leg and radicular type pain.  He does have some numbness over his penis which was the reason for our MRI.  He denies any issues with bowel bladder control.  He is walking well.  He states that the radicular pain he had prior to surgery has greatly reduced.  Overall he is pleased with his function after surgery.  Of note the patient was able to maintain an erection and have an orgasm recently.\par \par 03/23/22\par I had a long discussion with the patient regards to his treatment plan and diagnosis.  He is now approximately 12 days out from lumbar decompression surgery.  At this point in my opinion he is doing well.  He states that he has been walking several blocks, at one point he stated he has walked a mile.  He is only on Tylenol for pain control.  He states that the sharp stretching pain he had in his legs when he walks for any prolonged amount of time has not been present since his surgery.  He does have some residual numbness over his left foot.  He denies any issues in terms of bowel bladder function.  In fact his bowel function has improved.\par \par 03/07/22\par Today the patient states that he still dealing with bilateral leg symptoms and back pain.  He does state that majority of his pain is above his knees.  He denies any bowel bladder issues which are new.  He denies any saddle anesthesia.  He denies any episodes of incontinence.  He is here today to discuss surgical treatment.\par \par 02/23/22\par This is a 66-year-old male with a known history of lumbar spinal stenosis.  He presents with significant pain in his legs.  The majority of his pain is above his knee.  He does feel fatigued when he walks for prolonged amount of time.  He also has numbness over his left groin.  He also has numbness over his left foot.  He does occasionally have feelings that it is hard for him to have a bowel movement.  Thankfully he is able to control when he starts to stop urination.  He has no numbness over his penis or anus today.  He is able to walk he has no focal neurologic deficits

## 2023-03-10 NOTE — ADDENDUM
[FreeTextEntry1] : I, Carly Gilbert, acted solely as a scribe for Dr. Ramírez Murray MD on this date 03/10/2023  \par \par All medical record entries made by the Scribe were at my, Dr. Ramírez Murray MD., direction and personally dictated by me on 03/10/2023 . I have reviewed the chart and agree that the record accurately reflects my personal performance of the history, physical exam, assessment and plan. I have also personally directed, reviewed, and agreed with the chart.

## 2023-03-10 NOTE — PHYSICAL EXAM
[de-identified] : Lumbar Physical Exam\par \par Gait - Normal\par \par Station - Normal\par \par Sagittal balance - Normal\par \par Compensatory mechanism? - None\par \par Heel walk - Normal\par \par Toe walk - Normal\par \par Reflexes\par Patellar - normal\par Gastroc - normal\par Clonus - No\par \par Hip Exam - Normal\par \par Straight leg raise - none\par \par Pulses - 2+ dp/pt\par \par Range of motion - normal\par \par Sensation \par Sensation intact to light touch in L1, L2, L3, L4, L5 and S1 dermatomes bilaterally, left foot numbness\par \par Motor\par 	IP	Quad	HS	TA	Gastroc	EHL\par Right	5/5	5/5	5/5	5/5	5/5	5/5\par Left	5/5	5/5	5/5	5/5	5/5	5/5\par \par I was not able to break any sort of myotome on his left leg [de-identified] : Lumbar radiographs\par Coronal degenerative scoliosis noted\par No severe spondylolisthesis\par \par Lumbar MRI\par Severe spinal stenosis at L1-L2\par L2-L4 with moderate to severe areas of spinal stenosis\par \par Lumbar MRI with and without contrast reviewed\par Central decompression is complete from L1 down to L5\par There are residual areas of foraminal stenosis to be expected after central decompression\par \par New lumbar MRI with and without contrast reviewed\par Central decompression is complete\par There are some areas of residual lateral recess stenosis, foraminal stenosis

## 2023-04-21 ENCOUNTER — APPOINTMENT (OUTPATIENT)
Dept: ORTHOPEDIC SURGERY | Facility: CLINIC | Age: 68
End: 2023-04-21
Payer: MEDICARE

## 2023-04-21 VITALS
HEART RATE: 60 BPM | HEIGHT: 74 IN | WEIGHT: 197 LBS | TEMPERATURE: 97.6 F | OXYGEN SATURATION: 98 % | BODY MASS INDEX: 25.28 KG/M2

## 2023-04-21 DIAGNOSIS — M51.36 OTHER INTERVERTEBRAL DISC DEGENERATION, LUMBAR REGION: ICD-10-CM

## 2023-04-21 PROCEDURE — 99214 OFFICE O/P EST MOD 30 MIN: CPT

## 2023-04-21 NOTE — ASSESSMENT
[FreeTextEntry1] : Overall I am pleased with Mr. Gregory's progress.  He should continue pain management.  He should continue being as active as possible.  I will see him again in 6 months.  I encouraged to reach out to me at any point if his symptoms worsen or change in any way

## 2023-04-21 NOTE — PHYSICAL EXAM
[de-identified] : Lumbar Physical Exam\par \par Gait - Normal\par \par Station - Normal\par \par Sagittal balance - Normal\par \par Compensatory mechanism? - None\par \par Heel walk - Normal\par \par Toe walk - Normal\par \par Reflexes\par Patellar - normal\par Gastroc - normal\par Clonus - No\par \par Hip Exam - Normal\par \par Straight leg raise - none\par \par Pulses - 2+ dp/pt\par \par Range of motion - normal\par \par Sensation \par Sensation intact to light touch in L1, L2, L3, L4, L5 and S1 dermatomes bilaterally, left foot numbness\par \par Motor\par 	IP	Quad	HS	TA	Gastroc	EHL\par Right	5/5	5/5	5/5	5/5	5/5	5/5\par Left	5/5	5/5	5/5	5/5	5/5	5/5\par \par The patient's incision is clean dry and intact\par \par I was not able to break any sort of myotome on his left leg [de-identified] : Lumbar radiographs\par Coronal degenerative scoliosis noted\par No severe spondylolisthesis\par \par Lumbar MRI\par Severe spinal stenosis at L1-L2\par L2-L4 with moderate to severe areas of spinal stenosis\par \par Lumbar MRI with and without contrast reviewed\par Central decompression is complete from L1 down to L5\par There are residual areas of foraminal stenosis to be expected after central decompression\par \par New lumbar MRI with and without contrast reviewed\par Central decompression is complete\par There are some areas of residual lateral recess stenosis, foraminal stenosis

## 2023-04-21 NOTE — HISTORY OF PRESENT ILLNESS
[de-identified] : 66 yo male following for his low back pain. He saw Dr. Craft and had an epidural steroid injection. He is planning to see Dr. Craft within the next week or so for another follow up and possible injection. He states he had some relief though still has pain that increases when he tries to move, he feels it happens almost every hour. He denies any bowel or bladder dysfunction or saddle anesthesia. \par \par 3/10/23\par 67 year old male who presents for follow-up evaluation of his lumbar decompression surgery about 12 months out. Patient reports he went to [facility] and started Acupuncture therapy as well as trigger injections. Patient reports the treatments provided minimal relief. Patient reports he is still experiencing pain when bending over or reaching down to pick something up. Patient also reports he is able to reproduce his pain by switching positions while sitting or laying down. \par He denies any saddle anesthesia, denies any bowel/bladder incontinence \par \par 02/02/2023\par Today the patient states overall he is doing well 10 months out from lumbar decompression surgery. Patient states he has intermittent shooting pain in his groin area. It can occur while at rest or with movement. Patient states prolonged walking and sitting may cause mild ache. He states he would be able to walk 5 blocks.  He denies any bowel bladder issues.  He denies any saddle anesthesia.\par \par 10/31/22\par Today the patient states overall he is doing well.  He is approximately 7 months out from surgery.  He states he was fishing all weekend.  He denies any radiating shooting type pain currently.  He does have some buttock discomfort but this comes and goes.  He is able to do the vast majority of his actives of daily without significant issue at all.\par \par 10/03/22\par This is a 66-year-old male who is now approximately 6 and half months out from lumbar decompression surgery.  He denies any bowel bladder issues.  He denies any saddle anesthesia.  He does state that he has some fairly severe low back pain.  He is here today to go over his MRI results.  He does state that he still able to walk and do the majority of his activities daily living.  He does have certain positions however which do exacerbate his back pain.\par \par 09/19/22\par This is a 66-year-old male that is now approximate 6 months out from his lumbar decompression surgery.  He has been doing very well up until a couple weeks ago.  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  At this point he is dealing with some back pain.  He has some buttock pain as well.  He is here today to discuss neck steps in treatment.\par \par 05/18/22\par This is a 66-year-old male who is now approximately 10 weeks out from lumbar decompression surgery.  Overall he has done very well.  He has no significant radicular pain.  He was able to go on vacation recently to the [facility].  He was able to swim and enjoy the pool without significant pain or discomfort.  He has no bowel bladder issues.  He is walking well.  Overall he is pleased with his recovery to date and he did state to me today that his pain is dramatically improved as compared to prior to surgery.\par \par 04/13/22\par This is a 66-year-old male that is now approximately 4 weeks out from lumbar decompression surgery.  Overall he is doing better in terms of his leg and radicular type pain.  He does have some numbness over his penis which was the reason for our MRI.  He denies any issues with bowel bladder control.  He is walking well.  He states that the radicular pain he had prior to surgery has greatly reduced.  Overall he is pleased with his function after surgery.  Of note the patient was able to maintain an erection and have an orgasm recently.\par \par 03/23/22\par I had a long discussion with the patient regards to his treatment plan and diagnosis.  He is now approximately 12 days out from lumbar decompression surgery.  At this point in my opinion he is doing well.  He states that he has been walking several blocks, at one point he stated he has walked a mile.  He is only on Tylenol for pain control.  He states that the sharp stretching pain he had in his legs when he walks for any prolonged amount of time has not been present since his surgery.  He does have some residual numbness over his left foot.  He denies any issues in terms of bowel bladder function.  In fact his bowel function has improved.\par \par 03/07/22\par Today the patient states that he still dealing with bilateral leg symptoms and back pain.  He does state that majority of his pain is above his knees.  He denies any bowel bladder issues which are new.  He denies any saddle anesthesia.  He denies any episodes of incontinence.  He is here today to discuss surgical treatment.\par \par 02/23/22\par This is a 66-year-old male with a known history of lumbar spinal stenosis.  He presents with significant pain in his legs.  The majority of his pain is above his knee.  He does feel fatigued when he walks for prolonged amount of time.  He also has numbness over his left groin.  He also has numbness over his left foot.  He does occasionally have feelings that it is hard for him to have a bowel movement.  Thankfully he is able to control when he starts to stop urination.  He has no numbness over his penis or anus today.  He is able to walk he has no focal neurologic deficits

## 2023-07-05 NOTE — H&P PST ADULT - NSANTHSNORERD_ENT_A_CORE
Consent: The patient's consent was obtained including but not limited to risks of crusting, scabbing, blistering, scarring, darker or lighter pigmentary change, recurrence, incomplete removal and infection. No

## 2023-10-30 ENCOUNTER — APPOINTMENT (OUTPATIENT)
Dept: ORTHOPEDIC SURGERY | Facility: CLINIC | Age: 68
End: 2023-10-30

## 2024-04-24 ENCOUNTER — INPATIENT (INPATIENT)
Facility: HOSPITAL | Age: 69
LOS: 0 days | Discharge: HOME | DRG: 101 | End: 2024-04-25
Attending: INTERNAL MEDICINE | Admitting: INTERNAL MEDICINE
Payer: COMMERCIAL

## 2024-04-24 VITALS
OXYGEN SATURATION: 100 % | HEART RATE: 98 BPM | TEMPERATURE: 98 F | SYSTOLIC BLOOD PRESSURE: 114 MMHG | DIASTOLIC BLOOD PRESSURE: 80 MMHG | RESPIRATION RATE: 16 BRPM

## 2024-04-24 DIAGNOSIS — Z98.890 OTHER SPECIFIED POSTPROCEDURAL STATES: Chronic | ICD-10-CM

## 2024-04-24 DIAGNOSIS — R56.9 UNSPECIFIED CONVULSIONS: ICD-10-CM

## 2024-04-24 DIAGNOSIS — Z29.9 ENCOUNTER FOR PROPHYLACTIC MEASURES, UNSPECIFIED: ICD-10-CM

## 2024-04-24 DIAGNOSIS — Z87.438 PERSONAL HISTORY OF OTHER DISEASES OF MALE GENITAL ORGANS: ICD-10-CM

## 2024-04-24 DIAGNOSIS — K21.9 GASTRO-ESOPHAGEAL REFLUX DISEASE WITHOUT ESOPHAGITIS: ICD-10-CM

## 2024-04-24 DIAGNOSIS — I63.9 CEREBRAL INFARCTION, UNSPECIFIED: ICD-10-CM

## 2024-04-24 LAB
ALBUMIN SERPL ELPH-MCNC: 3.9 G/DL — SIGNIFICANT CHANGE UP (ref 3.3–5)
ALP SERPL-CCNC: 53 U/L — SIGNIFICANT CHANGE UP (ref 40–120)
ALT FLD-CCNC: 24 U/L — SIGNIFICANT CHANGE UP (ref 12–78)
ANION GAP SERPL CALC-SCNC: 11 MMOL/L — SIGNIFICANT CHANGE UP (ref 5–17)
APPEARANCE UR: CLEAR — SIGNIFICANT CHANGE UP
APTT BLD: 27.6 SEC — SIGNIFICANT CHANGE UP (ref 24.5–35.6)
AST SERPL-CCNC: 19 U/L — SIGNIFICANT CHANGE UP (ref 15–37)
BASOPHILS # BLD AUTO: 0 K/UL — SIGNIFICANT CHANGE UP (ref 0–0.2)
BASOPHILS NFR BLD AUTO: 0 % — SIGNIFICANT CHANGE UP (ref 0–2)
BILIRUB SERPL-MCNC: 0.9 MG/DL — SIGNIFICANT CHANGE UP (ref 0.2–1.2)
BILIRUB UR-MCNC: NEGATIVE — SIGNIFICANT CHANGE UP
BUN SERPL-MCNC: 23 MG/DL — SIGNIFICANT CHANGE UP (ref 7–23)
CALCIUM SERPL-MCNC: 8.8 MG/DL — SIGNIFICANT CHANGE UP (ref 8.5–10.1)
CHLORIDE SERPL-SCNC: 106 MMOL/L — SIGNIFICANT CHANGE UP (ref 96–108)
CK SERPL-CCNC: 226 U/L — SIGNIFICANT CHANGE UP (ref 26–308)
CO2 SERPL-SCNC: 23 MMOL/L — SIGNIFICANT CHANGE UP (ref 22–31)
COLOR SPEC: YELLOW — SIGNIFICANT CHANGE UP
CREAT SERPL-MCNC: 1 MG/DL — SIGNIFICANT CHANGE UP (ref 0.5–1.3)
DIFF PNL FLD: NEGATIVE — SIGNIFICANT CHANGE UP
EGFR: 82 ML/MIN/1.73M2 — SIGNIFICANT CHANGE UP
EOSINOPHIL # BLD AUTO: 0.19 K/UL — SIGNIFICANT CHANGE UP (ref 0–0.5)
EOSINOPHIL NFR BLD AUTO: 2 % — SIGNIFICANT CHANGE UP (ref 0–6)
GLUCOSE SERPL-MCNC: 174 MG/DL — HIGH (ref 70–99)
GLUCOSE UR QL: NEGATIVE MG/DL — SIGNIFICANT CHANGE UP
HCT VFR BLD CALC: 38.7 % — LOW (ref 39–50)
HGB BLD-MCNC: 13.2 G/DL — SIGNIFICANT CHANGE UP (ref 13–17)
INR BLD: 0.95 RATIO — SIGNIFICANT CHANGE UP (ref 0.85–1.18)
KETONES UR-MCNC: ABNORMAL MG/DL
LACTATE SERPL-SCNC: 0.8 MMOL/L — SIGNIFICANT CHANGE UP (ref 0.7–2)
LACTATE SERPL-SCNC: 6.5 MMOL/L — CRITICAL HIGH (ref 0.7–2)
LEUKOCYTE ESTERASE UR-ACNC: NEGATIVE — SIGNIFICANT CHANGE UP
LYMPHOCYTES # BLD AUTO: 2.52 K/UL — SIGNIFICANT CHANGE UP (ref 1–3.3)
LYMPHOCYTES # BLD AUTO: 26 % — SIGNIFICANT CHANGE UP (ref 13–44)
MCHC RBC-ENTMCNC: 28.6 PG — SIGNIFICANT CHANGE UP (ref 27–34)
MCHC RBC-ENTMCNC: 34.1 GM/DL — SIGNIFICANT CHANGE UP (ref 32–36)
MCV RBC AUTO: 83.9 FL — SIGNIFICANT CHANGE UP (ref 80–100)
MONOCYTES # BLD AUTO: 0.58 K/UL — SIGNIFICANT CHANGE UP (ref 0–0.9)
MONOCYTES NFR BLD AUTO: 6 % — SIGNIFICANT CHANGE UP (ref 2–14)
NEUTROPHILS # BLD AUTO: 6.39 K/UL — SIGNIFICANT CHANGE UP (ref 1.8–7.4)
NEUTROPHILS NFR BLD AUTO: 66 % — SIGNIFICANT CHANGE UP (ref 43–77)
NITRITE UR-MCNC: NEGATIVE — SIGNIFICANT CHANGE UP
NRBC # BLD: SIGNIFICANT CHANGE UP /100 WBCS (ref 0–0)
PCP SPEC-MCNC: SIGNIFICANT CHANGE UP
PH UR: 6 — SIGNIFICANT CHANGE UP (ref 5–8)
PLATELET # BLD AUTO: 267 K/UL — SIGNIFICANT CHANGE UP (ref 150–400)
POTASSIUM SERPL-MCNC: 4 MMOL/L — SIGNIFICANT CHANGE UP (ref 3.5–5.3)
POTASSIUM SERPL-SCNC: 4 MMOL/L — SIGNIFICANT CHANGE UP (ref 3.5–5.3)
PROT SERPL-MCNC: 7.3 G/DL — SIGNIFICANT CHANGE UP (ref 6–8.3)
PROT UR-MCNC: NEGATIVE MG/DL — SIGNIFICANT CHANGE UP
PROTHROM AB SERPL-ACNC: 11.1 SEC — SIGNIFICANT CHANGE UP (ref 9.5–13)
RBC # BLD: 4.61 M/UL — SIGNIFICANT CHANGE UP (ref 4.2–5.8)
RBC # FLD: 12.7 % — SIGNIFICANT CHANGE UP (ref 10.3–14.5)
SODIUM SERPL-SCNC: 140 MMOL/L — SIGNIFICANT CHANGE UP (ref 135–145)
SP GR SPEC: 1.02 — SIGNIFICANT CHANGE UP (ref 1–1.03)
TROPONIN I, HIGH SENSITIVITY RESULT: 75.1 NG/L — SIGNIFICANT CHANGE UP
UROBILINOGEN FLD QL: 0.2 MG/DL — SIGNIFICANT CHANGE UP (ref 0.2–1)
WBC # BLD: 9.68 K/UL — SIGNIFICANT CHANGE UP (ref 3.8–10.5)
WBC # FLD AUTO: 9.68 K/UL — SIGNIFICANT CHANGE UP (ref 3.8–10.5)

## 2024-04-24 PROCEDURE — 70498 CT ANGIOGRAPHY NECK: CPT | Mod: 26,MC

## 2024-04-24 PROCEDURE — 70551 MRI BRAIN STEM W/O DYE: CPT | Mod: 26,MC

## 2024-04-24 PROCEDURE — 70496 CT ANGIOGRAPHY HEAD: CPT | Mod: 26,MC

## 2024-04-24 PROCEDURE — 71045 X-RAY EXAM CHEST 1 VIEW: CPT | Mod: 26

## 2024-04-24 PROCEDURE — 99285 EMERGENCY DEPT VISIT HI MDM: CPT

## 2024-04-24 PROCEDURE — 70450 CT HEAD/BRAIN W/O DYE: CPT | Mod: 26,XU,MC

## 2024-04-24 RX ORDER — LANOLIN ALCOHOL/MO/W.PET/CERES
3 CREAM (GRAM) TOPICAL AT BEDTIME
Refills: 0 | Status: DISCONTINUED | OUTPATIENT
Start: 2024-04-24 | End: 2024-04-25

## 2024-04-24 RX ORDER — LEVETIRACETAM 250 MG/1
250 TABLET, FILM COATED ORAL
Refills: 0 | Status: DISCONTINUED | OUTPATIENT
Start: 2024-04-24 | End: 2024-04-24

## 2024-04-24 RX ORDER — ASPIRIN/CALCIUM CARB/MAGNESIUM 324 MG
81 TABLET ORAL DAILY
Refills: 0 | Status: DISCONTINUED | OUTPATIENT
Start: 2024-04-24 | End: 2024-04-25

## 2024-04-24 RX ORDER — ACETAMINOPHEN 500 MG
650 TABLET ORAL EVERY 6 HOURS
Refills: 0 | Status: DISCONTINUED | OUTPATIENT
Start: 2024-04-24 | End: 2024-04-25

## 2024-04-24 RX ORDER — ENOXAPARIN SODIUM 100 MG/ML
40 INJECTION SUBCUTANEOUS EVERY 24 HOURS
Refills: 0 | Status: DISCONTINUED | OUTPATIENT
Start: 2024-04-24 | End: 2024-04-25

## 2024-04-24 RX ORDER — DIPHENHYDRAMINE HYDROCHLORIDE AND LIDOCAINE HYDROCHLORIDE AND ALUMINUM HYDROXIDE AND MAGNESIUM HYDRO
5 KIT
Refills: 0 | Status: DISCONTINUED | OUTPATIENT
Start: 2024-04-24 | End: 2024-04-25

## 2024-04-24 RX ORDER — LEVETIRACETAM 250 MG/1
750 TABLET, FILM COATED ORAL
Refills: 0 | Status: DISCONTINUED | OUTPATIENT
Start: 2024-04-24 | End: 2024-04-25

## 2024-04-24 RX ORDER — PANTOPRAZOLE SODIUM 20 MG/1
40 TABLET, DELAYED RELEASE ORAL
Refills: 0 | Status: DISCONTINUED | OUTPATIENT
Start: 2024-04-24 | End: 2024-04-25

## 2024-04-24 RX ORDER — LEVETIRACETAM 250 MG/1
500 TABLET, FILM COATED ORAL
Refills: 0 | Status: DISCONTINUED | OUTPATIENT
Start: 2024-04-24 | End: 2024-04-24

## 2024-04-24 RX ORDER — TAMSULOSIN HYDROCHLORIDE 0.4 MG/1
0.8 CAPSULE ORAL AT BEDTIME
Refills: 0 | Status: DISCONTINUED | OUTPATIENT
Start: 2024-04-24 | End: 2024-04-25

## 2024-04-24 RX ORDER — DIAZEPAM 5 MG
5 TABLET ORAL
Refills: 0 | Status: DISCONTINUED | OUTPATIENT
Start: 2024-04-24 | End: 2024-04-25

## 2024-04-24 RX ORDER — LEVETIRACETAM 250 MG/1
1000 TABLET, FILM COATED ORAL ONCE
Refills: 0 | Status: COMPLETED | OUTPATIENT
Start: 2024-04-24 | End: 2024-04-24

## 2024-04-24 RX ORDER — SODIUM CHLORIDE 9 MG/ML
1000 INJECTION INTRAMUSCULAR; INTRAVENOUS; SUBCUTANEOUS
Refills: 0 | Status: DISCONTINUED | OUTPATIENT
Start: 2024-04-24 | End: 2024-04-25

## 2024-04-24 RX ORDER — ATORVASTATIN CALCIUM 80 MG/1
40 TABLET, FILM COATED ORAL AT BEDTIME
Refills: 0 | Status: DISCONTINUED | OUTPATIENT
Start: 2024-04-24 | End: 2024-04-25

## 2024-04-24 RX ADMIN — Medication 3 MILLIGRAM(S): at 21:14

## 2024-04-24 RX ADMIN — DIPHENHYDRAMINE HYDROCHLORIDE AND LIDOCAINE HYDROCHLORIDE AND ALUMINUM HYDROXIDE AND MAGNESIUM HYDRO 5 MILLILITER(S): KIT at 21:14

## 2024-04-24 RX ADMIN — ENOXAPARIN SODIUM 40 MILLIGRAM(S): 100 INJECTION SUBCUTANEOUS at 15:05

## 2024-04-24 RX ADMIN — Medication 2 MILLIGRAM(S): at 10:01

## 2024-04-24 RX ADMIN — ATORVASTATIN CALCIUM 40 MILLIGRAM(S): 80 TABLET, FILM COATED ORAL at 21:14

## 2024-04-24 RX ADMIN — LEVETIRACETAM 750 MILLIGRAM(S): 250 TABLET, FILM COATED ORAL at 18:29

## 2024-04-24 RX ADMIN — PANTOPRAZOLE SODIUM 40 MILLIGRAM(S): 20 TABLET, DELAYED RELEASE ORAL at 18:23

## 2024-04-24 RX ADMIN — TAMSULOSIN HYDROCHLORIDE 0.8 MILLIGRAM(S): 0.4 CAPSULE ORAL at 21:14

## 2024-04-24 RX ADMIN — LEVETIRACETAM 400 MILLIGRAM(S): 250 TABLET, FILM COATED ORAL at 15:04

## 2024-04-24 RX ADMIN — SODIUM CHLORIDE 125 MILLILITER(S): 9 INJECTION INTRAMUSCULAR; INTRAVENOUS; SUBCUTANEOUS at 10:01

## 2024-04-24 NOTE — H&P ADULT - HISTORY OF PRESENT ILLNESS
68-year-old male with history of GERD, BPH presenting with altered mental status.  Patient was alert and orientated x 3 during visit, accompanied by family at bedside.  Patient states he was up walking his dogs in the morning walking his dogs.  Patient went to bed shortly after and the next thing he remembers is being restrained by police officers.  Patient states he has not history of seizures.  Patient endorses tongue biting.  Patient states he had a short spell of dizziness yesterday while watching TV that resolved shortly.  Wife states she heard a yell during the morning and saw her  passed out.  Wife states that the patient was not convulsing.  Patient denies fevers, sick contacts, recent travel, shortness of breath, recent travel.      In the ED pts VS were T(C): 36.8  T(F): 98.2  HR: 78  BP: 150/80  RR: 18  SpO2: 97%  Labs show: H.2  WBC: 9.68 Plt:  267 Creatinine: 1.00  CT scans shows  CT HEAD:  1.  No evidence of acute intracranial hemorrhage or midline shift.  2.  Chronic ischemic changes. If there is continued concern for acute   neurologic compromise, recommend MRI of the brain for further evaluation.    CTA BRAIN:  1.  No evidence of intracranial aneurysm, critical stenosis, or abrupt   cut off of intraluminal contrast.    CTA NECK:  1.  No evidence of critical stenosis by NASCET criteria.  MR shows   IMPRESSION:  No acute infarct, hemorrhage, or mass lesion. No potential seizure focus   identified.  Chronic lacunar infarct in the right cerebellum.    EKG shows NSR w/ prolonged QT  s/p keppra, lorazepam in ED     Pt is admitted for further workup and management

## 2024-04-24 NOTE — H&P ADULT - NSICDXPASTMEDICALHX_GEN_ALL_CORE_FT
PAST MEDICAL HISTORY:  GERD (gastroesophageal reflux disease)     History of BPH      PAST MEDICAL HISTORY:  Benign prostatic hyperplasia     GERD (gastroesophageal reflux disease)     GERD (gastroesophageal reflux disease)     History of BPH     Melanoma     Moderate mitral regurgitation     MVP (mitral valve prolapse)

## 2024-04-24 NOTE — H&P ADULT - NEUROLOGICAL
normal/cranial nerves II-XII intact details… AAOx 3/normal/cranial nerves II-XII intact/sensation intact/deep reflexes intact/cranial nerves intact

## 2024-04-24 NOTE — ED ADULT TRIAGE NOTE - PATIENT ON (OXYGEN DELIVERY METHOD)
PATIENT IS REQUESTING A WRITTEN RX FOR HIS PEN NEEDLES TO  TODAY. HE STATES THAT [facility] KEEPS TELLING HIM THEY HAVE NOT RECEIVED THIS RX.      CALL BACK [phone]     room air

## 2024-04-24 NOTE — ED PROVIDER NOTE - NEUROLOGICAL, MLM
Minimally responsive, mild agitation. ESPANA freely, not following commands, garbled speech. No facial droop, not posturing, pupils 3mm and sluggish.

## 2024-04-24 NOTE — H&P ADULT - ATTENDING COMMENTS
68-year-old male with history of GERD, BPH presenting with altered mental status, admitted for  New onset of seizure. 68-year-old male with history of GERD, BPH presenting with altered mental status, admitted for  New onset of seizure.  Pt seen, Examined, case & care plan d/w pt, residents at detail.  Consults-  Neurology-DR Puckett  PT Eval  SZ Precautions  Fall Precautions  Aspiration Precautions.  SCD  AM labs PO diet   D/W Family at bedside.

## 2024-04-24 NOTE — ED ADULT NURSE NOTE - CHIEF COMPLAINT QUOTE
As per wife patient was unresponsive. Patient is very lethargic now. Altered mental status. C/O Dizziness. Versed 5 mg I/M given.

## 2024-04-24 NOTE — EEG REPORT - NS EEG TEXT BOX
GABE BLOOD [id number]     Study Date: 04-24-24 12:37-13:09  Duration: 31 min  --------------------------------------------------------------------------------------------------  History:  CC/ HPI Patient is a 68y old  Male who presents with a chief complaint of   MEDICATIONS  (STANDING):  enoxaparin Injectable 40 milliGRAM(s) SubCutaneous every 24 hours  levETIRAcetam 250 milliGRAM(s) Oral two times a day  levETIRAcetam  IVPB 1000 milliGRAM(s) IV Intermittent Once  pantoprazole    Tablet 40 milliGRAM(s) Oral before breakfast  sodium chloride 0.9%. 1000 milliLiter(s) (125 mL/Hr) IV Continuous <Continuous>  tamsulosin 0.8 milliGRAM(s) Oral at bedtime    --------------------------------------------------------------------------------------------------  Study Interpretation:    [[[Abbreviation Key:  PDR=alpha rhythm/posterior dominant rhythm. A-P=anterior posterior.  Amplitude: ‘very low’:<20; ‘low’:20-49; ‘medium’:; ‘high’:>150uV.  Persistence for periodic/rhythmic patterns (% of epoch) ‘rare’:<1%; ‘occasional’:1-10%; ‘frequent’:10-50%; ‘abundant’:50-90%; ‘continuous’:>90%.  Persistence for sporadic discharges: ‘rare’:<1/hr; ‘occasional’:1/min-1/hr; ‘frequent’:>1/min; ‘abundant’:>1/10 sec.  RPP=rhythmic and periodic patterns; GRDA=generalized rhythmic delta activity; FIRDA=frontal intermittent GRDA; LRDA=lateralized rhythmic delta activity; TIRDA=temporal intermittent rhythmic delta activity;  LPD=PLED=lateralized periodic discharges; GPD=generalized periodic discharges; BIPDs =bilateral independent periodic discharges; Mf=multifocal; SIRPDs=stimulus induced rhythmic, periodic, or ictal appearing discharges; BIRDs=brief potentially ictal rhythmic discharges >4 Hz, lasting .5-10s; PFA (paroxysmal bursts >13 Hz or =8 Hz <10s).  Modifiers: +F=with fast component; +S=with spike component; +R=with rhythmic component.  S-B=burst suppression pattern.  Max=maximal. N1-drowsy; N2-stage II sleep; N3-slow wave sleep. SSS/BETS=small sharp spikes/benign epileptiform transients of sleep. HV=hyperventilation; PS=photic stimulation]]]    Daily EEG Visual Analysis    FINDINGS:      Background:  Continuity: Continuous  Symmetry: Symmetric  Posterior dominant rhythm (PDR): 11 Hz, reactive to eye closure. Symmetric low-amplitude frontal beta in wakefulness.  Voltage: Normal  Anterior-Posterior Gradient: Present  Other background findings: Diffuse low-amplitude beta activity  Breach: Absent    Background Slowing:  Generalized slowing: None  Focal slowing: None    State Changes:   Drowsiness is characterized by fragmentation, attenuation, and slowing of the background activity.  Stage 2 sleep is characterized by symmetric K complexes and sleep spindles.     Interictal Findings:  None    Electrographic and Electroclinical seizures:  None    Other Clinical Events:  None    Activation Procedures:   Hyperventilation is not performed.    Photic stimulation is performed and does not elicit any abnormalities.      Artifacts:  Intermittent myogenic and movement artifacts are present.    EKG:  Single-lead EKG shows regular rhythm and PVCs.    EEG Classification / Summary:  Normal routine EEG in the awake, drowsy, and mostly asleep states.   No focal or epileptiform abnormalities are captured.     Clinical Impression:  A normal EEG neither refutes nor supports a diagnosis of epilepsy.          -------------------------------------------------------------------------------------------------------    Susy Coats MD  Attending Physician, [facility]ile[facility] [place]    -------------------------------------------------------------------------------------------------------    To reach EEG technologist:  Please use the pager number for the appropriate hospital or contact the .  At [facility] - Pager #: [phone]    To reach EEG-reading physician:  [facility] EEG Reading Room Phone #: [phone]  Epilepsy Answering Service after 5PM and before 8:30AM: Phone #: [phone]

## 2024-04-24 NOTE — ED PROVIDER NOTE - OBJECTIVE STATEMENT
68-year-old male with history of GERD presenting with altered mental status.  Patient was found in his bed by his wife approximately 1 hour prior to arrival after letting out a scream, and being found confused, minimally responsive.  When EMS arrived he was agitated, appeared to have bit his tongue, received 5 mg of Versed for agitation.  There was no reported trauma.  Patient was seen well around 7 AM.  He has been reporting dizziness for the past 3 days.  No recent fever, cough, chills, chest pain, shortness of breath, headache, abdominal pain, recent illness.  No alcohol or drug use.

## 2024-04-24 NOTE — ED ADULT NURSE NOTE - PRIMARY CARE PROVIDER
unk Mirvaso Counseling: Mirvaso is a topical medication which can decrease superficial blood flow where applied. Side effects are uncommon and include stinging, redness and allergic reactions.

## 2024-04-24 NOTE — ED PROVIDER NOTE - CLINICAL SUMMARY MEDICAL DECISION MAKING FREE TEXT BOX
Please reference above for HPI, including history and exam.  Asessment/plan:  ddx: seizure, r/o ICH, metabolic causes, intox, sepsis  plan: labs, ekg, CXR, bolus, cultures, Ativan 2mg IV, CT brain, CTA head/neck, CMM, FS Please reference above for HPI, including history and exam.  Asessment/plan:  ddx: seizure, r/o ICH, metabolic causes, intox, sepsis  plan: labs, ekg, CXR, bolus, cultures, Ativan 2mg IV, CT brain, CTA head/neck, CMM, FS  mdm: Patient with elevated lactate, other labs WNL. Utox negative. CT brain, CTA head/neck, MRI brain all unremarkable. Patient with almost complete return to baseline at this time, HD stable. Given history and exam, and elevated lactate, most consistent with new onset seizure. Patient will require tele admission, and neuro evaluation.

## 2024-04-24 NOTE — H&P ADULT - ASSESSMENT
68-year-old male with history of GERD, BPH presenting with altered mental status, admitted for seizure 68-year-old male with history of GERD, BPH presenting with altered mental status, admitted for  New onset of seizure. 68-year-old male with history of GERD, BPH presenting with altered mental status-Post ictal state, admitted for  New onset of seizure.

## 2024-04-24 NOTE — H&P ADULT - GASTROINTESTINAL
normal/soft/nontender/nondistended/normal active bowel sounds negative details… normal/soft/nontender/nondistended/normal active bowel sounds/no guarding/no rigidity/no organomegaly/no palpable william/no masses palpable

## 2024-04-24 NOTE — ED ADULT NURSE NOTE - OBJECTIVE STATEMENT
pt responding to vervol and touch at this time "  Per wife found  unresponsive  on bed after walking dog this AM" no sob, no n/v, FROM 4 extremities, no body weakness , pupils sluggish reaction  4 B/L

## 2024-04-24 NOTE — H&P ADULT - PROBLEM SELECTOR PLAN 1
- acute onset  AMS w/ resolution s/p benzo administration, tongue biting evidence on exam  - AOx3 on admission  - no prior history  - CT negative for etiology eluding to seizure  - MRI shows No potential seizure focus   identified. Chronic lacunar infarct in the right cerebellum.  - EEG performed, results pending  - UTox negative  - EKG shows NSR w/ prolonged QT  - elevated lactate, trend to resolution  - aggressive IVF  - f/u Creatinine kinase  - s/p keppra load, ativan in ED  - PRN Diazepam for seizures  - Keppra 500 mg BID ordered  - Diet NPO, dysphagia screen ordered  - Aspiration, seizure, fall precautions  - Neurology consulted, recs appreciated - acute onset  AMS w/ resolution s/p benzo administration, tongue biting evidence on exam.  - AOx3 on admission  - no prior history  - CT negative for etiology eluding to seizure  - MRI shows No potential seizure focus   identified. Chronic lacunar infarct in the right cerebellum.  - EEG performed, results pending  - UTox negative  - EKG shows NSR w/ prolonged QT  - elevated lactate, trend to resolution  - aggressive IVF  - f/u Creatinine kinase  - s/p keppra load, ativan in ED  - PRN Diazepam for seizures  - Keppra 500 mg BID ordered  - Diet NPO, dysphagia screen ordered  - Aspiration, seizure, fall precautions  - Neurology consulted, recs appreciated - New  onset Seizure   S/P  AMS w/ resolution s/p benzo administration, tongue biting evidence on exam.  - AOx3 on admission  - no prior history  - CT negative for etiology eluding to seizure  - MRI shows No potential seizure focus  identified. Chronic lacunar infarct in the right cerebellum.  - EEG performed, results pending  - UTox negative  - EKG shows NSR w/ prolonged QT  - elevated lactate, trend to resolution  - aggressive IVF  - f/u Creatinine kinase  - s/p keppra load, ativan in ED  - PRN Diazepam for seizures  - Keppra 500 mg BID ordered---> Increase 750 mg q12 hrs as d/w Dr Puckett  - Diet NPO, dysphagia screen ordered  - Aspiration, seizure, fall precautions  - Neurology consulted DR puckett , recs appreciated-D/W - New  onset Seizure   S/P  AMS 2/2 Post ictal state  w/ resolution s/p benzo administration, tongue biting evidence on exam.  - AOx3 on admission  - no prior history  - CT negative for etiology eluding to seizure  - MRI shows No potential seizure focus  identified. Chronic lacunar infarct in the right cerebellum.  - EEG performed, results pending  - UTox negative  - EKG shows NSR w/ prolonged QT  - elevated lactate, trend to resolution  - aggressive IVF  - f/u Creatinine kinase  - s/p keppra load, ativan in ED  - PRN Diazepam for seizures  - Keppra 500 mg BID ordered---> Increase 750 mg q12 hrs as d/w Dr Puckett  - Diet NPO, dysphagia screen ordered  - Aspiration, seizure, fall precautions  - Neurology consulted DR puckett , recs appreciated-D/W

## 2024-04-24 NOTE — ED ADULT TRIAGE NOTE - CHIEF COMPLAINT QUOTE
As per wife patient was unresponsive. Patient is very lethargic now. Altered mental status. C/O Dizziness. Versed 5 mg I/M given. As per wife patient was unresponsive. Patient is very lethargic now. Altered mental status. C/O Dizziness. Versed 5 mg I/M given by EMT.

## 2024-04-24 NOTE — H&P ADULT - NSHPSOCIALHISTORY_GEN_ALL_CORE
Lives at home, employed  Ambulates without assistance  Denies tobacco use  Denies alcohol use  Denies illicit drug use

## 2024-04-24 NOTE — ED ADULT NURSE NOTE - NSFALLRISKINTERV_ED_ALL_ED
Assistance OOB with selected safe patient handling equipment if applicable/Communicate fall risk and risk factors to all staff, patient, and family/Monitor for mental status changes and reorient to person, place, and time, as needed/Move patient closer to nursing station/within visual sight of ED staff/Provide visual cue: yellow wristband, yellow gown, etc/Reinforce activity limits and safety measures with patient and family/Toileting schedule using arm’s reach rule for commode and bathroom/Use of alarms - bed, stretcher, chair and/or video monitoring/Call bell, personal items and telephone in reach/Instruct patient to call for assistance before getting out of bed/chair/stretcher/Non-slip footwear applied when patient is off stretcher/[place] to call system/Physically safe environment - no spills, clutter or unnecessary equipment/Purposeful Proactive Rounding/Room/bathroom lighting operational, light cord in reach

## 2024-04-25 ENCOUNTER — TRANSCRIPTION ENCOUNTER (OUTPATIENT)
Age: 69
End: 2024-04-25

## 2024-04-25 VITALS
TEMPERATURE: 99 F | SYSTOLIC BLOOD PRESSURE: 119 MMHG | OXYGEN SATURATION: 95 % | HEART RATE: 73 BPM | DIASTOLIC BLOOD PRESSURE: 70 MMHG | RESPIRATION RATE: 18 BRPM

## 2024-04-25 DIAGNOSIS — D72.829 ELEVATED WHITE BLOOD CELL COUNT, UNSPECIFIED: ICD-10-CM

## 2024-04-25 LAB
ALBUMIN SERPL ELPH-MCNC: 3.2 G/DL — LOW (ref 3.3–5)
ALP SERPL-CCNC: 47 U/L — SIGNIFICANT CHANGE UP (ref 40–120)
ALT FLD-CCNC: 21 U/L — SIGNIFICANT CHANGE UP (ref 12–78)
ANION GAP SERPL CALC-SCNC: 6 MMOL/L — SIGNIFICANT CHANGE UP (ref 5–17)
AST SERPL-CCNC: 17 U/L — SIGNIFICANT CHANGE UP (ref 15–37)
BASOPHILS # BLD AUTO: 0.05 K/UL — SIGNIFICANT CHANGE UP (ref 0–0.2)
BASOPHILS NFR BLD AUTO: 0.4 % — SIGNIFICANT CHANGE UP (ref 0–2)
BILIRUB SERPL-MCNC: 0.7 MG/DL — SIGNIFICANT CHANGE UP (ref 0.2–1.2)
BUN SERPL-MCNC: 12 MG/DL — SIGNIFICANT CHANGE UP (ref 7–23)
CALCIUM SERPL-MCNC: 8.2 MG/DL — LOW (ref 8.5–10.1)
CHLORIDE SERPL-SCNC: 111 MMOL/L — HIGH (ref 96–108)
CHOLEST SERPL-MCNC: 182 MG/DL — SIGNIFICANT CHANGE UP
CO2 SERPL-SCNC: 27 MMOL/L — SIGNIFICANT CHANGE UP (ref 22–31)
CREAT SERPL-MCNC: 0.74 MG/DL — SIGNIFICANT CHANGE UP (ref 0.5–1.3)
EGFR: 99 ML/MIN/1.73M2 — SIGNIFICANT CHANGE UP
EOSINOPHIL # BLD AUTO: 0.17 K/UL — SIGNIFICANT CHANGE UP (ref 0–0.5)
EOSINOPHIL NFR BLD AUTO: 1.4 % — SIGNIFICANT CHANGE UP (ref 0–6)
GLUCOSE SERPL-MCNC: 103 MG/DL — HIGH (ref 70–99)
GRAM STN FLD: ABNORMAL
HCT VFR BLD CALC: 33.7 % — LOW (ref 39–50)
HDLC SERPL-MCNC: 44 MG/DL — SIGNIFICANT CHANGE UP
HGB BLD-MCNC: 11.4 G/DL — LOW (ref 13–17)
IMM GRANULOCYTES NFR BLD AUTO: 0.5 % — SIGNIFICANT CHANGE UP (ref 0–0.9)
LIPID PNL WITH DIRECT LDL SERPL: 120 MG/DL — HIGH
LYMPHOCYTES # BLD AUTO: 1.32 K/UL — SIGNIFICANT CHANGE UP (ref 1–3.3)
LYMPHOCYTES # BLD AUTO: 10.8 % — LOW (ref 13–44)
MCHC RBC-ENTMCNC: 28.3 PG — SIGNIFICANT CHANGE UP (ref 27–34)
MCHC RBC-ENTMCNC: 33.8 GM/DL — SIGNIFICANT CHANGE UP (ref 32–36)
MCV RBC AUTO: 83.6 FL — SIGNIFICANT CHANGE UP (ref 80–100)
MONOCYTES # BLD AUTO: 0.88 K/UL — SIGNIFICANT CHANGE UP (ref 0–0.9)
MONOCYTES NFR BLD AUTO: 7.2 % — SIGNIFICANT CHANGE UP (ref 2–14)
NEUTROPHILS # BLD AUTO: 9.72 K/UL — HIGH (ref 1.8–7.4)
NEUTROPHILS NFR BLD AUTO: 79.7 % — HIGH (ref 43–77)
NON HDL CHOLESTEROL: 138 MG/DL — HIGH
NRBC # BLD: 0 /100 WBCS — SIGNIFICANT CHANGE UP (ref 0–0)
PLATELET # BLD AUTO: 221 K/UL — SIGNIFICANT CHANGE UP (ref 150–400)
POTASSIUM SERPL-MCNC: 3.2 MMOL/L — LOW (ref 3.5–5.3)
POTASSIUM SERPL-SCNC: 3.2 MMOL/L — LOW (ref 3.5–5.3)
PROT SERPL-MCNC: 5.9 G/DL — LOW (ref 6–8.3)
RBC # BLD: 4.03 M/UL — LOW (ref 4.2–5.8)
RBC # FLD: 12.7 % — SIGNIFICANT CHANGE UP (ref 10.3–14.5)
SODIUM SERPL-SCNC: 144 MMOL/L — SIGNIFICANT CHANGE UP (ref 135–145)
TRIGL SERPL-MCNC: 96 MG/DL — SIGNIFICANT CHANGE UP
WBC # BLD: 12.2 K/UL — HIGH (ref 3.8–10.5)
WBC # FLD AUTO: 12.2 K/UL — HIGH (ref 3.8–10.5)

## 2024-04-25 PROCEDURE — 36415 COLL VENOUS BLD VENIPUNCTURE: CPT

## 2024-04-25 PROCEDURE — 86900 BLOOD TYPING SEROLOGIC ABO: CPT

## 2024-04-25 PROCEDURE — 84484 ASSAY OF TROPONIN QUANT: CPT

## 2024-04-25 PROCEDURE — 87077 CULTURE AEROBIC IDENTIFY: CPT

## 2024-04-25 PROCEDURE — 80307 DRUG TEST PRSMV CHEM ANLYZR: CPT

## 2024-04-25 PROCEDURE — 82550 ASSAY OF CK (CPK): CPT

## 2024-04-25 PROCEDURE — 85610 PROTHROMBIN TIME: CPT

## 2024-04-25 PROCEDURE — 71045 X-RAY EXAM CHEST 1 VIEW: CPT

## 2024-04-25 PROCEDURE — 70450 CT HEAD/BRAIN W/O DYE: CPT | Mod: MC

## 2024-04-25 PROCEDURE — 70496 CT ANGIOGRAPHY HEAD: CPT | Mod: MC

## 2024-04-25 PROCEDURE — 87150 DNA/RNA AMPLIFIED PROBE: CPT

## 2024-04-25 PROCEDURE — 97116 GAIT TRAINING THERAPY: CPT

## 2024-04-25 PROCEDURE — 96374 THER/PROPH/DIAG INJ IV PUSH: CPT

## 2024-04-25 PROCEDURE — 83605 ASSAY OF LACTIC ACID: CPT

## 2024-04-25 PROCEDURE — 93005 ELECTROCARDIOGRAM TRACING: CPT

## 2024-04-25 PROCEDURE — 86850 RBC ANTIBODY SCREEN: CPT

## 2024-04-25 PROCEDURE — 99285 EMERGENCY DEPT VISIT HI MDM: CPT

## 2024-04-25 PROCEDURE — 70498 CT ANGIOGRAPHY NECK: CPT | Mod: MC

## 2024-04-25 PROCEDURE — 85730 THROMBOPLASTIN TIME PARTIAL: CPT

## 2024-04-25 PROCEDURE — 80061 LIPID PANEL: CPT

## 2024-04-25 PROCEDURE — 85025 COMPLETE CBC W/AUTO DIFF WBC: CPT

## 2024-04-25 PROCEDURE — 80053 COMPREHEN METABOLIC PANEL: CPT

## 2024-04-25 PROCEDURE — 70551 MRI BRAIN STEM W/O DYE: CPT | Mod: MC

## 2024-04-25 PROCEDURE — 95954 EEG MONITORING/GIVING DRUGS: CPT

## 2024-04-25 PROCEDURE — 97162 PT EVAL MOD COMPLEX 30 MIN: CPT

## 2024-04-25 PROCEDURE — 81003 URINALYSIS AUTO W/O SCOPE: CPT

## 2024-04-25 PROCEDURE — 97112 NEUROMUSCULAR REEDUCATION: CPT

## 2024-04-25 PROCEDURE — 86901 BLOOD TYPING SEROLOGIC RH(D): CPT

## 2024-04-25 PROCEDURE — 87040 BLOOD CULTURE FOR BACTERIA: CPT

## 2024-04-25 PROCEDURE — 97110 THERAPEUTIC EXERCISES: CPT

## 2024-04-25 RX ORDER — TAMSULOSIN HYDROCHLORIDE 0.4 MG/1
1 CAPSULE ORAL
Qty: 0 | Refills: 0 | DISCHARGE

## 2024-04-25 RX ORDER — OMEPRAZOLE 10 MG/1
1 CAPSULE, DELAYED RELEASE ORAL
Refills: 0 | DISCHARGE

## 2024-04-25 RX ORDER — LEVETIRACETAM 250 MG/1
1 TABLET, FILM COATED ORAL
Qty: 60 | Refills: 0
Start: 2024-04-25 | End: 2024-05-24

## 2024-04-25 RX ORDER — ASPIRIN/CALCIUM CARB/MAGNESIUM 324 MG
1 TABLET ORAL
Qty: 30 | Refills: 0
Start: 2024-04-25 | End: 2024-05-24

## 2024-04-25 RX ORDER — SODIUM CHLORIDE 0.65 %
1 AEROSOL, SPRAY (ML) NASAL THREE TIMES A DAY
Refills: 0 | Status: DISCONTINUED | OUTPATIENT
Start: 2024-04-25 | End: 2024-04-25

## 2024-04-25 RX ORDER — ATORVASTATIN CALCIUM 80 MG/1
1 TABLET, FILM COATED ORAL
Qty: 30 | Refills: 0
Start: 2024-04-25 | End: 2024-05-24

## 2024-04-25 RX ORDER — POTASSIUM CHLORIDE 20 MEQ
40 PACKET (EA) ORAL EVERY 4 HOURS
Refills: 0 | Status: COMPLETED | OUTPATIENT
Start: 2024-04-25 | End: 2024-04-25

## 2024-04-25 RX ADMIN — Medication 1 SPRAY(S): at 10:20

## 2024-04-25 RX ADMIN — LEVETIRACETAM 750 MILLIGRAM(S): 250 TABLET, FILM COATED ORAL at 05:13

## 2024-04-25 RX ADMIN — Medication 40 MILLIEQUIVALENT(S): at 13:41

## 2024-04-25 RX ADMIN — DIPHENHYDRAMINE HYDROCHLORIDE AND LIDOCAINE HYDROCHLORIDE AND ALUMINUM HYDROXIDE AND MAGNESIUM HYDRO 5 MILLILITER(S): KIT at 13:41

## 2024-04-25 RX ADMIN — Medication 81 MILLIGRAM(S): at 13:41

## 2024-04-25 RX ADMIN — SODIUM CHLORIDE 125 MILLILITER(S): 9 INJECTION INTRAMUSCULAR; INTRAVENOUS; SUBCUTANEOUS at 05:13

## 2024-04-25 RX ADMIN — DIPHENHYDRAMINE HYDROCHLORIDE AND LIDOCAINE HYDROCHLORIDE AND ALUMINUM HYDROXIDE AND MAGNESIUM HYDRO 5 MILLILITER(S): KIT at 05:13

## 2024-04-25 RX ADMIN — Medication 40 MILLIEQUIVALENT(S): at 09:47

## 2024-04-25 RX ADMIN — PANTOPRAZOLE SODIUM 40 MILLIGRAM(S): 20 TABLET, DELAYED RELEASE ORAL at 05:13

## 2024-04-25 NOTE — PROGRESS NOTE ADULT - PROBLEM SELECTOR PLAN 1
- New  onset Seizure   S/P  AMS w/ resolution s/p benzo administration, tongue biting evidence on exam.  - AOx3 on admission  - no prior history  - CT negative for etiology eluding to seizure  - MRI shows No potential seizure focus  identified. Chronic lacunar infarct in the right cerebellum.  - EEG performed, neither refutes nor supports a diagnosis of epilepsy.  - UTox negative  - EKG shows NSR w/ prolonged QT  - elevated lactate, resolved  - d/c  IVF  - Ck wnl  - s/p keppra load, ativan in ED  - PRN Diazepam for seizures  - Keppra 500 mg BID ordered---> Increase 750 mg q12 hrs as d/w Dr Puckett  - DASH diet, dysphagia screen passed  - Aspiration, seizure, fall precautions  - Neurology consulted DR puckett , recs appreciated-D/W - New  onset Seizure   S/P  AMS w/ resolution s/p benzo administration, tongue biting evidence on exam.  - CT negative for etiology eluding to seizure  - MRI shows No potential seizure focus  identified. Chronic lacunar infarct in the right cerebellum.  - EEG performed, neither refutes nor supports a diagnosis of epilepsy.  - UTox negative  - EKG shows NSR w/ prolonged QT  - elevated lactate, resolved  - d/c  IVF  - Ck wnl  - s/p keppra load, ativan in ED  - PRN Diazepam for seizures  - Keppra 500 mg BID ordered---> Increase 750 mg q12 hrs as d/w Dr Puckett  - DASH diet, dysphagia screen passed  - Aspiration, seizure, fall precautions  - Neurology  DR puckett D/W stable for d/c , Out pt Holter monitor with Cardiology  d/w pt, wife & son

## 2024-04-25 NOTE — DISCHARGE NOTE PROVIDER - CARE PROVIDERS DIRECT ADDRESSES
,[email or web address],DirectAddress_Unknown ,DirectAddress_Unknown

## 2024-04-25 NOTE — CARE COORDINATION ASSESSMENT. - NSPASTMEDSURGHISTORY_GEN_ALL_CORE_FT
PAST MEDICAL & SURGICAL HISTORY:  Benign prostatic hyperplasia      Moderate mitral regurgitation      MVP (mitral valve prolapse)      Melanoma      GERD (gastroesophageal reflux disease)      Status post dilation of esophageal narrowing      S/P lumbar laminectomy      S/P hernia repair      History of BPH      GERD (gastroesophageal reflux disease)

## 2024-04-25 NOTE — DISCHARGE NOTE PROVIDER - NSDCCPCAREPLAN_GEN_ALL_CORE_FT
PRINCIPAL DISCHARGE DIAGNOSIS  Diagnosis: New onset seizure  Assessment and Plan of Treatment: You were admitted for a new onset seizure.  You were admitted to the hospital for management of this condition.  You were treated approrpiately with seizure abortive therapy and were put on seizure prophylaxis medication.  You will need to take Keppra 750 mg twice daily in order to lower the risk of future seizures from occuring.  You will need to follow up with a neurologist within two weeks of discharge for further management of this condition.  You should not operate heavy machinery or drive for at least 6 months, or if cleared by a neurologist.  You will need to have a Holter monitor, a tool used to check for arrythmias, placed for a select amount of time as per your cardiologist.  Please schedule an appointment with your cardiologist in order to have this test performed.      SECONDARY DISCHARGE DIAGNOSES  Diagnosis: CVA (cerebrovascular accident)  Assessment and Plan of Treatment: You have imaging findings that show evidence for a past infarct located in the lacunar region of your brain.  Neurology was consulted for your care for better management while in the hospital.  You did not have any acute findings on imaging or physical exam for this condition.  You were started on aspirin, an antiplatelet medication, and atorvastatin, a lipid lowering medication.  Please take these medications as prescribed in order to lower the risk of future strokes from occuring.  You will need to follow up with your outpatient provider in regards to this condition.     PRINCIPAL DISCHARGE DIAGNOSIS  Diagnosis: New onset seizure  Assessment and Plan of Treatment: You were admitted for a new onset seizure.    -.  You were treated  with  Anti seizure  therapy and were put on  Medications for treatments for seizure .   - Continue  Keppra 750 mg  1 tab twice daily   DO NOT STOP Keppra.  -Eat Soft Diet as you have tongue bite from Seizure , Use Paroxide mouth wash 2x day as needed   - You will need to follow up with a neurologist within two weeks of discharge for further management of this condition.   - You should not operate heavy machinery & You Can NOT  drive for at least 6 months, & will need to be cleared by a neurologist for Driving .   - You will need to have a- cardiac  Holter monito x 2 weeks to check for arrythmias,  as per your cardiologist  -.  Please schedule an appointment with your cardiologist in order to have this test performed.      SECONDARY DISCHARGE DIAGNOSES  Diagnosis: CVA (cerebrovascular accident)  Assessment and Plan of Treatment: MRI Brain done    -Chronic lacunar infarct in the right cerebellum.  -found to have a past infarct located in the lacunar infarct in  your brain.    Neurology DR Puckett was consulted   -.  You did not have any acute findings on imaging   -.  You were started on EC aspirin 81 mg daily with take with food , an antiplatelet medication, and atorvastatin daily at bed time  , a lipid lowering medication. as High LDL     -Please take these medications as prescribed in order to lower the risk of future strokes from occuring.   - You will need to follow up with your outpatient provider in regards to this condition.    Diagnosis: GERD (gastroesophageal reflux disease)  Assessment and Plan of Treatment: On PPI daily    Diagnosis: History of BPH  Assessment and Plan of Treatment: Continue Home Meds

## 2024-04-25 NOTE — PROGRESS NOTE ADULT - ASSESSMENT
68-year-old male with history of GERD, BPH presenting with altered mental status, admitted for  New onset of seizure.

## 2024-04-25 NOTE — DISCHARGE NOTE NURSING/CASE MANAGEMENT/SOCIAL WORK - NSDCPEFALRISK_GEN_ALL_CORE
For information on Fall & Injury Prevention, visit: [email or web address] OR  [email or web address] OR  https://www.cdc.gov/steadi/patient.html

## 2024-04-25 NOTE — PROGRESS NOTE ADULT - SUBJECTIVE AND OBJECTIVE BOX
Neurology Follow up note    GABE BLOOD68yMale    HPI:  68-year-old male with history of GERD, BPH presenting with altered mental status.  Patient was alert and orientated x 3 during visit, accompanied by family at bedside.  Patient states he was up walking his dogs in the morning walking his dogs.  Patient went to bed shortly after and the next thing he remembers is being restrained by police officers.  Patient states he has not history of seizures.  Patient endorses tongue biting.  Patient states he had a short spell of dizziness yesterday while watching TV that resolved shortly.  Wife states she heard a yell during the morning and saw her  passed out.  Wife states that the patient was not convulsing.  Patient denies fevers, sick contacts, recent travel, shortness of breath, recent travel.      In the ED pts VS were T(C): 36.8  T(F): 98.2  HR: 78  BP: 150/80  RR: 18  SpO2: 97%  Labs show: H.2  WBC: 9.68 Plt:  267 Creatinine: 1.00  CT scans shows  CT HEAD:  1.  No evidence of acute intracranial hemorrhage or midline shift.  2.  Chronic ischemic changes. If there is continued concern for acute   neurologic compromise, recommend MRI of the brain for further evaluation.    CTA BRAIN:  1.  No evidence of intracranial aneurysm, critical stenosis, or abrupt   cut off of intraluminal contrast.    CTA NECK:  1.  No evidence of critical stenosis by NASCET criteria.  MR shows   IMPRESSION:  No acute infarct, hemorrhage, or mass lesion. No potential seizure focus   identified.  Chronic lacunar infarct in the right cerebellum.    EKG shows NSR w/ prolonged QT  s/p keppra, lorazepam in ED     Pt is admitted for further workup and management   (2024 13:41)      Interval History -no new events    Patient is seen, chart was reviewed and case was discussed with the treatment team.  Pt is not in any distress.   Lying on bed comfortably.    No clinical seizure was reported.  .    Vital Signs Last 24 Hrs  T(C): 37.2 (2024 15:41), Max: 37.2 (2024 15:41)  T(F): 99 (2024 15:41), Max: 99 (2024 15:41)  HR: 73 (2024 15:41) (71 - 85)  BP: 119/70 (2024 15:41) (119/70 - 136/79)  BP(mean): --  RR: 18 (2024 15:41) (18 - 18)  SpO2: 95% (2024 15:41) (93% - 96%)    Parameters below as of 2024 15:41  Patient On (Oxygen Delivery Method): room air            REVIEW OF SYSTEMS:    Constitutional: No fever, weight loss or fatigue  Eyes: No eye pain, visual disturbances, or discharge  ENT:  No difficulty hearing, tinnitus, vertigo; No sinus or throat pain  Neck: No pain or stiffness  Respiratory: No cough, wheezing, chills or hemoptysis  Cardiovascular: No chest pain, palpitations, shortness of breath, dizziness or leg swelling  Gastrointestinal: No abdominal or epigastric pain. No nausea, vomiting or hematemesis; No diarrhea or constipati  Genitourinary: No dysuria, frequency, hematuria or incontinence  Neurological: No headaches, memory loss, loss of strength, numbness or tremors  Psychiatric: No depression, anxiety, mood swings or difficulty sleeping  Musculoskeletal: No joint pain or swelling; No muscle, back or extremity pain  Skin: No itching, burning, rashes or lesions   Lymph Nodes: No enlarged glands  Endocrine: No heat or cold intolerance; No hair loss,  Allergy and Immunologic: No hives or eczema    On Neurological Examination:    Mental Status - Pt is alert, awake, oriented X3. Higher functions are intac Follows commands well and able to answer questions appropriately.Mood and affect  normal    Speech -  Normal.     Cranial Nerves - Pupils 3 mm equal and reactive to light, extraocular eye movements intact. Pt has no visual field deficit.  Pt has no  facial asymmetry. Facial sensation is intact.Tongue - is in midline.    Muscle tone - is normal all over. Moves all extremities equally. No asymmetry is seen.      Motor Exam - 5/5 all over, No drift. No shaking or tremors.    Sensory Exam - Pin prick, temperature, joint position and vibration are intact on either side. Pt withdraws all extremities equally on stimulation. No asymmetry seen. No complaints of tingling, numbness.    Gait - Able to stand and walk unassisted.      H    coordination:    Finger to nose: normal    Heel to shin: normal    Deep tendon Reflexes - 2 plus all over.        Romberg - Negative.    Neck Supple -  Yes.     MEDICATIONS    acetaminophen     Tablet .. 650 milliGRAM(s) Oral every 6 hours PRN  aluminum hydroxide/magnesium hydroxide/simethicone Suspension 30 milliLiter(s) Oral every 4 hours PRN  aspirin  chewable 81 milliGRAM(s) Oral daily  atorvastatin 40 milliGRAM(s) Oral at bedtime  diazepam  Injectable 5 milliGRAM(s) IV Push every 10 minutes PRN  enoxaparin Injectable 40 milliGRAM(s) SubCutaneous every 24 hours  FIRST- Mouthwash  BLM 5 milliLiter(s) Swish and Swallow four times a day  guaiFENesin  milliGRAM(s) Oral every 12 hours PRN  levETIRAcetam 750 milliGRAM(s) Oral two times a day  melatonin 3 milliGRAM(s) Oral at bedtime PRN  pantoprazole    Tablet 40 milliGRAM(s) Oral before breakfast  sodium chloride 0.65% Nasal 1 Spray(s) Both Nostrils three times a day PRN  tamsulosin 0.8 milliGRAM(s) Oral at bedtime      Allergies    penicillin (Rash)  penicillin (Hives)    Intolerances        LABS:  CBC Full  -  ( 2024 14:45 )  WBC Count : 12.20 K/uL  RBC Count : 4.03 M/uL  Hemoglobin : 11.4 g/dL  Hematocrit : 33.7 %  Platelet Count - Automated : 221 K/uL  Mean Cell Volume : 83.6 fl  Mean Cell Hemoglobin : 28.3 pg  Mean Cell Hemoglobin Concentration : 33.8 gm/dL  Auto Neutrophil # : 9.72 K/uL  Auto Lymphocyte # : 1.32 K/uL  Auto Monocyte # : 0.88 K/uL  Auto Eosinophil # : 0.17 K/uL  Auto Basophil # : 0.05 K/uL  Auto Neutrophil % : 79.7 %  Auto Lymphocyte % : 10.8 %  Auto Monocyte % : 7.2 %  Auto Eosinophil % : 1.4 %  Auto Basophil % : 0.4 %    Urinalysis Basic - ( 2024 05:40 )    Color: x / Appearance: x / SG: x / pH: x  Gluc: 103 mg/dL / Ketone: x  / Bili: x / Urobili: x   Blood: x / Protein: x / Nitrite: x   Leuk Esterase: x / RBC: x / WBC x   Sq Epi: x / Non Sq Epi: x / Bacteria: x          144  |  111<H>  |  12  ----------------------------<  103<H>  3.2<L>   |  27  |  0.74    Ca    8.2<L>      2024 05:40    TPro  5.9<L>  /  Alb  3.2<L>  /  TBili  0.7  /  DBili  x   /  AST  17  /  ALT  21  /  AlkPhos  47  -    Hemoglobin A1C:   Lipid Panel  @ 05:40  Total Cholesterol, Serum 182  LDL --  Triglycerides 96    Vitamin B12     RADIOLOGY    ASSESSMENT AND PLAN:      new onset GTC SEIZURE    dc home  continue keppra 750 mg bid  no driving for 6 months  swimming only under supervision   management dw dr bermudez  Pain is accessed and addressed.  Plan of care was discussed with family. Questions answered

## 2024-04-25 NOTE — DISCHARGE NOTE PROVIDER - CARE PROVIDER_API CALL
Radha Galvan  Cardiology  [address] [place] [address]  Phone: [phone]  Fax: [phone]  Follow Up Time: 2 weeks    Aly Puckett  Neurology  [address] [place] [address]  Phone: [phone]  Fax: [phone]  Follow Up Time: 2 weeks   Aly Puckett  Neurology  [address] [place] [address]  Phone: [phone]  Fax: [phone]  Follow Up Time: 2 weeks

## 2024-04-25 NOTE — CASE MANAGEMENT PROGRESS NOTE - NSCMPROGRESSNOTE_GEN_ALL_CORE
Per MD pt is stable for transition home today. Declined home care services. Discharge Notice reviewed, copy given to patient. Patient is in agreement with transitioning home today. Spouse at bedside and will transport patient home.

## 2024-04-25 NOTE — DISCHARGE NOTE PROVIDER - NSDCMRMEDTOKEN_GEN_ALL_CORE_FT
alfuzosin 10 mg oral tablet, extended release: 1 tab(s) orally once a day  aspirin 81 mg oral tablet, chewable: 1 tab(s) orally once a day  atorvastatin 40 mg oral tablet: 1 tab(s) orally once a day (at bedtime)  levETIRAcetam 750 mg oral tablet: 1 tab(s) orally 2 times a day  Multiple Vitamins oral tablet: 1 tab(s) orally once a day  omeprazole 20 mg oral delayed release capsule: 1 cap(s) orally once a day

## 2024-04-25 NOTE — CARE COORDINATION ASSESSMENT. - NSCAREPROVIDERS_GEN_ALL_CORE_FT
CARE PROVIDERS:  Accepting Physician: Franco Couch  Administration: Hao Dunn  Administration: Khari Gomez  Admitting: Franco Couch  Attending: Franco Couch  Consultant: Aly Puckett  Consultant: Susy Coats  Covering Team: Aly Puckett  ED Attending: Jose Smith  ED Nurse: Curt Dixon  Nurse: Mylene Jim  Nurse: Ele Velasco  Ordered: ADM, User  Outpatient Provider: Sasha Zimmer  Override: Bridger Bernal  Physical Therapy: Akhil Lees  Primary Team: Nik Lang  Primary Team: Ethan Ocasio  Primary Team: Jackelyn Mclean  Primary Team: Fernando Gillis  Registered Dietitian: Gabby Vang  : Lynette Ortiz

## 2024-04-25 NOTE — DISCHARGE NOTE PROVIDER - NSDCFUADDINST_GEN_ALL_CORE_FT
Follow up with DR Puckett- in 1-2 weeks for Seizure monitoring & Medication adjustment- No Driving   You will need clearance from Neurologist  to drive after 6 Months   Follow up with your Cardiologist for out pt HOLTER Monitor x 2 weeks .

## 2024-04-25 NOTE — DISCHARGE NOTE PROVIDER - NSDCACTIVITY_GEN_ALL_CORE
Do not drive or operate machinery Do not drive or operate machinery/Walking - Indoors allowed/No heavy lifting/straining/Activity as tolerated

## 2024-04-25 NOTE — DISCHARGE NOTE PROVIDER - REASON FOR ADMISSION
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   NAM  Pt with NAM likely pre-renal from GI losses  Scr 1.2 on 4/21/2019-- elevated to 3.9 on 5/52019   Renal function improving with IVF  COntinue IVF   Monitor renal function  Avoid further nephrotoxics, NSAIDS RCA    Hypocalcemia  likely sec to hypoalbunemia  COrrected calcium WNL  Monitor serum Ca    Hyperphosphatemia  likely sec to Renal failure  Serum PO4 now improving   MOnitor serum PO4 seizure

## 2024-04-25 NOTE — PROGRESS NOTE ADULT - PROBLEM SELECTOR PLAN 2
- Chronic lacunar infarct on MRI-As per Pt NOT Aware  - c/w aspirin  - c/w atorvastatin  - lipid profile performed, see results  - Neurology following, recs appreciated Mild leukocytosis  Likely Reactive 2/2 SZ  Afebrile  CBC as out pt

## 2024-04-25 NOTE — PROGRESS NOTE ADULT - SUBJECTIVE AND OBJECTIVE BOX
Patient is a 68y old  Male who presents with a chief complaint of seizure (2024 13:41)    HPI:  68-year-old male with history of GERD, BPH presenting with altered mental status.  Patient was alert and orientated x 3 during visit, accompanied by family at bedside.  Patient states he was up walking his dogs in the morning walking his dogs.  Patient went to bed shortly after and the next thing he remembers is being restrained by police officers.  Patient states he has not history of seizures.  Patient endorses tongue biting.  Patient states he had a short spell of dizziness yesterday while watching TV that resolved shortly.  Wife states she heard a yell during the morning and saw her  passed out.  Wife states that the patient was not convulsing.  Patient denies fevers, sick contacts, recent travel, shortness of breath, recent travel.      In the ED pts VS were T(C): 36.8  T(F): 98.2  HR: 78  BP: 150/80  RR: 18  SpO2: 97%  Labs show: H.2  WBC: 9.68 Plt:  267 Creatinine: 1.00  CT scans shows  CT HEAD:  1.  No evidence of acute intracranial hemorrhage or midline shift.  2.  Chronic ischemic changes. If there is continued concern for acute   neurologic compromise, recommend MRI of the brain for further evaluation.    CTA BRAIN:  1.  No evidence of intracranial aneurysm, critical stenosis, or abrupt   cut off of intraluminal contrast.    CTA NECK:  1.  No evidence of critical stenosis by NASCET criteria.  MR shows   IMPRESSION:  No acute infarct, hemorrhage, or mass lesion. No potential seizure focus   identified.  Chronic lacunar infarct in the right cerebellum.    EKG shows NSR w/ prolonged QT  s/p keppra, lorazepam in ED     Pt is admitted for further workup and management   (2024 13:41)    INTERVAL HPI:  :  Seen at bedside.  States he feels much improved from yesterday.  Requesting cold medicine as he has been recovering from a cold the past week.  Had questions regarding seizure treatment.  Discussed findings on imaging again and medications used to treat conditions, along with duration of treatment.    OVERNIGHT EVENTS:  No reported Seizure activity      Home Medications:  acetaminophen 500 mg oral tablet: 1 tab(s) orally every 6-8  hours x ONE (1) WEEK, then as needed  (12 Mar 2022 11:24)  alfuzosin 10 mg oral tablet, extended release: 1 tab(s) orally once a day (2024 14:04)  Multiple Vitamins oral tablet: 1 tab(s) orally once a day (12 Mar 2022 11:30)  omeprazole 20 mg oral delayed release capsule: 1 cap(s) orally once a day (2024 14:04)  omeprazole 20 mg oral delayed release capsule: 1 cap(s) orally once a day (10 Mar 2022 10:08)  tamsulosin 0.4 mg oral capsule: 1 cap(s) orally once a day (10 Mar 2022 10:08)      MEDICATIONS  (STANDING):  aspirin  chewable 81 milliGRAM(s) Oral daily  atorvastatin 40 milliGRAM(s) Oral at bedtime  enoxaparin Injectable 40 milliGRAM(s) SubCutaneous every 24 hours  FIRST- Mouthwash  BLM 5 milliLiter(s) Swish and Swallow four times a day  levETIRAcetam 750 milliGRAM(s) Oral two times a day  pantoprazole    Tablet 40 milliGRAM(s) Oral before breakfast  potassium chloride    Tablet ER 40 milliEquivalent(s) Oral every 4 hours  sodium chloride 0.9%. 1000 milliLiter(s) (125 mL/Hr) IV Continuous <Continuous>  tamsulosin 0.8 milliGRAM(s) Oral at bedtime    MEDICATIONS  (PRN):  acetaminophen     Tablet .. 650 milliGRAM(s) Oral every 6 hours PRN Temp greater or equal to 38C (100.4F), Mild Pain (1 - 3)  aluminum hydroxide/magnesium hydroxide/simethicone Suspension 30 milliLiter(s) Oral every 4 hours PRN Dyspepsia  diazepam  Injectable 5 milliGRAM(s) IV Push every 10 minutes PRN seizures  guaiFENesin  milliGRAM(s) Oral every 12 hours PRN Cough  melatonin 3 milliGRAM(s) Oral at bedtime PRN Insomnia  sodium chloride 0.65% Nasal 1 Spray(s) Both Nostrils three times a day PRN Nasal Congestion      Allergies    penicillin (Rash)  penicillin (Hives)    Intolerances        Social History:  Lives at home, employed  Ambulates without assistance  Denies tobacco use  Denies alcohol use  Denies illicit drug use (2024 13:41)      REVIEW OF SYSTEMS:  CONSTITUTIONAL: No fever, No chills, No fatigue, No myalgia, No Body ache, No Weakness  EYES: No eye pain,  No visual disturbances, No discharge, NO Redness  ENMT:  No ear pain, No nose bleed, No vertigo; No sinus pain, NO throat pain, No Congestion  NECK: No pain, No stiffness  RESPIRATORY: No cough, NO wheezing, No  hemoptysis, NO  shortness of breath  CARDIOVASCULAR: No chest pain, palpitations  GASTROINTESTINAL: No abdominal pain, NO epigastric pain. No nausea, No vomiting; No diarrhea, No constipation. [  ] BM  GENITOURINARY: No dysuria, No frequency, No urgency, No hematuria, NO incontinence  NEUROLOGICAL: No headaches, No dizziness, No numbness, No tingling, No tremors, No weakness  EXT: No Swelling, No Pain, No Edema  SKIN:  [  x] No itching, burning, rashes, or lesions   MUSCULOSKELETAL: No joint pain ,No Jt swelling; No muscle pain, No back pain, No extremity pain  PSYCHIATRIC: No depression,  No anxiety,  No mood swings ,No difficulty sleeping at night  PAIN SCALE: [ x ] None  [  ] Other-  ROS Unable to obtain due to - [  ] Dementia  [  ] Lethargy [  ] Drowsy [  ] Sedated [  ] non verbal  REST OF REVIEW Of SYSTEM - [ x ] Normal     Vital Signs Last 24 Hrs  T(C): 37 (2024 06:07), Max: 37 (2024 06:07)  T(F): 98.6 (2024 06:07), Max: 98.6 (2024 06:07)  HR: 71 (2024 06:07) (70 - 88)  BP: 129/71 (2024 06:07) (124/76 - 156/86)  BP(mean): --  RR: 18 (2024 06:07) (16 - 18)  SpO2: 93% (2024 06:07) (93% - 98%)    Parameters below as of 2024 06:07  Patient On (Oxygen Delivery Method): room air      Finger Stick        - @ 07:01  -   @ 07:00  --------------------------------------------------------  IN: 1700 mL / OUT: 900 mL / NET: 800 mL     @ 07:01  -   @ 09:59  --------------------------------------------------------  IN: 125 mL / OUT: 0 mL / NET: 125 mL        PHYSICAL EXAM:  GENERAL:  [ x ] NAD , [ x ] well appearing, [  ] Agitated, [  ] Drowsy,  [  ] Lethargy, [  ] confused   HEAD:  [  x] Normal, [  ] Other  EYES:  [x  ] EOMI, [  ] PERRLA, [  ] conjunctiva and sclera clear normal, [  ] Other,  [  ] Pallor,[  ] Discharge  ENMT:  [ x ] Normal, [  ] Moist mucous membranes, [  ] Good dentition, [  ] No Thrush  NECK:  [  ] Supple, [  ] No JVD, [  ] Normal thyroid, [  ] Lymphadenopathy [  ] Other  CHEST/LUNG:  [x  ] Clear to auscultation bilaterally, [  ] Breath Sounds equal B/L / Decrease, [  ] poor effort  [  ] No rales, [  ] No rhonchi  [  ]  No wheezing,   HEART:  [x  ] Regular rate and rhythm, [  ] tachycardia, [  ] Bradycardia,  [  ] irregular  [  ] No murmurs, No rubs, No gallops, [  ] PPM in place (Mfr:  )  ABDOMEN:  [ x ] Soft, [x  ] Nontender, [  ] Nondistended, [  ] No mass, [  ] Bowel sounds present, [  ] obese  NERVOUS SYSTEM:  [x  ] Alert & Oriented X3, [  ] Nonfocal  [  ] Confusion  [  ] Encephalopathic [  ] Sedated [  ] Unable to assess, [  ] Dementia [  x] Other- Lower extremity muscle strength 5/5, improved from yesterday  EXTREMITIES: [  ] 2+ Peripheral Pulses, No clubbing, No cyanosis,  [  ] edema B/L lower EXT. [  ] PVD stasis skin changes B/L Lower EXT, [  ] wound  LYMPH: No lymphadenopathy noted  SKIN:  [x  ] No rashes or lesions, [  ] Pressure Ulcers, [  ] ecchymosis, [  ] Skin Tears, [  ] Other    DIET: Diet, DASH/TLC:   Sodium & Cholesterol Restricted (24 @ 15:58)      LABS:    2024 05:40    144    |  111    |  12     ----------------------------<  103    3.2     |  27     |  0.74     Ca    8.2        2024 05:40    TPro  5.9    /  Alb  3.2    /  TBili  0.7    /  DBili  x      /  AST  17     /  ALT  21     /  AlkPhos  47     2024 05:40    PT/INR - ( 2024 09:30 )   PT: 11.1 sec;   INR: 0.95 ratio         PTT - ( 2024 09:30 )  PTT:27.6 sec  Urinalysis Basic - ( 2024 05:40 )    Color: x / Appearance: x / SG: x / pH: x  Gluc: 103 mg/dL / Ketone: x  / Bili: x / Urobili: x   Blood: x / Protein: x / Nitrite: x   Leuk Esterase: x / RBC: x / WBC x   Sq Epi: x / Non Sq Epi: x / Bacteria: x                CARDIAC MARKERS ( 2024 16:13 )  x     / x     / 226 U/L / x     / x                 Anemia Panel:      Thyroid Panel:                RADIOLOGY & ADDITIONAL TESTS:      HEALTH ISSUES - PROBLEM Dx:  Seizure    Need for prophylactic measure    CVA (cerebrovascular accident)    History of BPH    GERD (gastroesophageal reflux disease)            Consultant(s) Notes Reviewed:  [x  ] YES     Care Discussed with [X] Consultants  [  x] Patient  [  ] Family [  ] HCP [  ]   [  ] Social Service  [  ] RN, [  ] Physical Therapy,[  ] Palliative care team  DVT PPX: [  ] Lovenox, [  ] S C Heparin, [  ] Coumadin, [  ] Xarelto, [  ] Eliquis, [  ] Pradaxa, [  ] IV Heparin drip, [  ] SCD [  ] Contraindication 2 to GI Bleed,[  ] Ambulation [  ] Contraindicated 2 to  bleed [  ] Contraindicated 2 to Brain Bleed  Advanced directive: [  ] None, [  ] DNR/DNI Patient is a 68y old  Male who presents with a chief complaint of seizure (2024 13:41)    HPI:  68-year-old male with history of GERD, BPH presenting with altered mental status.  Patient was alert and orientated x 3 during visit, accompanied by family at bedside.  Patient states he was up walking his dogs in the morning walking his dogs.  Patient went to bed shortly after and the next thing he remembers is being restrained by police officers.  Patient states he has not history of seizures.  Patient endorses tongue biting.  Patient states he had a short spell of dizziness yesterday while watching TV that resolved shortly.  Wife states she heard a yell during the morning and saw her  passed out.  Wife states that the patient was not convulsing.  Patient denies fevers, sick contacts, recent travel, shortness of breath, recent travel.      In the ED pts VS were T(C): 36.8  T(F): 98.2  HR: 78  BP: 150/80  RR: 18  SpO2: 97%  Labs show: H.2  WBC: 9.68 Plt:  267 Creatinine: 1.00  CT scans shows  CT HEAD:  1.  No evidence of acute intracranial hemorrhage or midline shift.  2.  Chronic ischemic changes. If there is continued concern for acute   neurologic compromise, recommend MRI of the brain for further evaluation.    CTA BRAIN:  1.  No evidence of intracranial aneurysm, critical stenosis, or abrupt   cut off of intraluminal contrast.    CTA NECK:  1.  No evidence of critical stenosis by NASCET criteria.  MR shows   IMPRESSION:  No acute infarct, hemorrhage, or mass lesion. No potential seizure focus   identified.  Chronic lacunar infarct in the right cerebellum.    EKG shows NSR w/ prolonged QT  s/p keppra, lorazepam in ED     Pt is admitted for further workup and management   (2024 13:41)    INTERVAL HPI:  :  Seen at bedside.  States he feels much improved from yesterday.  Requesting cold medicine as he has been recovering from a cold the past week.  Had questions regarding seizure treatment.  Discussed findings on imaging again and medications used to treat conditions, along with duration of treatment K Replaced.  D/C Home     OVERNIGHT EVENTS:  No reported Seizure activity      Home Medications:  acetaminophen 500 mg oral tablet: 1 tab(s) orally every 6-8  hours x ONE (1) WEEK, then as needed  (12 Mar 2022 11:24)  alfuzosin 10 mg oral tablet, extended release: 1 tab(s) orally once a day (2024 14:04)  Multiple Vitamins oral tablet: 1 tab(s) orally once a day (12 Mar 2022 11:30)  omeprazole 20 mg oral delayed release capsule: 1 cap(s) orally once a day (2024 14:04)  omeprazole 20 mg oral delayed release capsule: 1 cap(s) orally once a day (10 Mar 2022 10:08)  tamsulosin 0.4 mg oral capsule: 1 cap(s) orally once a day (10 Mar 2022 10:08)      MEDICATIONS  (STANDING):  aspirin  chewable 81 milliGRAM(s) Oral daily  atorvastatin 40 milliGRAM(s) Oral at bedtime  enoxaparin Injectable 40 milliGRAM(s) SubCutaneous every 24 hours  FIRST- Mouthwash  BLM 5 milliLiter(s) Swish and Swallow four times a day  levETIRAcetam 750 milliGRAM(s) Oral two times a day  pantoprazole    Tablet 40 milliGRAM(s) Oral before breakfast  potassium chloride    Tablet ER 40 milliEquivalent(s) Oral every 4 hours  sodium chloride 0.9%. 1000 milliLiter(s) (125 mL/Hr) IV Continuous <Continuous>  tamsulosin 0.8 milliGRAM(s) Oral at bedtime    MEDICATIONS  (PRN):  acetaminophen     Tablet .. 650 milliGRAM(s) Oral every 6 hours PRN Temp greater or equal to 38C (100.4F), Mild Pain (1 - 3)  aluminum hydroxide/magnesium hydroxide/simethicone Suspension 30 milliLiter(s) Oral every 4 hours PRN Dyspepsia  diazepam  Injectable 5 milliGRAM(s) IV Push every 10 minutes PRN seizures  guaiFENesin  milliGRAM(s) Oral every 12 hours PRN Cough  melatonin 3 milliGRAM(s) Oral at bedtime PRN Insomnia  sodium chloride 0.65% Nasal 1 Spray(s) Both Nostrils three times a day PRN Nasal Congestion      Allergies    penicillin (Rash)  penicillin (Hives)    Intolerances        Social History:  Lives at home, employed  Ambulates without assistance  Denies tobacco use  Denies alcohol use  Denies illicit drug use (2024 13:41)      REVIEW OF SYSTEMS: i am OK  CONSTITUTIONAL: No fever, No chills, No fatigue, No myalgia, No Body ache, No Weakness  EYES: No eye pain,  No visual disturbances, No discharge, NO Redness  ENMT:  No ear pain, No nose bleed, No vertigo; No sinus pain, NO throat pain, No Congestion  NECK: No pain, No stiffness  RESPIRATORY: No cough, NO wheezing, No  hemoptysis, NO  shortness of breath  CARDIOVASCULAR: No chest pain, palpitations  GASTROINTESTINAL: No abdominal pain, NO epigastric pain. No nausea, No vomiting; No diarrhea, No constipation. [  ] BM  GENITOURINARY: No dysuria, No frequency, No urgency, No hematuria, NO incontinence  NEUROLOGICAL: No headaches, No dizziness, No numbness, No tingling, No tremors, No weakness  EXT: No Swelling, No Pain, No Edema  SKIN:  [  x] No itching, burning, rashes, or lesions   MUSCULOSKELETAL: No joint pain ,No Jt swelling; No muscle pain, No back pain, No extremity pain  PSYCHIATRIC: No depression,  No anxiety,  No mood swings ,No difficulty sleeping at night  PAIN SCALE: [ x ] None  [  ] Other-  ROS Unable to obtain due to - [  ] Dementia  [  ] Lethargy [  ] Drowsy [  ] Sedated [  ] non verbal  REST OF REVIEW Of SYSTEM - [ x ] Normal     Vital Signs Last 24 Hrs  T(C): 37 (2024 06:07), Max: 37 (2024 06:07)  T(F): 98.6 (2024 06:07), Max: 98.6 (2024 06:07)  HR: 71 (2024 06:07) (70 - 88)  BP: 129/71 (2024 06:07) (124/76 - 156/86)  BP(mean): --  RR: 18 (2024 06:07) (16 - 18)  SpO2: 93% (2024 06:07) (93% - 98%)    Parameters below as of 2024 06:07  Patient On (Oxygen Delivery Method): room air      Finger Stick        - @ 07:01  -   @ 07:00  --------------------------------------------------------  IN: 1700 mL / OUT: 900 mL / NET: 800 mL     @ 07:01  -   @ 09:59  --------------------------------------------------------  IN: 125 mL / OUT: 0 mL / NET: 125 mL        PHYSICAL EXAM:  GENERAL:  [ x ] NAD , [ x ] well appearing, [  ] Agitated, [  ] Drowsy,  [  ] Lethargy, [  ] confused   HEAD:  [  x] Normal, [  ] Other  EYES:  [x  ] EOMI, [ x ] PERRLA, [  ] conjunctiva and sclera clear normal, [  ] Other,  [  ] Pallor,[  ] Discharge  ENMT:  [ x ] Normal, [ x ] Moist mucous membranes, [x  ] Good dentition, [ x ] No Thrush-Tongue injury -improving   NECK:  [ x ] Supple, [ x ] No JVD, [  ] Normal thyroid, [  ] Lymphadenopathy [  ] Other  CHEST/LUNG:  [x  ] Clear to auscultation bilaterally, [ x ] Breath Sounds equal B/L / Decrease, [ x ] poor effort  [x  ] No rales, [ x ] No rhonchi  [ x ]  No wheezing,   HEART:  [x  ] Regular rate and rhythm, [  ] tachycardia, [  ] Bradycardia,  [  ] irregular  [ x ] No murmurs, No rubs, No gallops, [  ] PPM in place (Mfr:  )  ABDOMEN:  [ x ] Soft, [x  ] Nontender, [x  ] Nondistended, [x  ] No mass, [ x ] Bowel sounds present, [  ] obese  NERVOUS SYSTEM:  [x  ] Alert & Oriented X3, [ x ] Nonfocal  [  ] Confusion  [  ] Encephalopathic [  ] Sedated [  ] Unable to assess, [  ] Dementia [  x] Other- Lower extremity muscle strength 5/5, improved from yesterday  EXTREMITIES: [ x ] 2+ Peripheral Pulses, No clubbing, No cyanosis,  [  ] edema B/L lower EXT. [  ] PVD stasis skin changes B/L Lower EXT, [  ] wound  LYMPH: No lymphadenopathy noted  SKIN:  [x  ] No rashes or lesions, [  ] Pressure Ulcers, [  ] ecchymosis, [  ] Skin Tears, [  ] Other    DIET: Diet, DASH/TLC:   Sodium & Cholesterol Restricted (24 @ 15:58)      LABS:    2024 05:40                          11.4   12.20 )-----------( 221      ( 2024 14:45 )             33.7       144    |  111    |  12     ----------------------------<  103    3.2     |  27     |  0.74     Ca    8.2        2024 05:40    TPro  5.9    /  Alb  3.2    /  TBili  0.7    /  DBili  x      /  AST  17     /  ALT  21     /  AlkPhos  47     2024 05:40    PT/INR - ( 2024 09:30 )   PT: 11.1 sec;   INR: 0.95 ratio         PTT - ( 2024 09:30 )  PTT:27.6 sec  Urinalysis Basic - ( 2024 05:40 )    Color: x / Appearance: x / SG: x / pH: x  Gluc: 103 mg/dL / Ketone: x  / Bili: x / Urobili: x   Blood: x / Protein: x / Nitrite: x   Leuk Esterase: x / RBC: x / WBC x   Sq Epi: x / Non Sq Epi: x / Bacteria: x      CARDIAC MARKERS ( 2024 16:13 )  x     / x     / 226 U/L / x     / x            RADIOLOGY & ADDITIONAL TESTS:      HEALTH ISSUES - PROBLEM Dx:  Seizure    Need for prophylactic measure    CVA (cerebrovascular accident)    History of BPH    GERD (gastroesophageal reflux disease)        Consultant(s) Notes Reviewed:  [x  ] YES     Care Discussed with [X] Consultants  [  x] Patient  [ x ] Family [  ] HCP [x  ]   [  ] Social Service  [ x ] RN, [  ] Physical Therapy,[  ] Palliative care team  DVT PPX: [x  ] Lovenox, [  ] S C Heparin, [  ] Coumadin, [  ] Xarelto, [  ] Eliquis, [  ] Pradaxa, [  ] IV Heparin drip, [  ] SCD [  ] Contraindication 2 to GI Bleed,[  ] Ambulation [  ] Contraindicated 2 to  bleed [  ] Contraindicated 2 to Brain Bleed  Advanced directive: [ x ] None, [  ] DNR/DNI

## 2024-04-25 NOTE — PROGRESS NOTE ADULT - ATTENDING COMMENTS
68-year-old male with history of GERD, BPH presenting with altered mental status, admitted for  New onset of seizure.  Pt seen, Examined, case & care plan d/w pt, residents at detail.  Consults-  Neurology-DR Puckett D/W at detail-D/C Home with Keppra 750 mg 2x day,   Pt & Family aware & D/W pt unable to drive x 6 months, Neurology will clear pt for driving.  PT Eval---> NO need   SZ Precautions  Fall Precautions  Aspiration Precautions.  SCD   PO diet   D/C Home today   D/W Family at bedside at detail about out pt follow up & D/C meds  Total  d/c Care time s 60 minutes..

## 2024-04-25 NOTE — DISCHARGE NOTE NURSING/CASE MANAGEMENT/SOCIAL WORK - PATIENT PORTAL LINK FT
You can access the FollowMyHealth Patient Portal offered by [facility] by registering at the following website: [email or web address]. By joining [employer]’s FollowMyHealth portal, you will also be able to view your health information using other applications (apps) compatible with our system.

## 2024-04-25 NOTE — PROGRESS NOTE ADULT - PROBLEM SELECTOR PLAN 3
Chronic  - c/w alfuzosin w/ theurapeutic conversion - Chronic lacunar infarct on MRI-As per Pt NOT Aware  - c/w aspirin EC 81 mg daily  - c/w atorvastatin qHS as elevated LDL  - lipid profile performed, see results  - Neurology following, recs appreciated

## 2024-04-25 NOTE — CARE COORDINATION ASSESSMENT. - OTHER PERTINENT DISCHARGE PLANNING INFORMATION:
CM met with the patient at the bedside, educated pt on role of CM and transition planning. Patient verbalized understanding. CM provided direct contact/resource folder and remains available. Pt resides in a house with his spouse, has no steps to negotiate. Denies any DME or prior home care services.  Per patient he is independent with ADL's and ambulating, denies caregiver.  PMD Dr Filemon Grover [phone], Pharmacy [facility].

## 2024-04-25 NOTE — DISCHARGE NOTE PROVIDER - PROVIDER TOKENS
PROVIDER:[TOKEN:[[id number]],FOLLOWUP:[2 weeks]],PROVIDER:[TOKEN:[[id number]],FOLLOWUP:[2 weeks]] PROVIDER:[TOKEN:[[id number]],FOLLOWUP:[2 weeks]]

## 2024-04-25 NOTE — DISCHARGE NOTE PROVIDER - HOSPITAL COURSE
ADMISSION DATE:  24    ---  FROM ADMISSION H+P:   HPI:  68-year-old male with history of GERD, BPH presenting with altered mental status.  Patient was alert and orientated x 3 during visit, accompanied by family at bedside.  Patient states he was up walking his dogs in the morning walking his dogs.  Patient went to bed shortly after and the next thing he remembers is being restrained by police officers.  Patient states he has not history of seizures.  Patient endorses tongue biting.  Patient states he had a short spell of dizziness yesterday while watching TV that resolved shortly.  Wife states she heard a yell during the morning and saw her  passed out.  Wife states that the patient was not convulsing.  Patient denies fevers, sick contacts, recent travel, shortness of breath, recent travel.      In the ED pts VS were T(C): 36.8  T(F): 98.2  HR: 78  BP: 150/80  RR: 18  SpO2: 97%  Labs show: H.2  WBC: 9.68 Plt:  267 Creatinine: 1.00  CT scans shows  CT HEAD:  1.  No evidence of acute intracranial hemorrhage or midline shift.  2.  Chronic ischemic changes. If there is continued concern for acute   neurologic compromise, recommend MRI of the brain for further evaluation.    CTA BRAIN:  1.  No evidence of intracranial aneurysm, critical stenosis, or abrupt   cut off of intraluminal contrast.    CTA NECK:  1.  No evidence of critical stenosis by NASCET criteria.  MR shows   IMPRESSION:  No acute infarct, hemorrhage, or mass lesion. No potential seizure focus   identified.  Chronic lacunar infarct in the right cerebellum.    EKG shows NSR w/ prolonged QT  s/p keppra, lorazepam in ED     Pt is admitted for further workup and management   (2024 13:41)      ---  HOSPITAL COURSE/PERTINENT LABS/PROCEDURES PERFORMED/PENDING TESTS:  Patient monitored in the hospital.  Found to have chronic lacunar infarct.  Started on aspirin and statin.  Patient lipid profile shows hyperlipidemia.  Seen by neurology.  Transitioned to keppra 750 mg BID after loading dose in ED.  Patient had no further episodes of seizures in hospital.      The patient was seen by physical therapy who recommended no skilled PT needs. The patient was seen and examined on the day of discharge. The patient is medically optimized for discharge to home    ---  PATIENT CONDITION:  - stable    ---  PHYSICAL EXAM ON DAY OF DISCHARGE:  T(C): 36.7 (04-25-24 @ 12:03), Max: 37 (24 @ 06:07)  HR: 85 (24 @ 12:03) (70 - 85)  BP: 123/76 (24 @ 12:03) (123/76 - 136/80)  RR: 18 (24 @ 12:03) (18 - 18)  SpO2: 95% (24 @ 12:03) (93% - 97%)    GENERAL: patient appears well, no acute distress, appropriate, pleasant  EYES: sclera clear, no exudates  ENMT: oropharynx clear without erythema, no exudates, moist mucous membranes  LUNGS: good air entry bilaterally, clear to auscultation, symmetric breath sounds, no wheezing or rhonchi appreciated  HEART: soft S1/S2, regular rate and rhythm, no murmurs noted, no lower extremity edema  GASTROINTESTINAL: abdomen is soft, nontender, nondistended, normoactive bowel sounds  INTEGUMENT: good skin turgor, no lesions noted  MUSCULOSKELETAL: no clubbing or cyanosis, no obvious deformity  NEUROLOGIC: awake, alert, oriented x3, good muscle tone in 4 extremities, no obvious sensory deficits  PSYCHIATRIC: mood is good, affect is congruent, linear and logical thought process  HEME/LYMPH: no palpable supraclavicular nodules, no obvious ecchymosis or petechiae   ---  CONSULTANTS:   Neurology: Dr. Puckett  ---  ADVANCED CARE PLANNING:  - Code status:    Full Code  - MOLST completed:      [ x ] NO     [  ] YES    ---  TIME SPENT:  I, the attending physician, was physically present for the key portions of the evaluation and management (E/M) service provided. The total amount of time spent reviewing the hospital notes, laboratory values, imaging findings, assessing/counseling the patient, discussing with consultant physicians, social work, nursing staff was -- minutes ADMISSION DATE:  24    ---  FROM ADMISSION H+P:   HPI:  68-year-old male with history of GERD, BPH presenting with altered mental status.  Patient was alert and orientated x 3 during visit, accompanied by family at bedside.  Patient states he was up walking his dogs in the morning walking his dogs.  Patient went to bed shortly after and the next thing he remembers is being restrained by police officers.  Patient states he has not history of seizures.  Patient endorses tongue biting.  Patient states he had a short spell of dizziness yesterday while watching TV that resolved shortly.  Wife states she heard a yell during the morning and saw her  passed out.  Wife states that the patient was not convulsing.  Patient denies fevers, sick contacts, recent travel, shortness of breath, recent travel.      In the ED pts VS were T(C): 36.8  T(F): 98.2  HR: 78  BP: 150/80  RR: 18  SpO2: 97%  Labs show: H.2  WBC: 9.68 Plt:  267 Creatinine: 1.00  CT scans shows  CT HEAD:  1.  No evidence of acute intracranial hemorrhage or midline shift.  2.  Chronic ischemic changes. If there is continued concern for acute   neurologic compromise, recommend MRI of the brain for further evaluation.    CTA BRAIN:  1.  No evidence of intracranial aneurysm, critical stenosis, or abrupt   cut off of intraluminal contrast.    CTA NECK:  1.  No evidence of critical stenosis by NASCET criteria.  MR shows   IMPRESSION:  No acute infarct, hemorrhage, or mass lesion. No potential seizure focus   identified.  Chronic lacunar infarct in the right cerebellum.    EKG shows NSR w/ prolonged QT  s/p keppra, lorazepam in ED     Pt is admitted for further workup and management   (2024 13:41)      ---  HOSPITAL COURSE/PERTINENT LABS/PROCEDURES PERFORMED/PENDING TESTS:  Patient monitored in the hospital.  Found to have chronic lacunar infarct.  Started on aspirin and statin.  Patient lipid profile shows hyperlipidemia.  Seen by neurology.  Transitioned to keppra 750 mg BID after loading dose in ED.  Patient had no further episodes of seizures in hospital.  EEG done - prilim read Non conclusive, pt will need 2 weeks out pt Cardiac Holter monitoring r/o arrythmia     The patient was seen by physical therapy who recommended no skilled PT needs. The patient was seen and examined on the day of discharge. The patient is medically optimized for discharge to home    ---  PATIENT CONDITION:  - stable    ---  PHYSICAL EXAM ON DAY OF DISCHARGE:  T(C): 36.7 (24 @ 12:03), Max: 37 (24 @ 06:07)  HR: 85 (24 @ 12:03) (70 - 85)  BP: 123/76 (24 @ 12:03) (123/76 - 136/80)  RR: 18 (24 @ 12:03) (18 - 18)  SpO2: 95% (24 @ 12:03) (93% - 97%)    GENERAL: patient appears well, no acute distress, appropriate, pleasant  EYES: sclera clear, no exudates  ENMT: oropharynx clear without erythema, no exudates, moist mucous membranes  LUNGS: good air entry bilaterally, clear to auscultation, symmetric breath sounds, no wheezing or rhonchi appreciated  HEART: soft S1/S2, regular rate and rhythm, no murmurs noted, no lower extremity edema  GASTROINTESTINAL: abdomen is soft, nontender, nondistended, normoactive bowel sounds  INTEGUMENT: good skin turgor, no lesions noted  MUSCULOSKELETAL: no clubbing or cyanosis, no obvious deformity  NEUROLOGIC: awake, alert, oriented x3, good muscle tone in 4 extremities, no obvious sensory deficits  PSYCHIATRIC: mood is good, affect is congruent, linear and logical thought process  HEME/LYMPH: no palpable supraclavicular nodules, no obvious ecchymosis or petechiae   ---  CONSULTANTS:   Neurology: Dr. Puckett  ---  ADVANCED CARE PLANNING:  - Code status:    Full Code  - MOLST completed:      [ x ] NO     [  ] YES    ---  TIME SPENT:  I, the attending physician, was physically present for the key portions of the evaluation and management (E/M) service provided. The total amount of time spent reviewing the hospital notes, laboratory values, imaging findings, assessing/counseling the patient, discussing with consultant physicians, social work, nursing staff was - 60 minutes ADMISSION DATE:  24    ---  FROM ADMISSION H+P:   HPI:  68-year-old male with history of GERD, BPH presenting with altered mental status.  Patient was alert and orientated x 3 during visit, accompanied by family at bedside.  Patient states he was up walking his dogs in the morning walking his dogs.  Patient went to bed shortly after and the next thing he remembers is being restrained by police officers.  Patient states he has not history of seizures.  Patient endorses tongue biting.  Patient states he had a short spell of dizziness yesterday while watching TV that resolved shortly.  Wife states she heard a yell during the morning and saw her  passed out.  Wife states that the patient was not convulsing.  Patient denies fevers, sick contacts, recent travel, shortness of breath, recent travel.      In the ED pts VS were T(C): 36.8  T(F): 98.2  HR: 78  BP: 150/80  RR: 18  SpO2: 97%  Labs show: H.2  WBC: 9.68 Plt:  267 Creatinine: 1.00  CT scans shows  CT HEAD:  1.  No evidence of acute intracranial hemorrhage or midline shift.  2.  Chronic ischemic changes. If there is continued concern for acute   neurologic compromise, recommend MRI of the brain for further evaluation.    CTA BRAIN:  1.  No evidence of intracranial aneurysm, critical stenosis, or abrupt   cut off of intraluminal contrast.    CTA NECK:  1.  No evidence of critical stenosis by NASCET criteria.  MR shows   IMPRESSION:  No acute infarct, hemorrhage, or mass lesion. No potential seizure focus   identified.  Chronic lacunar infarct in the right cerebellum.    EKG shows NSR w/ prolonged QT  s/p keppra, lorazepam in ED     Pt is admitted for further workup and management   (2024 13:41)      ---  HOSPITAL COURSE/PERTINENT LABS/PROCEDURES PERFORMED/PENDING TESTS: Tox screen neg , Pt had CT Brain & MRI Brain done.  Patient monitored in the hospital.  Found to have chronic lacunar infarct.  Started on aspirin and statin.  Patient lipid profile shows hyperlipidemia.  Seen by neurology.  Transitioned to keppra 750 mg BID after loading dose in ED.  Patient had no further episodes of seizures in hospital.  EEG done - prilim read Non conclusive, pt will need 2 weeks out pt Cardiac Holter monitoring r/o arrythmia     The patient was seen by physical therapy who recommended no skilled PT needs. The patient was seen and examined on the day of discharge. The patient is medically optimized for discharge to home    ---  PATIENT CONDITION:  - stable    ---  PHYSICAL EXAM ON DAY OF DISCHARGE:  T(C): 36.7 (24 @ 12:03), Max: 37 (24 @ 06:07)  HR: 85 (24 @ 12:03) (70 - 85)  BP: 123/76 (24 @ 12:03) (123/76 - 136/80)  RR: 18 (24 @ 12:03) (18 - 18)  SpO2: 95% (24 @ 12:03) (93% - 97%)    GENERAL: patient appears well, no acute distress, appropriate, pleasant  EYES: sclera clear, no exudates  ENMT: oropharynx clear without erythema, no exudates, moist mucous membranes  LUNGS: good air entry bilaterally, clear to auscultation, symmetric breath sounds, no wheezing or rhonchi appreciated  HEART: soft S1/S2, regular rate and rhythm, no murmurs noted, no lower extremity edema  GASTROINTESTINAL: abdomen is soft, nontender, nondistended, normoactive bowel sounds  INTEGUMENT: good skin turgor, no lesions noted  MUSCULOSKELETAL: no clubbing or cyanosis, no obvious deformity  NEUROLOGIC: awake, alert, oriented x3, good muscle tone in 4 extremities, no obvious sensory deficits  PSYCHIATRIC: mood is good, affect is congruent, linear and logical thought process  HEME/LYMPH: no palpable supraclavicular nodules, no obvious ecchymosis or petechiae   ---  CONSULTANTS:   Neurology: Dr. Puckett  ---  ADVANCED CARE PLANNING:  - Code status:    Full Code  - MOLST completed:      [ x ] NO     [  ] YES    ---  TIME SPENT:  I, the attending physician, was physically present for the key portions of the evaluation and management (E/M) service provided. The total amount of time spent reviewing the hospital notes, laboratory values, imaging findings, assessing/counseling the patient, discussing with consultant physicians, social work, nursing staff was - 60 minutes

## 2024-04-25 NOTE — PROGRESS NOTE ADULT - PROBLEM SELECTOR PLAN 4
Chronic  - c/w home omeprazole w/ theurpaeutic conversion Chronic  - c/w alfuzosin w/ therapeutic conversion

## 2024-04-26 ENCOUNTER — INPATIENT (INPATIENT)
Facility: HOSPITAL | Age: 69
LOS: 2 days | Discharge: HOME | DRG: 872 | End: 2024-04-29
Attending: INTERNAL MEDICINE | Admitting: INTERNAL MEDICINE
Payer: MEDICARE

## 2024-04-26 VITALS
DIASTOLIC BLOOD PRESSURE: 68 MMHG | TEMPERATURE: 101 F | HEART RATE: 112 BPM | OXYGEN SATURATION: 94 % | RESPIRATION RATE: 18 BRPM | SYSTOLIC BLOOD PRESSURE: 114 MMHG

## 2024-04-26 DIAGNOSIS — Z98.890 OTHER SPECIFIED POSTPROCEDURAL STATES: Chronic | ICD-10-CM

## 2024-04-26 LAB
-  STREPTOCOCCUS SP. (NOT GRP A, B OR S PNEUMONIAE): SIGNIFICANT CHANGE UP
ALBUMIN SERPL ELPH-MCNC: 3.4 G/DL — SIGNIFICANT CHANGE UP (ref 3.3–5)
ALP SERPL-CCNC: 62 U/L — SIGNIFICANT CHANGE UP (ref 40–120)
ALT FLD-CCNC: 24 U/L — SIGNIFICANT CHANGE UP (ref 12–78)
ANION GAP SERPL CALC-SCNC: 6 MMOL/L — SIGNIFICANT CHANGE UP (ref 5–17)
APTT BLD: 26.3 SEC — SIGNIFICANT CHANGE UP (ref 24.5–35.6)
AST SERPL-CCNC: 28 U/L — SIGNIFICANT CHANGE UP (ref 15–37)
BASOPHILS # BLD AUTO: 0.02 K/UL — SIGNIFICANT CHANGE UP (ref 0–0.2)
BASOPHILS NFR BLD AUTO: 0.1 % — SIGNIFICANT CHANGE UP (ref 0–2)
BILIRUB SERPL-MCNC: 1.5 MG/DL — HIGH (ref 0.2–1.2)
BUN SERPL-MCNC: 21 MG/DL — SIGNIFICANT CHANGE UP (ref 7–23)
CALCIUM SERPL-MCNC: 8.4 MG/DL — LOW (ref 8.5–10.1)
CHLORIDE SERPL-SCNC: 108 MMOL/L — SIGNIFICANT CHANGE UP (ref 96–108)
CO2 SERPL-SCNC: 27 MMOL/L — SIGNIFICANT CHANGE UP (ref 22–31)
CREAT SERPL-MCNC: 1.1 MG/DL — SIGNIFICANT CHANGE UP (ref 0.5–1.3)
CULTURE RESULTS: ABNORMAL
EGFR: 74 ML/MIN/1.73M2 — SIGNIFICANT CHANGE UP
EOSINOPHIL # BLD AUTO: 0.03 K/UL — SIGNIFICANT CHANGE UP (ref 0–0.5)
EOSINOPHIL NFR BLD AUTO: 0.2 % — SIGNIFICANT CHANGE UP (ref 0–6)
FLUAV AG NPH QL: SIGNIFICANT CHANGE UP
FLUBV AG NPH QL: SIGNIFICANT CHANGE UP
GLUCOSE SERPL-MCNC: 144 MG/DL — HIGH (ref 70–99)
HCT VFR BLD CALC: 34.3 % — LOW (ref 39–50)
HGB BLD-MCNC: 11.7 G/DL — LOW (ref 13–17)
IMM GRANULOCYTES NFR BLD AUTO: 0.9 % — SIGNIFICANT CHANGE UP (ref 0–0.9)
INR BLD: 1.14 RATIO — SIGNIFICANT CHANGE UP (ref 0.85–1.18)
LACTATE SERPL-SCNC: 1.2 MMOL/L — SIGNIFICANT CHANGE UP (ref 0.7–2)
LYMPHOCYTES # BLD AUTO: 0.48 K/UL — LOW (ref 1–3.3)
LYMPHOCYTES # BLD AUTO: 3.5 % — LOW (ref 13–44)
MANUAL SMEAR VERIFICATION: SIGNIFICANT CHANGE UP
MCHC RBC-ENTMCNC: 28.5 PG — SIGNIFICANT CHANGE UP (ref 27–34)
MCHC RBC-ENTMCNC: 34.1 GM/DL — SIGNIFICANT CHANGE UP (ref 32–36)
MCV RBC AUTO: 83.5 FL — SIGNIFICANT CHANGE UP (ref 80–100)
METHOD TYPE: SIGNIFICANT CHANGE UP
MONOCYTES # BLD AUTO: 0.83 K/UL — SIGNIFICANT CHANGE UP (ref 0–0.9)
MONOCYTES NFR BLD AUTO: 6.1 % — SIGNIFICANT CHANGE UP (ref 2–14)
NEUTROPHILS # BLD AUTO: 12.1 K/UL — HIGH (ref 1.8–7.4)
NEUTROPHILS NFR BLD AUTO: 89.2 % — HIGH (ref 43–77)
NRBC # BLD: 0 /100 WBCS — SIGNIFICANT CHANGE UP (ref 0–0)
ORGANISM # SPEC MICROSCOPIC CNT: ABNORMAL
ORGANISM # SPEC MICROSCOPIC CNT: SIGNIFICANT CHANGE UP
PLAT MORPH BLD: NORMAL — SIGNIFICANT CHANGE UP
PLATELET # BLD AUTO: 171 K/UL — SIGNIFICANT CHANGE UP (ref 150–400)
POTASSIUM SERPL-MCNC: 3.5 MMOL/L — SIGNIFICANT CHANGE UP (ref 3.5–5.3)
POTASSIUM SERPL-SCNC: 3.5 MMOL/L — SIGNIFICANT CHANGE UP (ref 3.5–5.3)
PROCALCITONIN SERPL-MCNC: 0.5 NG/ML — HIGH
PROT SERPL-MCNC: 6.6 G/DL — SIGNIFICANT CHANGE UP (ref 6–8.3)
PROTHROM AB SERPL-ACNC: 13.3 SEC — HIGH (ref 9.5–13)
RBC # BLD: 4.11 M/UL — LOW (ref 4.2–5.8)
RBC # FLD: 12.8 % — SIGNIFICANT CHANGE UP (ref 10.3–14.5)
RBC BLD AUTO: NORMAL — SIGNIFICANT CHANGE UP
RSV RNA NPH QL NAA+NON-PROBE: SIGNIFICANT CHANGE UP
SARS-COV-2 RNA SPEC QL NAA+PROBE: SIGNIFICANT CHANGE UP
SODIUM SERPL-SCNC: 141 MMOL/L — SIGNIFICANT CHANGE UP (ref 135–145)
SPECIMEN SOURCE: SIGNIFICANT CHANGE UP
WBC # BLD: 13.58 K/UL — HIGH (ref 3.8–10.5)
WBC # FLD AUTO: 13.58 K/UL — HIGH (ref 3.8–10.5)

## 2024-04-26 PROCEDURE — 99285 EMERGENCY DEPT VISIT HI MDM: CPT

## 2024-04-26 PROCEDURE — 71045 X-RAY EXAM CHEST 1 VIEW: CPT | Mod: 26

## 2024-04-26 PROCEDURE — 93010 ELECTROCARDIOGRAM REPORT: CPT

## 2024-04-26 RX ORDER — SODIUM CHLORIDE 9 MG/ML
2000 INJECTION INTRAMUSCULAR; INTRAVENOUS; SUBCUTANEOUS ONCE
Refills: 0 | Status: COMPLETED | OUTPATIENT
Start: 2024-04-26 | End: 2024-04-26

## 2024-04-26 RX ORDER — CIPROFLOXACIN LACTATE 400MG/40ML
VIAL (ML) INTRAVENOUS
Refills: 0 | Status: DISCONTINUED | OUTPATIENT
Start: 2024-04-26 | End: 2024-04-26

## 2024-04-26 RX ORDER — ACETAMINOPHEN 500 MG
1000 TABLET ORAL ONCE
Refills: 0 | Status: COMPLETED | OUTPATIENT
Start: 2024-04-26 | End: 2024-04-26

## 2024-04-26 RX ORDER — CEFTRIAXONE 500 MG/1
2000 INJECTION, POWDER, FOR SOLUTION INTRAMUSCULAR; INTRAVENOUS ONCE
Refills: 0 | Status: COMPLETED | OUTPATIENT
Start: 2024-04-26 | End: 2024-04-26

## 2024-04-26 RX ORDER — CEFTRIAXONE 500 MG/1
1000 INJECTION, POWDER, FOR SOLUTION INTRAMUSCULAR; INTRAVENOUS ONCE
Refills: 0 | Status: DISCONTINUED | OUTPATIENT
Start: 2024-04-26 | End: 2024-04-26

## 2024-04-26 RX ADMIN — SODIUM CHLORIDE 2000 MILLILITER(S): 9 INJECTION INTRAMUSCULAR; INTRAVENOUS; SUBCUTANEOUS at 22:42

## 2024-04-26 RX ADMIN — Medication 400 MILLIGRAM(S): at 22:42

## 2024-04-26 RX ADMIN — CEFTRIAXONE 100 MILLIGRAM(S): 500 INJECTION, POWDER, FOR SOLUTION INTRAMUSCULAR; INTRAVENOUS at 22:48

## 2024-04-26 NOTE — ED PROVIDER NOTE - PROGRESS NOTE DETAILS
dw pharmacist about meds for sepsis, pt has been given cephalosporin previously and tolerated will change cipro to rocephin DW DR Franco bermudez re-admit for sepsis she was agreeable and accepted Reevaluated patient at bedside.  Patient feeling much improved.  Discussed the results of all diagnostic testing in ED and copies of all reports given.   An opportunity to ask questions was given.  Discussed the importance of prompt, close medical follow-up.  Patient will return with any changes, concerns or persistent / worsening symptoms.  Understanding of all instructions verbalized.

## 2024-04-26 NOTE — ED PROVIDER NOTE - OBJECTIVE STATEMENT
pmd dr Filemon foster  Patient is a 68-year-old male who was just discharged from this facility after he was diagnosed with new onset seizures.  He was started on Keppra.  Seen by Dr. Puckett neurology.  He was home when he started feeling unwell with rigors shaking chills and a fever to 102.8 as a Tmax.  His family members who are healthcare providers gave him Advil and Tylenol last dose of medication was Advil 800 mg at 10 PM, Tylenol 1 g at 6 PM.  He is allergic to penicillin this causes hives.  Not a smoker or drinker or drug user.  He has no significant surgical history.  He denies any recent trauma travel or ill contacts.  Other than his recent hospitalization for the seizure.

## 2024-04-26 NOTE — ED ADULT NURSE NOTE - OBJECTIVE STATEMENT
Pt received in 16A 68y male AXO 4 from home 1 ast c/o pt having fevers. family reports pt has not felt himself since he had a seizure 2 days ago. pt has been having dizzy spells for months. PMH BPH, GERD, Melanoma, MVP. Pts family states he was here 2 days ago w/ new onset seizure, d/c on Kepra. Pt today states he has developed fever (102.5 Highest), body aches, chills and generalized weakness. Upon assessment pt c/o weakness, body aches, chills. Left 20 AC placed, labs drawn, cultures drawn 15 min apart from 2 different sites, meds given as prescribed, x-ray performed. Pending results.

## 2024-04-26 NOTE — ED PROVIDER NOTE - NSICDXPASTMEDICALHX_GEN_ALL_CORE_FT
PAST MEDICAL HISTORY:  Benign prostatic hyperplasia     GERD (gastroesophageal reflux disease)     GERD (gastroesophageal reflux disease)     History of BPH     Melanoma     Moderate mitral regurgitation     MVP (mitral valve prolapse)

## 2024-04-26 NOTE — ED PROVIDER NOTE - CLINICAL SUMMARY MEDICAL DECISION MAKING FREE TEXT BOX
Patient is a 68-year-old male was recently admitted to the hospital for seizure evaluation and care.  This is new onset seizures.  Prior to the onset of seizures in the before he was feeling unwell.  He was discharged home after an negative EEG and negative MRI, on Kera.  He has had no seizures since, but developed shaking chills rigors and was found to have a fever to 102.8 by his family members.  They been doing antipyretics at home, but given his recent admission for the seizures, out of an abundance of caution they brought him back to the emergency room for further care and evaluation.  Of note on review of his prior admission, patient had blood culture positive in 1 bottle of gram-positive cocci.  Reported as a contaminant by the lab.  Plan of care includes rule out sepsis rule out strep rule out COVID rule out flu.  Rule out pneumonia.  Rule out UTI.  Empiric antibiotic coverage with Rocephin 2 g.  Patient will likely need to be admitted.  Will obtain a procalcitonin.  IV fluid resuscitation.  Antipyretics.  This chart was made with dictation software and may contain typographical errors.

## 2024-04-26 NOTE — ED ADULT NURSE REASSESSMENT NOTE - NS ED NURSE REASSESS COMMENT FT1
Spoke to patient and family during this round informed them to call me or use call bell for assistance; chairs provided to patient moved to 12 for privacy

## 2024-04-26 NOTE — PROGRESS NOTE ADULT - SUBJECTIVE AND OBJECTIVE BOX
Attempted to reach patient overnight  by calling the number in the system ([phone]) but unsuccessful regarding positive blood culture growing in one bottle (gram positive cocci in pair in aerobic bottle). This may be possible contaminant. Will reach out to day physician to relay message.

## 2024-04-26 NOTE — ED ADULT NURSE NOTE - NSFALLHARMRISKINTERV_ED_ALL_ED

## 2024-04-26 NOTE — ED ADULT TRIAGE NOTE - CHIEF COMPLAINT QUOTE
pt has been having fevers. family reports pt has not felt himself since he had a seizure 2 days ago. pt has been having dizzy spells for months

## 2024-04-26 NOTE — ED PROVIDER NOTE - ENMT, MLM
Airway patent, Nasal mucosa clear. Mouth with normal mucosa. Throat has no vesicles, no oropharyngeal exudates and uvula is midline. no palatal petechi no erythema no exudate

## 2024-04-26 NOTE — ED PROVIDER NOTE - WR ORDER ID 1
Spoke with Anel who mentioned calling the office this morning regarding orders for UA. Results pending.   [id number]

## 2024-04-27 ENCOUNTER — TRANSCRIPTION ENCOUNTER (OUTPATIENT)
Age: 69
End: 2024-04-27

## 2024-04-27 DIAGNOSIS — Z29.9 ENCOUNTER FOR PROPHYLACTIC MEASURES, UNSPECIFIED: ICD-10-CM

## 2024-04-27 DIAGNOSIS — R65.10 SYSTEMIC INFLAMMATORY RESPONSE SYNDROME (SIRS) OF NON-INFECTIOUS ORIGIN WITHOUT ACUTE ORGAN DYSFUNCTION: ICD-10-CM

## 2024-04-27 DIAGNOSIS — Z86.73 PERSONAL HISTORY OF TRANSIENT ISCHEMIC ATTACK (TIA), AND CEREBRAL INFARCTION WITHOUT RESIDUAL DEFICITS: ICD-10-CM

## 2024-04-27 DIAGNOSIS — E78.5 HYPERLIPIDEMIA, UNSPECIFIED: ICD-10-CM

## 2024-04-27 DIAGNOSIS — Z86.69 PERSONAL HISTORY OF OTHER DISEASES OF THE NERVOUS SYSTEM AND SENSE ORGANS: ICD-10-CM

## 2024-04-27 DIAGNOSIS — K21.9 GASTRO-ESOPHAGEAL REFLUX DISEASE WITHOUT ESOPHAGITIS: ICD-10-CM

## 2024-04-27 DIAGNOSIS — A41.9 SEPSIS, UNSPECIFIED ORGANISM: ICD-10-CM

## 2024-04-27 DIAGNOSIS — N40.0 BENIGN PROSTATIC HYPERPLASIA WITHOUT LOWER URINARY TRACT SYMPTOMS: ICD-10-CM

## 2024-04-27 PROBLEM — Z87.438 PERSONAL HISTORY OF OTHER DISEASES OF MALE GENITAL ORGANS: Chronic | Status: ACTIVE | Noted: 2024-04-24

## 2024-04-27 LAB
ALBUMIN SERPL ELPH-MCNC: 3 G/DL — LOW (ref 3.3–5)
ALP SERPL-CCNC: 57 U/L — SIGNIFICANT CHANGE UP (ref 40–120)
ALT FLD-CCNC: 30 U/L — SIGNIFICANT CHANGE UP (ref 12–78)
ANION GAP SERPL CALC-SCNC: 5 MMOL/L — SIGNIFICANT CHANGE UP (ref 5–17)
APPEARANCE UR: ABNORMAL
AST SERPL-CCNC: 28 U/L — SIGNIFICANT CHANGE UP (ref 15–37)
BACTERIA # UR AUTO: ABNORMAL /HPF
BASOPHILS # BLD AUTO: 0.05 K/UL — SIGNIFICANT CHANGE UP (ref 0–0.2)
BASOPHILS NFR BLD AUTO: 0.3 % — SIGNIFICANT CHANGE UP (ref 0–2)
BILIRUB SERPL-MCNC: 0.7 MG/DL — SIGNIFICANT CHANGE UP (ref 0.2–1.2)
BILIRUB UR-MCNC: NEGATIVE — SIGNIFICANT CHANGE UP
BUN SERPL-MCNC: 19 MG/DL — SIGNIFICANT CHANGE UP (ref 7–23)
CALCIUM SERPL-MCNC: 7.8 MG/DL — LOW (ref 8.5–10.1)
CHLORIDE SERPL-SCNC: 113 MMOL/L — HIGH (ref 96–108)
CO2 SERPL-SCNC: 26 MMOL/L — SIGNIFICANT CHANGE UP (ref 22–31)
COLOR SPEC: YELLOW — SIGNIFICANT CHANGE UP
COMMENT - URINE: SIGNIFICANT CHANGE UP
CREAT SERPL-MCNC: 0.88 MG/DL — SIGNIFICANT CHANGE UP (ref 0.5–1.3)
DIFF PNL FLD: ABNORMAL
EGFR: 94 ML/MIN/1.73M2 — SIGNIFICANT CHANGE UP
EOSINOPHIL # BLD AUTO: 0.04 K/UL — SIGNIFICANT CHANGE UP (ref 0–0.5)
EOSINOPHIL NFR BLD AUTO: 0.3 % — SIGNIFICANT CHANGE UP (ref 0–6)
EPI CELLS # UR: PRESENT
GLUCOSE SERPL-MCNC: 135 MG/DL — HIGH (ref 70–99)
GLUCOSE UR QL: NEGATIVE MG/DL — SIGNIFICANT CHANGE UP
HCT VFR BLD CALC: 30.7 % — LOW (ref 39–50)
HGB BLD-MCNC: 10.5 G/DL — LOW (ref 13–17)
IMM GRANULOCYTES NFR BLD AUTO: 0.7 % — SIGNIFICANT CHANGE UP (ref 0–0.9)
KETONES UR-MCNC: NEGATIVE MG/DL — SIGNIFICANT CHANGE UP
LEUKOCYTE ESTERASE UR-ACNC: ABNORMAL
LYMPHOCYTES # BLD AUTO: 1.26 K/UL — SIGNIFICANT CHANGE UP (ref 1–3.3)
LYMPHOCYTES # BLD AUTO: 8.2 % — LOW (ref 13–44)
MCHC RBC-ENTMCNC: 28.7 PG — SIGNIFICANT CHANGE UP (ref 27–34)
MCHC RBC-ENTMCNC: 34.2 GM/DL — SIGNIFICANT CHANGE UP (ref 32–36)
MCV RBC AUTO: 83.9 FL — SIGNIFICANT CHANGE UP (ref 80–100)
MONOCYTES # BLD AUTO: 1.32 K/UL — HIGH (ref 0–0.9)
MONOCYTES NFR BLD AUTO: 8.6 % — SIGNIFICANT CHANGE UP (ref 2–14)
NEUTROPHILS # BLD AUTO: 12.65 K/UL — HIGH (ref 1.8–7.4)
NEUTROPHILS NFR BLD AUTO: 81.9 % — HIGH (ref 43–77)
NITRITE UR-MCNC: POSITIVE
NRBC # BLD: 0 /100 WBCS — SIGNIFICANT CHANGE UP (ref 0–0)
PH UR: 6 — SIGNIFICANT CHANGE UP (ref 5–8)
PLATELET # BLD AUTO: 165 K/UL — SIGNIFICANT CHANGE UP (ref 150–400)
POTASSIUM SERPL-MCNC: 3.7 MMOL/L — SIGNIFICANT CHANGE UP (ref 3.5–5.3)
POTASSIUM SERPL-SCNC: 3.7 MMOL/L — SIGNIFICANT CHANGE UP (ref 3.5–5.3)
PROT SERPL-MCNC: 5.7 G/DL — LOW (ref 6–8.3)
PROT UR-MCNC: NEGATIVE MG/DL — SIGNIFICANT CHANGE UP
RAPID RVP RESULT: SIGNIFICANT CHANGE UP
RBC # BLD: 3.66 M/UL — LOW (ref 4.2–5.8)
RBC # FLD: 12.8 % — SIGNIFICANT CHANGE UP (ref 10.3–14.5)
RBC CASTS # UR COMP ASSIST: 2 /HPF — SIGNIFICANT CHANGE UP (ref 0–4)
SARS-COV-2 RNA SPEC QL NAA+PROBE: SIGNIFICANT CHANGE UP
SODIUM SERPL-SCNC: 144 MMOL/L — SIGNIFICANT CHANGE UP (ref 135–145)
SP GR SPEC: 1.01 — SIGNIFICANT CHANGE UP (ref 1–1.03)
UROBILINOGEN FLD QL: 1 MG/DL — SIGNIFICANT CHANGE UP (ref 0.2–1)
WBC # BLD: 15.43 K/UL — HIGH (ref 3.8–10.5)
WBC # FLD AUTO: 15.43 K/UL — HIGH (ref 3.8–10.5)
WBC UR QL: 22 /HPF — HIGH (ref 0–5)

## 2024-04-27 RX ORDER — LEVETIRACETAM 250 MG/1
750 TABLET, FILM COATED ORAL
Refills: 0 | Status: DISCONTINUED | OUTPATIENT
Start: 2024-04-27 | End: 2024-04-29

## 2024-04-27 RX ORDER — ENOXAPARIN SODIUM 100 MG/ML
40 INJECTION SUBCUTANEOUS EVERY 24 HOURS
Refills: 0 | Status: DISCONTINUED | OUTPATIENT
Start: 2024-04-27 | End: 2024-04-29

## 2024-04-27 RX ORDER — ACETAMINOPHEN 500 MG
650 TABLET ORAL ONCE
Refills: 0 | Status: COMPLETED | OUTPATIENT
Start: 2024-04-27 | End: 2024-04-27

## 2024-04-27 RX ORDER — ASPIRIN/CALCIUM CARB/MAGNESIUM 324 MG
81 TABLET ORAL DAILY
Refills: 0 | Status: DISCONTINUED | OUTPATIENT
Start: 2024-04-27 | End: 2024-04-29

## 2024-04-27 RX ORDER — PANTOPRAZOLE SODIUM 20 MG/1
40 TABLET, DELAYED RELEASE ORAL
Refills: 0 | Status: DISCONTINUED | OUTPATIENT
Start: 2024-04-27 | End: 2024-04-29

## 2024-04-27 RX ORDER — CEFEPIME 1 G/1
2000 INJECTION, POWDER, FOR SOLUTION INTRAMUSCULAR; INTRAVENOUS EVERY 8 HOURS
Refills: 0 | Status: DISCONTINUED | OUTPATIENT
Start: 2024-04-27 | End: 2024-04-29

## 2024-04-27 RX ORDER — ATORVASTATIN CALCIUM 80 MG/1
40 TABLET, FILM COATED ORAL AT BEDTIME
Refills: 0 | Status: DISCONTINUED | OUTPATIENT
Start: 2024-04-27 | End: 2024-04-29

## 2024-04-27 RX ORDER — ACETAMINOPHEN 500 MG
650 TABLET ORAL EVERY 6 HOURS
Refills: 0 | Status: DISCONTINUED | OUTPATIENT
Start: 2024-04-27 | End: 2024-04-29

## 2024-04-27 RX ORDER — CEFPODOXIME PROXETIL 100 MG
1 TABLET ORAL
Qty: 14 | Refills: 0
Start: 2024-04-27 | End: 2024-05-03

## 2024-04-27 RX ORDER — TAMSULOSIN HYDROCHLORIDE 0.4 MG/1
0.8 CAPSULE ORAL AT BEDTIME
Refills: 0 | Status: DISCONTINUED | OUTPATIENT
Start: 2024-04-27 | End: 2024-04-29

## 2024-04-27 RX ORDER — SODIUM CHLORIDE 9 MG/ML
1000 INJECTION INTRAMUSCULAR; INTRAVENOUS; SUBCUTANEOUS
Refills: 0 | Status: DISCONTINUED | OUTPATIENT
Start: 2024-04-27 | End: 2024-04-29

## 2024-04-27 RX ADMIN — CEFEPIME 100 MILLIGRAM(S): 1 INJECTION, POWDER, FOR SOLUTION INTRAMUSCULAR; INTRAVENOUS at 22:11

## 2024-04-27 RX ADMIN — TAMSULOSIN HYDROCHLORIDE 0.8 MILLIGRAM(S): 0.4 CAPSULE ORAL at 22:11

## 2024-04-27 RX ADMIN — Medication 650 MILLIGRAM(S): at 18:56

## 2024-04-27 RX ADMIN — ENOXAPARIN SODIUM 40 MILLIGRAM(S): 100 INJECTION SUBCUTANEOUS at 11:24

## 2024-04-27 RX ADMIN — SODIUM CHLORIDE 75 MILLILITER(S): 9 INJECTION INTRAMUSCULAR; INTRAVENOUS; SUBCUTANEOUS at 17:18

## 2024-04-27 RX ADMIN — Medication 81 MILLIGRAM(S): at 11:19

## 2024-04-27 RX ADMIN — PANTOPRAZOLE SODIUM 40 MILLIGRAM(S): 20 TABLET, DELAYED RELEASE ORAL at 06:28

## 2024-04-27 RX ADMIN — LEVETIRACETAM 750 MILLIGRAM(S): 250 TABLET, FILM COATED ORAL at 17:05

## 2024-04-27 RX ADMIN — Medication 650 MILLIGRAM(S): at 17:05

## 2024-04-27 RX ADMIN — LEVETIRACETAM 750 MILLIGRAM(S): 250 TABLET, FILM COATED ORAL at 06:28

## 2024-04-27 RX ADMIN — SODIUM CHLORIDE 75 MILLILITER(S): 9 INJECTION INTRAMUSCULAR; INTRAVENOUS; SUBCUTANEOUS at 01:51

## 2024-04-27 RX ADMIN — ATORVASTATIN CALCIUM 40 MILLIGRAM(S): 80 TABLET, FILM COATED ORAL at 22:10

## 2024-04-27 RX ADMIN — CEFEPIME 100 MILLIGRAM(S): 1 INJECTION, POWDER, FOR SOLUTION INTRAMUSCULAR; INTRAVENOUS at 14:01

## 2024-04-27 RX ADMIN — CEFEPIME 100 MILLIGRAM(S): 1 INJECTION, POWDER, FOR SOLUTION INTRAMUSCULAR; INTRAVENOUS at 06:28

## 2024-04-27 RX ADMIN — Medication 1 TABLET(S): at 11:20

## 2024-04-27 NOTE — PATIENT PROFILE ADULT - NSPROGENOTHERPROVIDER_GEN_A_NUR
Date/Time of Note


Date/Time of Note


DATE: 1/15/19 


TIME: 14:53





Post-Anesthesia Notes


Post-Anesthesia Note


Last documented vital signs





Vital Signs


  Date      Temp  Pulse  Resp  B/P (MAP)   Pulse Ox  O2          O2 Flow    FiO2


Time                                                 Delivery    Rate


   1/15/19  98.4     74    18      139/81        99  Room Air


     13:41                          (100)





Activity:  WNL


Respiratory function:  WNL


Cardiovascular function:  WNL


Mental status:  Baseline


Pain reasonably controlled:  Yes


Hydration appropriate:  Yes


Nausea/Vomiting absent:  Yes


Comments


Bp: 104/72


HR: 71


RR: 15


T: 98


SaO2: 99%











RYLEE DONNELLY MD                Esau 15, 2019 14:54 none

## 2024-04-27 NOTE — CONSULT NOTE ADULT - ASSESSMENT
68-year-old male with history of GERD, BPH presenting with fever to 102.8F at home and chills. Admitted for sepsis 2/2 UTI.   Meeting SIRS criteria with fever (T 100.8F), tachycardia  (), leukocytosis (WBC 13.58)    UA from 4/24 compared to UA from 4/27; UTI appears to be new  --Cultures pending  --On last admission (4/24), blood cultures with strep mitis/oralis in 1/4 bottles, likely contamination  Lactate wnl, procalcitonin elevated at 0.50   Leukocytosis uptrending since admission from 12 -->15  COVID/flu/RSV negative  CXR negative    At time of evaluation, pt w/ wife, daughter Mandi and daughter-in-law Jane at bedside  Reporting that he has clinically improved and feels better since when he came in for this admission     Pt and wife have family wedding to attend in which the patient is part of the groom party  Ideally would wait for WBC to trend downward and await cultures, however given that the pt has clinically improved and is adamant that he be there for ceremony tomorrow, will make an exception in this case.     I discussed at length with patient, pt's wife Kelley, pt's daughter Mandi ([employer] employee) and pt's daughter-in-law Jane (pediatric NP) that we can send home on PO antibiotics, however if culture results show bacteremia OR MDRO, they will have to bring the patient back for further management and work-up. They were also instructed to bring the patient back if he continues to experience altered mental status or shows signs of clinical worsening.     I will review culture results with Mandi via phone on Monday  I will order labs for patient -- CMP, CBC, Blood cultures x2 at [facility] labs--Mandi was provided with instructions on where to go.   Can send on PO cefpodoxime 200mg BID to complete x7 day course for presumed UTI. Will make adjustments to Abx therapy as needed    No ID objection to d/c planning given the above circumstances as pt and pt's family agreed to close follow-up  Mandi was provided with my contact information in case of any questions    D/w Dr. Couch  Please call with any questions.  Nayely Couch M.D.  [facility] Division of Infectious Diseases [phone]  For after 5 P.M. and weekends, please call [phone]       68-year-old male with history of GERD, BPH presenting with fever to 102.8F at home and chills. Admitted for sepsis 2/2 UTI.   Meeting SIRS criteria with fever (T 100.8F), tachycardia  (), leukocytosis (WBC 13.58)    UA from 4/24 compared to UA from 4/27; UTI appears to be new  --Cultures pending  --On last admission (4/24), blood cultures with strep mitis/oralis in 1/4 bottles, likely contamination  Lactate wnl, procalcitonin elevated at 0.50   Leukocytosis uptrending since admission from 12 -->15  COVID/flu/RSV negative  CXR negative    At time of evaluation, pt w/ wife, daughter Mandi and daughter-in-law Jane at bedside  Reporting that he has clinically improved and feels better since when he came in for this admission     Pt and wife have family wedding to attend in which the patient is part of the groom party  Ideally would wait for WBC to trend downward and await cultures, however given that the pt has clinically improved and is adamant that he be there for ceremony tomorrow, will make an exception in this case.     I discussed at length with patient, pt's wife Kelley, pt's daughter Mandi ([employer] employee) and pt's daughter-in-law Jane (pediatric NP) that we can send home on PO antibiotics, however if culture results show bacteremia OR MDRO, they will have to bring the patient back for further management and work-up. They were also instructed to bring the patient back if he continues to experience altered mental status or shows signs of clinical worsening.     I will review culture results with Mandi via phone on Monday  I will order labs for patient -- CMP, CBC, Blood cultures x2 at [facility] labs--Mandi was provided with instructions on where to go.   Can send on PO cefpodoxime 200mg BID to complete x7 day course for presumed UTI. Will make adjustments to Abx therapy as needed    No ID objection to d/c planning given the above circumstances as pt and pt's family agreed to close follow-up  Mandi was provided with my contact information in case of any questions    Addendum: Pt febrile again--they will stay  C/w cefepime  F/u pending cx  Further recs to follow    D/w Dr. Couch  Infectious Diseases will follow. Please call with any questions.  Nayely Couch M.D.  [facility] Division of Infectious Diseases [phone]  For after 5 P.M. and weekends, please call [phone]

## 2024-04-27 NOTE — DISCHARGE NOTE PROVIDER - HOSPITAL COURSE
ADMISSION H+P:    HPI:  68-year-old male with history of recent new onset seizure, past CVA with no residual symptoms, HLD, GERD, BPH presenting with fever to 102.8F. Pt was recently admitted to [facility] from 4/24-4/25 for suspected new onset seizure, Was seen by Dr. Puckett and was started on atorvastatin, aspirin, and Keppra. Since discharge, pt has been compliant with all medications. However today, pt was noted by family to appear weak associated with chills and some mild confusion. Also found be to febrile at home to 102.8F, at which point family brought pt back to the ED. Denies recent travel, recent illness, or sick contacts. Denies cough, shortness of breath, chest pain, abdominal pain, nausea, vomiting, diarrhea, constipation, pain or burning with urination.     ED course  VS: T 100.8F orally, , /68, RR 18, SpO2 94% RA  Labs: WBC 13.58, H/H 11.7/34.3, Ca 8.4, TBili 1.5, procalcitonin 0.50  Imaging:  - CXR clear on wet read  EKG: NSR HR 86, QTc 447  In ED given: Rocephin 2g x1, 2L NS bolus x1, ofirmex 1g x1   (27 Apr 2024 01:16)      ---  HOSPITAL COURSE:   Pt was admitted for acute febrile illness 2/2 possible UTI. CXR was negative. Flu and COVID were negative. IV abx and IV fluids were started with clinical improvement. ID was consulted and IV abx were switched to PO abx.     Patient was medically optimized and improved clinically throughout hospital course. Patient seen and examined on day of discharge.    Vital Signs  T(C): 36.9 (27 Apr 2024 13:18), Max: 38.2 (26 Apr 2024 21:57)  T(F): 98.4 (27 Apr 2024 13:18), Max: 100.8 (26 Apr 2024 21:57)  HR: 73 (27 Apr 2024 13:18) (73 - 112)  BP: 135/76 (27 Apr 2024 13:18) (104/60 - 135/76)  RR: 18 (27 Apr 2024 13:18) (18 - 18)  SpO2: 97% (27 Apr 2024 13:18) (94% - 98%)    Physical Exam:  General: well-developed, well-nourished, NAD  HEENT: normocephalic, atraumatic, EOMI, moist mucous membranes   Neck: supple, non-tender, no masses  Neurology: AAOx3, sensation intact  Respiratory: clear to auscultation bilaterally; no wheezes, rhonchi, or rales  CV: regular rate and rhythm, soft S1/S2, no murmurs, rubs, or gallops  Abdominal: soft, non-tender, non-distended, bowel sounds present  Extremities: no clubbing, cyanosis, or edema; palpable peripheral pulses  Musculoskeletal: no joint erythema or warmth, no joint swelling   Skin: warm, dry, normal color    Patient is medically stable for discharge home with outpatient follow up.  ---  CONSULTANTS:   ID: Dr. Nayely Couch  ---  TIME SPENT:   I, the attending physician, was physically present for the key portions of the evaluation and management (E/M) service provided. The total amount of time spent reviewing the hospital course, laboratory values, imaging findings, assessing/counseling the patient, discussing with consultant physicians, social work, nursing staff was -- minutes.     ---  FINAL DISCHARGE DIAGNOSIS LIST:  Please see last daily progress note for final discharge diagnoses    ---  Primary care provider was made aware of plan for discharge:  [    ] NO     [     ] YES       ADMISSION H+P:    HPI:  68-year-old male with history of recent new onset seizure, past CVA with no residual symptoms, HLD, GERD, BPH presenting with fever to 102.8F. Pt was recently admitted to [facility] from 4/24-4/25 for suspected new onset seizure, Was seen by Dr. Puckett and was started on atorvastatin, aspirin, and Keppra. Since discharge, pt has been compliant with all medications. However today, pt was noted by family to appear weak associated with chills and some mild confusion. Also found be to febrile at home to 102.8F, at which point family brought pt back to the ED. Denies recent travel, recent illness, or sick contacts. Denies cough, shortness of breath, chest pain, abdominal pain, nausea, vomiting, diarrhea, constipation, pain or burning with urination.     ED course  VS: T 100.8F orally, , /68, RR 18, SpO2 94% RA  Labs: WBC 13.58, H/H 11.7/34.3, Ca 8.4, TBili 1.5, procalcitonin 0.50  Imaging:  - CXR clear on wet read  EKG: NSR HR 86, QTc 447  In ED given: Rocephin 2g x1, 2L NS bolus x1, ofirmex 1g x1   (27 Apr 2024 01:16)      ---  HOSPITAL COURSE:   Pt was admitted for acute febrile illness 2/2 possible UTI. CXR was negative. Flu and COVID were negative. IV abx and IV fluids were started with clinical improvement. ID was consulted and IV abx were switched to PO abx.     Patient was medically optimized and improved clinically throughout hospital course. Patient seen and examined on day of discharge.    Vital Signs  T(C): 36.9 (27 Apr 2024 13:18), Max: 38.2 (26 Apr 2024 21:57)  T(F): 98.4 (27 Apr 2024 13:18), Max: 100.8 (26 Apr 2024 21:57)  HR: 73 (27 Apr 2024 13:18) (73 - 112)  BP: 135/76 (27 Apr 2024 13:18) (104/60 - 135/76)  RR: 18 (27 Apr 2024 13:18) (18 - 18)  SpO2: 97% (27 Apr 2024 13:18) (94% - 98%)    Physical Exam:  General: well-developed, well-nourished, NAD  HEENT: normocephalic, atraumatic, EOMI, moist mucous membranes   Neck: supple, non-tender, no masses  Neurology: AAOx3, sensation intact  Respiratory: clear to auscultation bilaterally; no wheezes, rhonchi, or rales  CV: regular rate and rhythm, soft S1/S2, no murmurs, rubs, or gallops  Abdominal: soft, non-tender, non-distended, bowel sounds present  Extremities: no clubbing, cyanosis, or edema; palpable peripheral pulses  Musculoskeletal: no joint erythema or warmth, no joint swelling   Skin: warm, dry, normal color    Patient is medically stable for discharge home with outpatient follow up.  ---  CONSULTANTS:   ID: Dr. Nayely Couch  ---  TIME SPENT:   I, the attending physician, was physically present for the key portions of the evaluation and management (E/M) service provided. The total amount of time spent reviewing the hospital course, laboratory values, imaging findings, assessing/counseling the patient, discussing with consultant physicians, social work, nursing staff was 60 minutes.     ---  FINAL DISCHARGE DIAGNOSIS LIST:  Please see last daily progress note for final discharge diagnoses       ADMISSION H+P:    HPI:  68-year-old male with history of recent new onset seizure, past CVA with no residual symptoms, HLD, GERD, BPH presenting with fever to 102.8F. Pt was recently admitted to [facility] from 4/24-4/25 for suspected new onset seizure, Was seen by Dr. Puckett and was started on atorvastatin, aspirin, and Keppra. Since discharge, pt has been compliant with all medications. However today, pt was noted by family to appear weak associated with chills and some mild confusion. Also found be to febrile at home to 102.8F, at which point family brought pt back to the ED. Denies recent travel, recent illness, or sick contacts. Denies cough, shortness of breath, chest pain, abdominal pain, nausea, vomiting, diarrhea, constipation, pain or burning with urination.     ED course  VS: T 100.8F orally, , /68, RR 18, SpO2 94% RA  Labs: WBC 13.58, H/H 11.7/34.3, Ca 8.4, TBili 1.5, procalcitonin 0.50  Imaging:  - CXR clear on wet read  EKG: NSR HR 86, QTc 447  In ED given: Rocephin 2g x1, 2L NS bolus x1, ofirmex 1g x1   (27 Apr 2024 01:16)      ---  HOSPITAL COURSE:   Pt was admitted for acute febrile illness 2/2 possible UTI. CXR was negative. Flu and COVID were negative. IV abx and IV fluids were started with clinical improvement. ID was consulted and IV abx were switched to PO abx. CT scan shows evidence of cystitis.  MR shows evidence of paraspinal fluid collection, likely related to outpatient spinal injection.  Concern on CT for   2.0 cm exophytic mass arising from the posterior aspect of the   gastric antrum, suspicious for a gastrointestinal stromal tumor (GIST).  There is masslike enhancement of the right lateral prostate gland, which  could be due to an underlying prostate mass..  TTE shows mild (grade 1) left ventricular diastolic dysfunction.      Patient was medically optimized and improved clinically throughout hospital course. Patient seen and examined on day of discharge.    Vital Signs  T(C): 36.9 (27 Apr 2024 13:18), Max: 38.2 (26 Apr 2024 21:57)  T(F): 98.4 (27 Apr 2024 13:18), Max: 100.8 (26 Apr 2024 21:57)  HR: 73 (27 Apr 2024 13:18) (73 - 112)  BP: 135/76 (27 Apr 2024 13:18) (104/60 - 135/76)  RR: 18 (27 Apr 2024 13:18) (18 - 18)  SpO2: 97% (27 Apr 2024 13:18) (94% - 98%)    Physical Exam:  General: well-developed, well-nourished, NAD  HEENT: normocephalic, atraumatic, EOMI, moist mucous membranes   Neck: supple, non-tender, no masses  Neurology: AAOx3, sensation intact  Respiratory: clear to auscultation bilaterally; no wheezes, rhonchi, or rales  CV: regular rate and rhythm, soft S1/S2, no murmurs, rubs, or gallops  Abdominal: soft, non-tender, non-distended, bowel sounds present  Extremities: no clubbing, cyanosis, or edema; palpable peripheral pulses  Musculoskeletal: no joint erythema or warmth, no joint swelling   Skin: warm, dry, normal color    Patient is medically stable for discharge home with outpatient follow up.  ---  CONSULTANTS:   ID: Dr. Nayely Couch  ---  TIME SPENT:   I, the attending physician, was physically present for the key portions of the evaluation and management (E/M) service provided. The total amount of time spent reviewing the hospital course, laboratory values, imaging findings, assessing/counseling the patient, discussing with consultant physicians, social work, nursing staff was 60 minutes.     ---  FINAL DISCHARGE DIAGNOSIS LIST:  Please see last daily progress note for final discharge diagnoses       ADMISSION H+P:    HPI:  68-year-old male with history of recent new onset seizure, past CVA with no residual symptoms, HLD, GERD, BPH presenting with fever to 102.8F. Pt was recently admitted to [facility] from 4/24-4/25 for suspected new onset seizure, Was seen by Dr. Puckett and was started on atorvastatin, aspirin, and Keppra. Since discharge, pt has been compliant with all medications. However today, pt was noted by family to appear weak associated with chills and some mild confusion. Also found be to febrile at home to 102.8F, at which point family brought pt back to the ED. Denies recent travel, recent illness, or sick contacts. Denies cough, shortness of breath, chest pain, abdominal pain, nausea, vomiting, diarrhea, constipation, pain or burning with urination.     ED course  VS: T 100.8F orally, , /68, RR 18, SpO2 94% RA  Labs: WBC 13.58, H/H 11.7/34.3, Ca 8.4, TBili 1.5, procalcitonin 0.50  Imaging:  - CXR clear on wet read  EKG: NSR HR 86, QTc 447  In ED given: Rocephin 2g x1, 2L NS bolus x1, ofirmex 1g x1   (27 Apr 2024 01:16)      ---  HOSPITAL COURSE:   Pt was admitted for acute febrile illness 2/2 possible UTI. CXR was negative. Flu and COVID were negative. NASAl PCR_Neg for MRSA, RVP- NEG IV abx and IV fluids were started with clinical improvement. ID was consulted and IV abx were switched to PO abx. CT scan shows evidence of cystitis.  MR shows evidence of paraspinal fluid collection, likely related to outpatient spinal injection.  Concern on CT for   2.0 cm exophytic mass arising from the posterior aspect of the   gastric antrum, suspicious for a gastrointestinal stromal tumor (GIST).  There is masslike enhancement of the right lateral prostate gland, which  could be due to an underlying prostate mass..  TTE shows mild (grade 1) left ventricular diastolic dysfunction.    < from: TTE W or WO Ultrasound Enhancing Agent (04.28.24 @ 14:14) >      CONCLUSIONS:     1. Left ventricular systolic function is normal with an ejection fraction visually estimated at 50 to 55 %.   2. There is normal LV mass and normal geometry.   3. There is mild (grade 1) left ventricular diastolic dysfunction.   4. Normal right ventricular cavity size, with normal wall thickness, and normal systolic function.   5. The left atrium is moderately dilated.   6. The right atrium is mildly dilated.   7. Prolapse of the anterior mitral leaflet and prolapse of the posterior mitral leaflet.   8. Mild mitral regurgitation.   9. Trileaflet aortic valve with normal systolic excursion.      < end of copied text >    < from: MR Lumbar Spine w/wo IV Cont (04.29.24 @ 09:39) >      IMPRESSION: Postop changes are identified.    Small collections are suspected in the posterior paraspinal soft tissue   region at the postop L2 and L3 levels. Clinical correlation and continued   close interval follow-up is recommended.    < end of copied text >    < from: CT Chest w/ Oral Cont and w/ IV Cont (04.28.24 @ 17:49) >    IMPRESSION:  Mild bladder wall thickening with subtle surrounding fat stranding,   suggestive of cystitis. Otherwise no infectious process identified within   the chest, abdomen, or pelvis.    There is a 2.0 cm exophytic mass arising from the posterior aspect of the   gastric antrum, suspicious for a gastrointestinal stromal tumor (GIST).    There is masslike enhancement of the right lateral prostate gland, which   could be due to an underlying prostate mass. Correlation with PSA is   recommended, and if elevated, prostate protocol MRI.      < end of copied text >        Patient was medically optimized and improved clinically throughout hospital course. Patient seen and examined on day of discharge.    Patient is medically stable for discharge home with outpatient follow up with Orthopedics in 3 days, GI &  in 2 weeks for further testing as out pt, PO Abx given on d/c for UTI/Cystitis findings as d/w ID, d/w Pt, wife & Dtr at detail.  ---  CONSULTANTS:   ID: Dr. Nayely Couch  ---  TIME SPENT:   I, the attending physician, was physically present for the key portions of the evaluation and management (E/M) service provided. The total amount of time spent reviewing the hospital course, laboratory values, imaging findings, assessing/counseling the patient, discussing with consultant physicians, social work, nursing staff was 60 minutes.     ---  FINAL DISCHARGE DIAGNOSIS LIST:  Please see last daily progress note for final discharge diagnoses

## 2024-04-27 NOTE — DISCHARGE NOTE PROVIDER - CARE PROVIDER_API CALL
Nayely Couch  Infectious Disease  [address] [facility], [address]  [place] [address]  Phone: [phone]  Fax: [phone]  Follow Up Time: 1 week    Filemon Grover  Cardiovascular Disease  [address] [facility], [address]nd [place] [address]  Phone: [phone]  Fax: [phone]  Follow Up Time: 1 week   Nayely Couch  Infectious Disease  [address] [facility], [address]  [place] [address]  Phone: [phone]  Fax: [phone]  Follow Up Time: 1 week    Filemon Grover  Cardiovascular Disease  [address] [facility], [address]nd [place] [address]  Phone: [phone]  Fax: [phone]  Follow Up Time: 1 week    Dany Carter  Orthopaedic Surgery  [address] [place] [address]  Phone: [phone]  Fax: [phone]  Follow Up Time: 1-3 days   Nayely Couch  Infectious Disease  [address] [facility], [address]  [place] [address]  Phone: [phone]  Fax: [phone]  Follow Up Time: 1 week    Filemon Grover  Cardiovascular Disease  [address] [facility], [address]nd [place] [address]  Phone: [phone]  Fax: [phone]  Follow Up Time: 1 week    Ramírez Murray  Orthopaedic Surgery  [address] [facility], [address]  [place] [address]  Phone: [phone]  Fax: [phone]  Follow Up Time: 1-3 days

## 2024-04-27 NOTE — DISCHARGE NOTE NURSING/CASE MANAGEMENT/SOCIAL WORK - NURSING SECTION COMPLETE
Patient/Caregiver provided printed discharge information. Sski Pregnancy And Lactation Text: This medication is Pregnancy Category D and isn't considered safe during pregnancy. It is excreted in breast milk.

## 2024-04-27 NOTE — DISCHARGE NOTE PROVIDER - NSDCMRMEDTOKEN_GEN_ALL_CORE_FT
alfuzosin 10 mg oral tablet, extended release: 1 tab(s) orally once a day  aspirin 81 mg oral tablet, chewable: 1 tab(s) orally once a day  atorvastatin 40 mg oral tablet: 1 tab(s) orally once a day (at bedtime)  cefpodoxime 200 mg oral tablet: 1 tab(s) orally 2 times a day  levETIRAcetam 750 mg oral tablet: 1 tab(s) orally 2 times a day  Multiple Vitamins oral tablet: 1 tab(s) orally once a day  omeprazole 20 mg oral delayed release capsule: 1 cap(s) orally once a day

## 2024-04-27 NOTE — H&P ADULT - NSICDXPASTMEDICALHX_GEN_ALL_CORE_FT
PAST MEDICAL HISTORY:  Benign prostatic hyperplasia     GERD (gastroesophageal reflux disease)     GERD (gastroesophageal reflux disease)     History of BPH     Melanoma     Moderate mitral regurgitation     MVP (mitral valve prolapse)      PAST MEDICAL HISTORY:  Benign prostatic hyperplasia     GERD (gastroesophageal reflux disease)     GERD (gastroesophageal reflux disease)     History of BPH     History of seizure     Melanoma     Moderate mitral regurgitation     MVP (mitral valve prolapse)

## 2024-04-27 NOTE — DISCHARGE NOTE PROVIDER - PROVIDER TOKENS
PROVIDER:[TOKEN:[[id number]],FOLLOWUP:[1 week]],PROVIDER:[TOKEN:[[id number]],FOLLOWUP:[1 week]] PROVIDER:[TOKEN:[[id number]],FOLLOWUP:[1 week]],PROVIDER:[TOKEN:[[id number]],FOLLOWUP:[1 week]],PROVIDER:[TOKEN:[[id number]],FOLLOWUP:[1-3 days]] PROVIDER:[TOKEN:[[id number]],FOLLOWUP:[1 week]],PROVIDER:[TOKEN:[[id number]],FOLLOWUP:[1 week]],PROVIDER:[TOKEN:[[id number]],FOLLOWUP:[1-3 days]]

## 2024-04-27 NOTE — PATIENT PROFILE ADULT - FALL HARM RISK - UNIVERSAL INTERVENTIONS
Bed in lowest position, wheels locked, appropriate side rails in place/Call bell, personal items and telephone in reach/Instruct patient to call for assistance before getting out of bed or chair/Non-slip footwear when patient is out of bed/[place] to call system/Physically safe environment - no spills, clutter or unnecessary equipment/Purposeful Proactive Rounding/Room/bathroom lighting operational, light cord in reach

## 2024-04-27 NOTE — H&P ADULT - NEUROLOGICAL
details… normal/cranial nerves II-XII intact/sensation intact AAOx 3/normal/cranial nerves II-XII intact/sensation intact/deep reflexes intact/cranial nerves intact

## 2024-04-27 NOTE — H&P ADULT - PROBLEM SELECTOR PLAN 6
DVT ppx:  - Lovenox Chronic:  - c/w omeprazole 20mg daily Chronic:  - c/w omeprazole 20mg daily (interchange with pantoprazole)

## 2024-04-27 NOTE — DISCHARGE NOTE NURSING/CASE MANAGEMENT/SOCIAL WORK - NSDCPEFALRISK_GEN_ALL_CORE
For information on Fall & Injury Prevention, visit: [email or web address] OR  [email or web address] OR  https://www.cdc.gov/steadi/patient.html

## 2024-04-27 NOTE — H&P ADULT - ASSESSMENT
68-year-old male with history of GERD, BPH presenting with fever to 102.8F at home and chills. Admitted for sepsis workup.  68-year-old male with history of GERD, BPH presenting with fever to 102.8F at home and chills. Admitted for Ac Febrile illness, Leukocytosis  &  workup.

## 2024-04-27 NOTE — H&P ADULT - ATTENDING COMMENTS
68-year-old male with history of GERD, BPH presenting with fever to 102.8F at home and chills. Admitted for Ac Febrile illness, Leukocytosis  &  workup.   Pt seen, examined, Case & care plan d/w pt, residents at detail.  Consults:  ID for Fever ,Leukocytosis   PO diet  AM labs   DVT ppx  GI Ppx

## 2024-04-27 NOTE — H&P ADULT - MUSCULOSKELETAL
normal/ROM intact/strength 5/5 bilateral upper extremities/strength 5/5 bilateral lower extremities details… normal/ROM intact/no joint swelling/no joint erythema/no joint warmth/no calf tenderness/strength 5/5 bilateral upper extremities/strength 5/5 bilateral lower extremities/no chest wall tenderness

## 2024-04-27 NOTE — H&P ADULT - HISTORY OF PRESENT ILLNESS
68-year-old male with history of recent new onset seizure, past CVA with no residual symptoms, GERD, BPH presenting with fever to 102.8F. Pt was recently admitted to [facility] from 4/24-4/25 for suspected new onset seizure, Was seen by Dr. Puckett and was started on atorvastatin, aspirin, and Keppra. Since discharge, pt has been compliant with all medications. However today, pt was noted by family to appear weak associated with chills and some mild confusion. Also found be to febrile at home to 102.8F, at which point family brought pt back to the ED. Denies recent travel, recent illness, or sick contacts. Denies cough, shortness of breath, chest pain, abdominal pain, nausea, vomiting, diarrhea, constipation, pain or burning with urination.     ED course  VS: T 100.8F orally, , /68, RR 18, SpO2 94% RA  Labs: WBC 13.58, H/H 11.7/34.3, Ca 8.4, TBili 1.5, procalcitonin 0.50  Imaging:  - CXR clear on wet read  EKG: NSR HR 86, QTc 447  In ED given: Rocephin 2g x1, 2L NS bolus x1, ofirmex 1g x1   68-year-old male with history of recent new onset seizure, past CVA with no residual symptoms, HLD, GERD, BPH presenting with fever to 102.8F. Pt was recently admitted to [facility] from 4/24-4/25 for suspected new onset seizure, Was seen by Dr. Puckett and was started on atorvastatin, aspirin, and Keppra. Since discharge, pt has been compliant with all medications. However today, pt was noted by family to appear weak associated with chills and some mild confusion. Also found be to febrile at home to 102.8F, at which point family brought pt back to the ED. Denies recent travel, recent illness, or sick contacts. Denies cough, shortness of breath, chest pain, abdominal pain, nausea, vomiting, diarrhea, constipation, pain or burning with urination.     ED course  VS: T 100.8F orally, , /68, RR 18, SpO2 94% RA  Labs: WBC 13.58, H/H 11.7/34.3, Ca 8.4, TBili 1.5, procalcitonin 0.50  Imaging:  - CXR clear on wet read  EKG: NSR HR 86, QTc 447  In ED given: Rocephin 2g x1, 2L NS bolus x1, ofirmex 1g x1   68-year-old male with history of recent new onset seizure 4/24/24 , past  small CVA with no residual symptoms, HLD, GERD, BPH presenting with fever to 102.8F. Pt was recently admitted to [facility] from 4/24-4/25 for suspected new onset seizure, Was seen by Dr. Puckett and was started on atorvastatin, aspirin, and Keppra. Since discharge, pt has been compliant with all medications. However today, pt was noted by family to appear weak associated with chills and some mild confusion. Also found be to febrile at home to 102.8F, at which point family brought pt back to the ED. Denies recent travel, recent illness, or sick contacts. Denies cough, shortness of breath, chest pain, abdominal pain, nausea, vomiting, diarrhea, constipation, pain or burning with urination.     ED course  VS: T 100.8F orally, , /68, RR 18, SpO2 94% RA  Labs: WBC 13.58, H/H 11.7/34.3, Ca 8.4, TBili 1.5, procalcitonin 0.50  Imaging:  - CXR clear on wet read  EKG: NSR HR 86, QTc 447  In ED given: Rocephin 2g x1, 2L NS bolus x1, OFIRMEV 1g x1

## 2024-04-27 NOTE — H&P ADULT - PROBLEM SELECTOR PLAN 1
Meeting SIRS criteria with fever (T 100.8F), tachycardia  (), leukocytosis (WBC 13.58)  - On last admission (4/24), blood cultures with strep mitis/oralis in 1/4 bottles, likely contamination  - lactate wnl, procalcitonin elevated at 0.50   - COVID/flu/RSV negative  - CXR clear on wet read, f/u official read  - full RVP sent  - UA pending  - urine culture sent   - Blood cultures sent x2  - maintenance IVF with NS at 75 cc/hr  - s/p Rocephin 2g x1 in ED, will transition to cefepime   - ID consulted (Dr. Liu), f/u recs Meeting SIRS criteria with fever (T 100.8F), tachycardia  (), leukocytosis (WBC 13.58)  - On last admission (4/24), blood cultures with strep mitis/oralis in 1/4 bottles, likely contamination  - lactate wnl, procalcitonin elevated at 0.50   - COVID/flu/RSV negative  - CXR clear on wet read, f/u official read  - full RVP sent  - UA pending  - urine culture sent   - Blood cultures sent x2  - maintenance IVF with NS at 75 cc/hr  - s/p Rocephin 2g x1 in ED, will transition to cefepime 2g q8h  - ID consulted (Dr. Liu), f/u recs Meeting SIRS criteria with fever (T 100.8F), tachycardia  (), leukocytosis (WBC 13.58)  Ac febrile Illness -Leukocytosis ? Etiology  R/O UTI   - On last admission (4/24), blood cultures with strep mitis/oralis in 1/4 bottles, likely contamination  - lactate wnl, procalcitonin elevated at 0.50   - COVID/flu/RSV negative  - CXR clear on wet read, f/u official read  - full RVP sent  - UA pending, Urine C/s pending   - urine culture sent   - Blood cultures sent x2  - maintenance IVF with NS at 75 cc/hr  - s/p Rocephin 2g x1 in ED, will transition to cefepime 2g q8h  - ID consulted (Dr. Liu), f/u recs

## 2024-04-27 NOTE — CONSULT NOTE ADULT - SUBJECTIVE AND OBJECTIVE BOX
Kate, Division of Infectious Diseases  BRITTANIE Sterling, TAWANA Couch, MARCUS Guerrero [facility]  [phone]    GABE BLOOD  68y, Male  [id number]    HPI--  HPI:  68-year-old male with history of recent new onset seizure, past CVA with no residual symptoms, HLD, GERD, BPH presenting with fever to 102.8F. Pt was recently admitted to [facility] from 4/24-4/25 for suspected new onset seizure, Was seen by Dr. Puckett and was started on atorvastatin, aspirin, and Keppra. Since discharge, pt has been compliant with all medications. However today, pt was noted by family to appear weak associated with chills and some mild confusion. Also found be to febrile at home to 102.8F, at which point family brought pt back to the ED. Denies recent travel, recent illness, or sick contacts. Denies cough, shortness of breath, chest pain, abdominal pain, nausea, vomiting, diarrhea, constipation, pain or burning with urination.     ED course  VS: T 100.8F orally, , /68, RR 18, SpO2 94% RA  Labs: WBC 13.58, H/H 11.7/34.3, Ca 8.4, TBili 1.5, procalcitonin 0.50  Imaging:  - CXR clear on wet read  EKG: NSR HR 86, QTc 447  In ED given: Rocephin 2g x1, 2L NS bolus x1, ofirmex 1g x1   (27 Apr 2024 01:16)    ID c/s for further evaluation  Currently on cefepime    Active Medications--  acetaminophen     Tablet .. 650 milliGRAM(s) Oral every 6 hours PRN  aspirin  chewable 81 milliGRAM(s) Oral daily  atorvastatin 40 milliGRAM(s) Oral at bedtime  cefepime   IVPB 2000 milliGRAM(s) IV Intermittent every 8 hours  enoxaparin Injectable 40 milliGRAM(s) SubCutaneous every 24 hours  levETIRAcetam 750 milliGRAM(s) Oral two times a day  multivitamin 1 Tablet(s) Oral daily  pantoprazole    Tablet 40 milliGRAM(s) Oral before breakfast  sodium chloride 0.9%. 1000 milliLiter(s) IV Continuous <Continuous>  tamsulosin 0.8 milliGRAM(s) Oral at bedtime    Antimicrobials:   cefepime   IVPB 2000 milliGRAM(s) IV Intermittent every 8 hours    Immunologic:     ROS:  CONSTITUTIONAL: No fevers or chills. No weakness or headache. No weight changes.  EYES/ENT: No visual or hearing changes. No sore throat or throat pain .  NECK: No pain or stiffness  RESPIRATORY: No cough, wheezing, or hemoptysis. No shortness of breath  CARDIOVASCULAR: No chest pain or palpitations  GASTROINTESTINAL: No abdominal pain. No nausea or vomiting. No diarrhea or constipation.  GENITOURINARY: No dysuria, frequency or hematuria  NEUROLOGICAL: No numbness or weakness  SKIN: No itching or rashes  PSYCHIATRIC: Pleasant. Appropriate affect    Allergies: penicillin (Rash)  penicillin (Hives)    PMH -- Benign prostatic hyperplasia    GERD (gastroesophageal reflux disease)    Melanoma    MVP (mitral valve prolapse)    Moderate mitral regurgitation    GERD (gastroesophageal reflux disease)    History of BPH    History of seizure      PSH -- S/P hernia repair    S/P lumbar laminectomy    Status post dilation of esophageal narrowing      FH --   Social History --  EtOH: denies   Tobacco: denies   Drug Use: denies     Travel/Environmental/Occupational History:    Physical Exam--  Vital Signs Last 24 Hrs  T(F): 98.4 (27 Apr 2024 13:18), Max: 100.8 (26 Apr 2024 21:57)  HR: 73 (27 Apr 2024 13:18) (73 - 112)  BP: 135/76 (27 Apr 2024 13:18) (104/60 - 135/76)  RR: 18 (27 Apr 2024 13:18) (18 - 18)  SpO2: 97% (27 Apr 2024 13:18) (94% - 98%)  General: nontoxic-appearing, no acute distress  HEENT: NC/AT, EOMI,  Lungs: Clear bilaterally without rales, wheezing or rhonchi  Heart: Regular rate and rhythm.   Abdomen: Soft. Nondistended. Nontender.  Extremities: No cyanosis or clubbing. No edema.   Skin: Warm. Dry. Good turgor.     Laboratory & Imaging Data:  CBC:                       10.5   15.43 )-----------( 165      ( 27 Apr 2024 04:50 )             30.7     CMP: 04-27    144  |  113<H>  |  19  ----------------------------<  135<H>  3.7   |  26  |  0.88    Ca    7.8<L>      27 Apr 2024 04:50    TPro  5.7<L>  /  Alb  3.0<L>  /  TBili  0.7  /  DBili  x   /  AST  28  /  ALT  30  /  AlkPhos  57  04-27    LIVER FUNCTIONS - ( 27 Apr 2024 04:50 )  Alb: 3.0 g/dL / Pro: 5.7 g/dL / ALK PHOS: 57 U/L / ALT: 30 U/L / AST: 28 U/L / GGT: x           Urinalysis Basic - ( 27 Apr 2024 04:50 )    Color: x / Appearance: x / SG: x / pH: x  Gluc: 135 mg/dL / Ketone: x  / Bili: x / Urobili: x   Blood: x / Protein: x / Nitrite: x   Leuk Esterase: x / RBC: x / WBC x   Sq Epi: x / Non Sq Epi: x / Bacteria: x        Microbiology: reviewed    Culture - Blood (collected 04-24-24 @ 09:38)  Source: .Blood Blood-Peripheral  Preliminary Report (04-27-24 @ 16:00):    No growth at 72 Hours    Culture - Blood (collected 04-24-24 @ 09:30)  Source: .Blood Blood-Peripheral  Gram Stain (04-25-24 @ 22:40):    Growth in aerobic bottle: Gram positive cocci in pairs  Final Report (04-26-24 @ 18:52):    Growth in aerobic bottle: Streptococcus mitis/oralis group    Alpha hemolytic streptoccous (Not Streptococcus pneumoniae or    Enterococcus)    isolated from a single blood culture sets may represent    contamination. Contact the Microbiology Department [phone] if    susceptibility testing is    Direct identification is available within approximately 3-5    hours either by Blood Panel Multiplexed PCR or Direct    MALDI-TOF. Details: [email or web address]  Organism: Blood Culture PCR (04-26-24 @ 18:52)  Organism: Blood Culture PCR (04-26-24 @ 18:52)      Method Type: PCR      -  Streptococcus sp. (Not Grp A, B or S pneumoniae): Detec          Radiology: reviewed

## 2024-04-27 NOTE — DISCHARGE NOTE PROVIDER - NSDCCPCAREPLAN_GEN_ALL_CORE_FT
PRINCIPAL DISCHARGE DIAGNOSIS  Diagnosis: Acute febrile illness  Assessment and Plan of Treatment: You were admitted for an acute febrile illness. Chest xray was negative. Flu and COVID tests were negative. Blood and urine cultures were collected, however they were not yet resulted in the lab. Urinalysis showed moderate bacteria. IV antibiotics and IV fluids were started with clinical improvement. Infectious Disease saw you and recommended IV antibiotics to be switched to oral antibiotics.  START Vantin 200mg twice daily for 7 days.   FOLLOW UP with Infectious Disease within 1 week of discharge. Follow up information is provided in this paperwork.      SECONDARY DISCHARGE DIAGNOSES  Diagnosis: Hyperglycemia  Assessment and Plan of Treatment: You were incidentally noted to have elevated blood sugar during the hospitalization.   FOLLOW UP with PCP within 1 week of discharge for further evaluation.     PRINCIPAL DISCHARGE DIAGNOSIS  Diagnosis: Acute febrile illness  Assessment and Plan of Treatment: You were admitted for an acute febrile illness. Chest xray was negative. Flu and COVID tests were negative. Blood and urine cultures were collected, however they were not yet resulted in the lab. Urinalysis showed moderate bacteria. IV antibiotics and IV fluids were started with clinical improvement. Infectious Disease saw you and recommended IV antibiotics to be switched to oral antibiotics.  START Vantin 200mg twice daily for 7 days.   FOLLOW UP with Infectious Disease within 1 week of discharge. Follow up information is provided in this paperwork.  SEEK IMMEDIATE MEDICAL CARE IF YOU HAVE ANY OF THE FOLLOWING SYMPTOMS: severe back or abdominal pain, fever, inability to keep fluids or medicine down, dizziness/lightheadedness, or a change in mental status.        SECONDARY DISCHARGE DIAGNOSES  Diagnosis: Hyperglycemia  Assessment and Plan of Treatment: You were incidentally noted to have elevated blood sugar during the hospitalization.   FOLLOW UP with PCP within 1 week of discharge for further evaluation.     PRINCIPAL DISCHARGE DIAGNOSIS  Diagnosis: Acute febrile illness  Assessment and Plan of Treatment: You were admitted for an acute febrile illness. ? Etiology   -Possible UTI   -Chest xray was negative. Flu and COVID tests were negative  -Blood and urine cultures were collected, however they were not yet resulted in the lab.  - Urinalysis showed moderate bacteria. IV antibiotics and IV fluids were started with clinical improvement.  - Infectious Disease DR NAHID Couch  saw you and recommended IV antibiotics to be switched to oral antibiotics.  START Vantin 200mg twice daily for 7 days. start this evening   -CBC on Monday with ID, Follow up your Blood culture & Urine culture results with DR NAHID Couch -ID  FOLLOW UP with Infectious Disease within 1 week of discharge.   -Follow up information is provided in this paperwork.  SEEK IMMEDIATE MEDICAL CARE IF YOU HAVE ANY OF THE FOLLOWING SYMPTOMS: severe back or abdominal pain, fever, inability to keep fluids or medicine down,  HA ,dizziness/lightheadedness, or a change in mental status---> You will need to return Back to ER/Hospital         SECONDARY DISCHARGE DIAGNOSES  Diagnosis: History of seizure disorder  Assessment and Plan of Treatment: On Keppra 750 mg 1 tab 2x day  NO Driving for 6 months  Follow up with neurology next week    Diagnosis: H/O: CVA (cerebrovascular accident)  Assessment and Plan of Treatment: Incidental findingson MRI Brain  EC ASA 81 mg daily  Statin Daily      Diagnosis: GERD (gastroesophageal reflux disease)  Assessment and Plan of Treatment: On PPI daily    Diagnosis: BPH (benign prostatic hyperplasia)  Assessment and Plan of Treatment: Continue Home meds    Diagnosis: Hyperglycemia  Assessment and Plan of Treatment: You were incidentally noted to have elevated blood sugar during the hospitalization.   Check fasting Glucose or HBA1c as out pt with PMD next week  FOLLOW UP with PCP within 1 week of discharge for further evaluation.     PRINCIPAL DISCHARGE DIAGNOSIS  Diagnosis: Acute febrile illness  Assessment and Plan of Treatment: You were admitted for an acute febrile illness. ? Etiology   -Possible UTI , Leukocytosis- WBC ~15.7  -Chest xray was negative. Flu and COVID Swab  tests were negative  -Blood and urine cultures were collected, however they were not yet resulted in the lab.  - Urinalysis showed moderate bacteria. IV antibiotics and IV fluids were started with clinical improvement.  - Infectious Disease DR NAHID Couch  saw you and recommended IV antibiotics to be switched to oral antibiotics.  START Vantin 200mg twice daily for 7 days. start this evening   -CBC on Monday with ID, Follow up your Blood culture & Urine culture results with DR NAHID Couch -ID  FOLLOW UP with Infectious Disease within 1 week of discharge.   -Follow up information is provided in this paperwork.  SEEK IMMEDIATE MEDICAL CARE IF YOU HAVE ANY OF THE FOLLOWING SYMPTOMS: severe back or abdominal pain, fever, inability to keep fluids or medicine down,  HA ,dizziness/lightheadedness, or a change in mental status---> You will need to return Back to ER/Hospital         SECONDARY DISCHARGE DIAGNOSES  Diagnosis: History of seizure disorder  Assessment and Plan of Treatment: -Recently Diagnosed  with Seizure DO  On Keppra 750 mg 1 tab 2x day  NO Driving for 6 months  Follow up with neurology next week    Diagnosis: H/O: CVA (cerebrovascular accident)  Assessment and Plan of Treatment: Incidental findingson MRI Brain  EC ASA 81 mg daily  Statin Daily      Diagnosis: GERD (gastroesophageal reflux disease)  Assessment and Plan of Treatment: On PPI daily    Diagnosis: BPH (benign prostatic hyperplasia)  Assessment and Plan of Treatment: Continue Home meds    Diagnosis: Hyperglycemia  Assessment and Plan of Treatment: You were incidentally noted to have elevated blood sugar during the hospitalization.   Check fasting Glucose or HBA1c as out pt with PMD next week  FOLLOW UP with PCP within 1 week of discharge for further evaluation.     PRINCIPAL DISCHARGE DIAGNOSIS  Diagnosis: Acute febrile illness  Assessment and Plan of Treatment: You were admitted for an acute febrile illness. ? Etiology   -Possible UTI , Leukocytosis- WBC ~15.7  -Chest xray was negative. Flu and COVID Swab  tests were negative  -Blood and urine cultures were collected, however they were not yet resulted in the lab.  - Urinalysis showed moderate bacteria. IV antibiotics and IV fluids were started with clinical improvement.  - Infectious Disease DR NAHID Couch  saw you and recommended IV antibiotics to be switched to oral antibiotics.  START Vantin 200mg twice daily for 7 days. start this evening   -CBC on Monday with ID, Follow up your Blood culture & Urine culture results with DR NAHID Couch -ID  FOLLOW UP with Infectious Disease within 1 week of discharge.   -Follow up information is provided in this paperwork.  FOLLOW UP with orthopaedics within 3 days of discharge.  - Follow up information is provided in this paperwork  - You will need further imaging done outpatient in order to rule out certain etiologies  SEEK IMMEDIATE MEDICAL CARE IF YOU HAVE ANY OF THE FOLLOWING SYMPTOMS: severe back or abdominal pain, fever, inability to keep fluids or medicine down,  HA ,dizziness/lightheadedness, or a change in mental status---> You will need to return Back to ER/Hospital         SECONDARY DISCHARGE DIAGNOSES  Diagnosis: Hyperglycemia  Assessment and Plan of Treatment: You were incidentally noted to have elevated blood sugar during the hospitalization.   Check fasting Glucose or HBA1c as out pt with PMD next week  FOLLOW UP with PCP within 1 week of discharge for further evaluation.    Diagnosis: History of seizure disorder  Assessment and Plan of Treatment: -Recently Diagnosed  with Seizure DO  On Keppra 750 mg 1 tab 2x day  NO Driving for 6 months  Follow up with neurology next week    Diagnosis: H/O: CVA (cerebrovascular accident)  Assessment and Plan of Treatment: Incidental findingson MRI Brain  EC ASA 81 mg daily  Statin Daily      Diagnosis: BPH (benign prostatic hyperplasia)  Assessment and Plan of Treatment: Continue Home meds    Diagnosis: GERD (gastroesophageal reflux disease)  Assessment and Plan of Treatment: On PPI daily    Diagnosis: Imaging abnormality  Assessment and Plan of Treatment: CT scan shows evidence of 2.0 cm exophytic mass arising from the posterior aspect of the gastric antrum, suspicious for a gastrointestinal stromal tumor (GIST).  There is masslike enhancement of the right lateral prostate gland, which could be due to an underlying prostate mass.  You need to follow up with your outpatient doctor for further recommendations for this condition.     PRINCIPAL DISCHARGE DIAGNOSIS  Diagnosis: Acute febrile illness  Assessment and Plan of Treatment: You were admitted for an acute febrile illness.   -Possible UTI/ Cystitis  , Leukocytosis- Resolved   -Chest xray was negative. Flu and COVID Swab  tests were negative  -Blood cultures x 2 NGTD  and urine cultures -NEG  -  - Infectious Disease DR NAHID Couch  saw you and recommended IV antibiotics to be switched to oral antibiotics.  START Vantin 200mg twice daily for 5  more  days.  1st dose start this evening .  FOLLOW UP with orthopaedics within 3 days of discharge. For MRI L Spine wit & with out Contrast  as out pt  - Follow up information is provided in this paperwork  SEEK IMMEDIATE MEDICAL CARE IF YOU HAVE ANY OF THE FOLLOWING SYMPTOMS: severe back or abdominal pain, fever, inability to keep fluids or medicine down,  HA ,dizziness/lightheadedness, or a change in mental status---> You will need to return Back to ER/Hospital         SECONDARY DISCHARGE DIAGNOSES  Diagnosis: History of seizure disorder  Assessment and Plan of Treatment: -Recently Diagnosed  with Seizure DO  On Keppra 750 mg 1 tab 2x day  NO Driving for 6 months  Follow up with neurology next week    Diagnosis: Imaging abnormality  Assessment and Plan of Treatment: CT scan shows evidence of 2.0 cm exophytic mass arising from the posterior aspect of the gastric antrum, suspicious for a gastrointestinal stromal tumor (GIST).  LIVER: There are a few low-attenuation lesions within the liver with   dominant lesions including a 1.6 cm adjacent to the gallbladder fossa   (301-91), a 1.4 cm lesion within segment 7 (301-84) and 1.5 cm   low-attenuation lesion within segment 8 (301-75).    There is masslike enhancement of the right lateral prostate gland, which could be due to an underlying prostate mass.  You need to follow up with your outpatient doctor for further recommendations for this condition.  Nephrolithiasis   Follow up with GI in 2 weeks  Follow up with Urologist in 2 weeks -Get PSA    Diagnosis: Abnormal MRI, lumbar spine  Assessment and Plan of Treatment: MRI L Spine with IV Contrast   Postop changes are identified.  Small collections are suspected in the posterior paraspinal soft tissue   region at the postop L2 and L3 levels. Clinical correlation and continued   close interval follow-up is recommended.      Diagnosis: H/O: CVA (cerebrovascular accident)  Assessment and Plan of Treatment: Incidental findingson MRI Brain  EC ASA 81 mg daily  Statin Daily      Diagnosis: BPH (benign prostatic hyperplasia)  Assessment and Plan of Treatment: Continue Home meds    Diagnosis: GERD (gastroesophageal reflux disease)  Assessment and Plan of Treatment: On PPI daily

## 2024-04-27 NOTE — ED ADULT NURSE REASSESSMENT NOTE - NS ED NURSE REASSESS COMMENT FT1
Spoke to family at this time informed them that we have a reserve room for him and we are just waiting for the doctors order to be in system.

## 2024-04-27 NOTE — H&P ADULT - PROBLEM SELECTOR PLAN 2
On previous admission was found to have chronic lacunar infarct on MRI  - c/w aspirin 81mg daily and atorvastatin 40mg daily

## 2024-04-27 NOTE — H&P ADULT - RESPIRATORY
normal/clear to auscultation bilaterally/no wheezes/no rales/no rhonchi/breath sounds equal/good air movement normal/clear to auscultation bilaterally/no wheezes/no rales/no rhonchi/no respiratory distress/breath sounds equal/good air movement/respirations non-labored

## 2024-04-27 NOTE — H&P ADULT - NEGATIVE MUSCULOSKELETAL SYMPTOMS
no arthralgia/no arthritis no arthralgia/no arthritis/no joint swelling/no muscle weakness/no stiffness/no neck pain/no back pain/no leg pain L/no leg pain R

## 2024-04-27 NOTE — DISCHARGE NOTE PROVIDER - NPI NUMBER (FOR SYSADMIN USE ONLY) :
[[id number]],[[id number]] [[id number]],[[id number]],[[id number]] [[id number]],[[id number]],[[id number]]

## 2024-04-27 NOTE — H&P ADULT - CARDIOVASCULAR
normal/regular rate and rhythm/S1 S2 present/no gallops/no rub/no murmur details… normal/regular rate and rhythm/S1 S2 present/no gallops/no rub/no murmur/no JVD/no pedal edema/vascular

## 2024-04-27 NOTE — H&P ADULT - GASTROINTESTINAL
normal/soft/nontender/nondistended/normal active bowel sounds details… normal/soft/nontender/nondistended/normal active bowel sounds/no guarding/no rigidity/no organomegaly/no palpable william/no masses palpable

## 2024-04-27 NOTE — H&P ADULT - NEGATIVE NEUROLOGICAL SYMPTOMS
no weakness/no focal seizures/no syncope no weakness/no paresthesias/no focal seizures/no syncope/no tremors/no vertigo/no difficulty walking/no headache

## 2024-04-27 NOTE — H&P ADULT - PROBLEM SELECTOR PLAN 3
On previous admission, was diagnosed with new-onset seizure  - c/w Keppra 750mg BID On previous admission, was diagnosed with new-onset seizure-recently Diagnosed   - c/w Keppra 750mg BID  Out pt Holter Monitor x 2 weeks as out pt with Cardiologist as out pt

## 2024-04-27 NOTE — DISCHARGE NOTE PROVIDER - NSDCFUADDINST_GEN_ALL_CORE_FT
You MUST follow up with ID-DR NAHID Couch for your Blood culture & urine Culture results, CBC on MONDAY at [facility] '  Follow up with Cardiologist/PMD  for out pt Holter monitoring   Follow up with Your Neurologist next week as schedule    You MUST follow up with Orthopedic Spine DR ADAM in 1-3 days for out pt Repeat MRI Lumbar  Spine without & with  IV Contrast for follow up for Collections   Follow up with Cardiologist/PMD  for out pt Holter monitoring , Repeat CBC,CMP in 1week  Follow up with Your Neurologist next week as schedule.  Follow up with your Gin  2 weeks -for abnormal CT A/P findings ? GIST  Follow up with Urology in 2 weeks for Abnormal Prostate findings  with Mass & PSA check as out pt

## 2024-04-27 NOTE — DISCHARGE NOTE NURSING/CASE MANAGEMENT/SOCIAL WORK - PATIENT PORTAL LINK FT
You can access the FollowMyHealth Patient Portal offered by [facility] by registering at the following website: [email or web address]. By joining [employer]’s FollowMyHealth portal, you will also be able to view your health information using other applications (apps) compatible with our system.

## 2024-04-27 NOTE — PROGRESS NOTE ADULT - PROBLEM SELECTOR PLAN 1
Meeting SIRS criteria with fever (T 100.8F), tachycardia  (), leukocytosis (WBC 13.58)  - On last admission (4/24), blood cultures with strep mitis/oralis in 1/4 bottles, likely contamination  - lactate wnl, procalcitonin elevated at 0.50   - COVID/flu/RSV negative  - CXR negative  - UA positive  - F/u urine culture   - F/u blood cultures  - maintenance IVF with NS at 75 cc/hr  - s/p Rocephin 2g x1 in ED  - continue Cefepime 2g q8h  - ID consulted (Dr. Liu), f/u recs Meeting SIRS criteria with fever (T 100.8F), tachycardia  (), leukocytosis (WBC 13.58)  - On last admission (4/24), blood cultures with strep mitis/oralis in 1/4 bottles, likely contamination  - lactate wnl, procalcitonin elevated at 0.50   - COVID/flu/RSV negative  - CXR negative  - UA positive  - F/u urine culture   - F/u blood cultures x 2   - maintenance IVF with NS at 75 cc/hr  - s/p Rocephin 2g x1 in ED  - continue Cefepime 2g IVPB q8h  - ID consulted (Dr. NAHID Couch d/w pt & Family at [facility]. Meeting SIRS criteria with fever (T 100.8F), tachycardia  (), leukocytosis (WBC 13.58)  - On last admission (4/24), blood cultures with strep mitis/oralis in 1/4 bottles, likely contamination  - lactate wnl, procalcitonin elevated at 0.50   - COVID/flu/RSV negative  - f/u RVP  - CXR negative  - UA positive  - F/u urine culture   - F/u blood cultures x 2   - maintenance IVF with NS at 75 cc/hr  - s/p Rocephin 2g x1 in ED  - continue Cefepime 2g IVPB q8h  - ID consulted (Dr. NAHID Couch) d/w pt & Family at [facility].

## 2024-04-27 NOTE — DISCHARGE NOTE PROVIDER - CARE PROVIDERS DIRECT ADDRESSES
,[email or web address]

## 2024-04-27 NOTE — PROGRESS NOTE ADULT - ASSESSMENT
68-year-old male with history of GERD, BPH presenting with fever to 102.8F at home and chills. Admitted for sepsis 2/2 UTI.  68-year-old male with history of GERD, BPH presenting with fever to 102.8F at home and chills. Admitted for sepsis 2/2 suspected UTI.

## 2024-04-27 NOTE — H&P ADULT - NEGATIVE GENERAL GENITOURINARY SYMPTOMS
no dysuria/normal urinary frequency no hematuria/no flank pain L/no flank pain R/no urine discoloration/no dysuria/no urinary hesitancy/normal urinary frequency

## 2024-04-27 NOTE — CASE MANAGEMENT PROGRESS NOTE - NSCMPROGRESSNOTE_GEN_ALL_CORE
CM met with patient and wife at bedside to discussed discharge disposition home.  However, floor nurse noted patient has a low grade fever. CM spoke to MD to make aware. DC cancel. CM remains available throughout hospital stay.

## 2024-04-27 NOTE — H&P ADULT - PROBLEM SELECTOR PLAN 5
Chronic:  - c/w omeprazole 20mg daily Chronic:  - c/w alfuzosin 10mg daily Chronic:  - c/w alfuzosin 10mg daily (interchange with tamsulosin)

## 2024-04-28 ENCOUNTER — RESULT REVIEW (OUTPATIENT)
Age: 69
End: 2024-04-28

## 2024-04-28 LAB
A1C WITH ESTIMATED AVERAGE GLUCOSE RESULT: 5.5 % — SIGNIFICANT CHANGE UP (ref 4–5.6)
ALBUMIN SERPL ELPH-MCNC: 3 G/DL — LOW (ref 3.3–5)
ALP SERPL-CCNC: 74 U/L — SIGNIFICANT CHANGE UP (ref 40–120)
ALT FLD-CCNC: 47 U/L — SIGNIFICANT CHANGE UP (ref 12–78)
ANION GAP SERPL CALC-SCNC: 7 MMOL/L — SIGNIFICANT CHANGE UP (ref 5–17)
AST SERPL-CCNC: 32 U/L — SIGNIFICANT CHANGE UP (ref 15–37)
BASOPHILS # BLD AUTO: 0.04 K/UL — SIGNIFICANT CHANGE UP (ref 0–0.2)
BASOPHILS NFR BLD AUTO: 0.3 % — SIGNIFICANT CHANGE UP (ref 0–2)
BILIRUB SERPL-MCNC: 0.8 MG/DL — SIGNIFICANT CHANGE UP (ref 0.2–1.2)
BUN SERPL-MCNC: 11 MG/DL — SIGNIFICANT CHANGE UP (ref 7–23)
CALCIUM SERPL-MCNC: 8.9 MG/DL — SIGNIFICANT CHANGE UP (ref 8.5–10.1)
CHLORIDE SERPL-SCNC: 110 MMOL/L — HIGH (ref 96–108)
CO2 SERPL-SCNC: 25 MMOL/L — SIGNIFICANT CHANGE UP (ref 22–31)
CREAT SERPL-MCNC: 0.83 MG/DL — SIGNIFICANT CHANGE UP (ref 0.5–1.3)
CULTURE RESULTS: SIGNIFICANT CHANGE UP
EGFR: 96 ML/MIN/1.73M2 — SIGNIFICANT CHANGE UP
EOSINOPHIL # BLD AUTO: 0.16 K/UL — SIGNIFICANT CHANGE UP (ref 0–0.5)
EOSINOPHIL NFR BLD AUTO: 1.2 % — SIGNIFICANT CHANGE UP (ref 0–6)
ESTIMATED AVERAGE GLUCOSE: 111 MG/DL — SIGNIFICANT CHANGE UP (ref 68–114)
GLUCOSE SERPL-MCNC: 114 MG/DL — HIGH (ref 70–99)
HCT VFR BLD CALC: 32.8 % — LOW (ref 39–50)
HGB BLD-MCNC: 11.1 G/DL — LOW (ref 13–17)
IMM GRANULOCYTES NFR BLD AUTO: 1 % — HIGH (ref 0–0.9)
LYMPHOCYTES # BLD AUTO: 1.14 K/UL — SIGNIFICANT CHANGE UP (ref 1–3.3)
LYMPHOCYTES # BLD AUTO: 8.8 % — LOW (ref 13–44)
MCHC RBC-ENTMCNC: 28.5 PG — SIGNIFICANT CHANGE UP (ref 27–34)
MCHC RBC-ENTMCNC: 33.8 GM/DL — SIGNIFICANT CHANGE UP (ref 32–36)
MCV RBC AUTO: 84.1 FL — SIGNIFICANT CHANGE UP (ref 80–100)
MONOCYTES # BLD AUTO: 1.39 K/UL — HIGH (ref 0–0.9)
MONOCYTES NFR BLD AUTO: 10.8 % — SIGNIFICANT CHANGE UP (ref 2–14)
NEUTROPHILS # BLD AUTO: 10.05 K/UL — HIGH (ref 1.8–7.4)
NEUTROPHILS NFR BLD AUTO: 77.9 % — HIGH (ref 43–77)
NRBC # BLD: 0 /100 WBCS — SIGNIFICANT CHANGE UP (ref 0–0)
PLATELET # BLD AUTO: 188 K/UL — SIGNIFICANT CHANGE UP (ref 150–400)
POTASSIUM SERPL-MCNC: 3.4 MMOL/L — LOW (ref 3.5–5.3)
POTASSIUM SERPL-SCNC: 3.4 MMOL/L — LOW (ref 3.5–5.3)
PROT SERPL-MCNC: 6.4 G/DL — SIGNIFICANT CHANGE UP (ref 6–8.3)
RBC # BLD: 3.9 M/UL — LOW (ref 4.2–5.8)
RBC # FLD: 12.8 % — SIGNIFICANT CHANGE UP (ref 10.3–14.5)
SODIUM SERPL-SCNC: 142 MMOL/L — SIGNIFICANT CHANGE UP (ref 135–145)
SPECIMEN SOURCE: SIGNIFICANT CHANGE UP
WBC # BLD: 12.91 K/UL — HIGH (ref 3.8–10.5)
WBC # FLD AUTO: 12.91 K/UL — HIGH (ref 3.8–10.5)

## 2024-04-28 PROCEDURE — 74177 CT ABD & PELVIS W/CONTRAST: CPT | Mod: 26

## 2024-04-28 PROCEDURE — 71260 CT THORAX DX C+: CPT | Mod: 26

## 2024-04-28 PROCEDURE — 93306 TTE W/DOPPLER COMPLETE: CPT | Mod: 26

## 2024-04-28 RX ORDER — POTASSIUM CHLORIDE 20 MEQ
40 PACKET (EA) ORAL ONCE
Refills: 0 | Status: COMPLETED | OUTPATIENT
Start: 2024-04-28 | End: 2024-04-28

## 2024-04-28 RX ORDER — IOHEXOL 300 MG/ML
30 INJECTION, SOLUTION INTRAVENOUS ONCE
Refills: 0 | Status: COMPLETED | OUTPATIENT
Start: 2024-04-28 | End: 2024-04-28

## 2024-04-28 RX ADMIN — PANTOPRAZOLE SODIUM 40 MILLIGRAM(S): 20 TABLET, DELAYED RELEASE ORAL at 05:51

## 2024-04-28 RX ADMIN — TAMSULOSIN HYDROCHLORIDE 0.8 MILLIGRAM(S): 0.4 CAPSULE ORAL at 23:07

## 2024-04-28 RX ADMIN — CEFEPIME 100 MILLIGRAM(S): 1 INJECTION, POWDER, FOR SOLUTION INTRAMUSCULAR; INTRAVENOUS at 23:08

## 2024-04-28 RX ADMIN — CEFEPIME 100 MILLIGRAM(S): 1 INJECTION, POWDER, FOR SOLUTION INTRAMUSCULAR; INTRAVENOUS at 05:52

## 2024-04-28 RX ADMIN — Medication 81 MILLIGRAM(S): at 11:48

## 2024-04-28 RX ADMIN — CEFEPIME 100 MILLIGRAM(S): 1 INJECTION, POWDER, FOR SOLUTION INTRAMUSCULAR; INTRAVENOUS at 14:45

## 2024-04-28 RX ADMIN — Medication 1 TABLET(S): at 11:47

## 2024-04-28 RX ADMIN — LEVETIRACETAM 750 MILLIGRAM(S): 250 TABLET, FILM COATED ORAL at 05:52

## 2024-04-28 RX ADMIN — Medication 40 MILLIEQUIVALENT(S): at 17:28

## 2024-04-28 RX ADMIN — ATORVASTATIN CALCIUM 40 MILLIGRAM(S): 80 TABLET, FILM COATED ORAL at 23:08

## 2024-04-28 RX ADMIN — ENOXAPARIN SODIUM 40 MILLIGRAM(S): 100 INJECTION SUBCUTANEOUS at 11:47

## 2024-04-28 RX ADMIN — LEVETIRACETAM 750 MILLIGRAM(S): 250 TABLET, FILM COATED ORAL at 17:28

## 2024-04-28 RX ADMIN — Medication 650 MILLIGRAM(S): at 05:51

## 2024-04-28 RX ADMIN — Medication 650 MILLIGRAM(S): at 06:21

## 2024-04-28 RX ADMIN — IOHEXOL 30 MILLILITER(S): 300 INJECTION, SOLUTION INTRAVENOUS at 15:31

## 2024-04-28 NOTE — PROGRESS NOTE ADULT - PROBLEM SELECTOR PLAN 2
On previous admission was found to have chronic lacunar infarct on MRI  - c/w aspirin 81mg daily and atorvastatin 40mg daily On previous admission was found to have chronic lacunar infarct on MRI  - c/w aspirin 81mg daily and atorvastatin 40mg daily  Out pt Neurology follow up

## 2024-04-28 NOTE — PROGRESS NOTE ADULT - ASSESSMENT
68-year-old male with history of GERD, BPH presenting with fever to 102.8F at home and chills. Admitted for sepsis 2/2 suspected UTI.

## 2024-04-28 NOTE — PROGRESS NOTE ADULT - SUBJECTIVE AND OBJECTIVE BOX
[facility], Division of Infectious Diseases  BRITTANIE Sterling Y. Patel, S. Shah, G. [facility]  [phone]    Name: GABE BLOOD  Age: 68y  Gender: Male  MRN: [id number]    Interval History:  Patient seen and examined at bedside   No acute overnight events.   febrile Tm 100.7 this AM  Notes reviewed    Antibiotics:  cefepime   IVPB 2000 milliGRAM(s) IV Intermittent every 8 hours      Medications:  acetaminophen     Tablet .. 650 milliGRAM(s) Oral every 6 hours PRN  aspirin  chewable 81 milliGRAM(s) Oral daily  atorvastatin 40 milliGRAM(s) Oral at bedtime  cefepime   IVPB 2000 milliGRAM(s) IV Intermittent every 8 hours  enoxaparin Injectable 40 milliGRAM(s) SubCutaneous every 24 hours  iohexol 300 mG (iodine)/mL Oral Solution 30 milliLiter(s) Oral once  levETIRAcetam 750 milliGRAM(s) Oral two times a day  multivitamin 1 Tablet(s) Oral daily  pantoprazole    Tablet 40 milliGRAM(s) Oral before breakfast  sodium chloride 0.9%. 1000 milliLiter(s) IV Continuous <Continuous>  tamsulosin 0.8 milliGRAM(s) Oral at bedtime      Review of Systems:  A 10-point review of systems was obtained.   Review of systems otherwise negative except as previously noted.    Allergies: penicillin (Rash)  penicillin (Hives)    For details regarding the patient's past medical history, social history, family history, and other miscellaneous elements, please refer the initial infectious diseases consultation and/or the admitting history and physical examination for this admission.    Objective:  Vitals:   T(C): 36.6 (04-28-24 @ 13:12), Max: 38.2 (04-28-24 @ 05:25)  HR: 78 (04-28-24 @ 13:12) (77 - 86)  BP: 154/79 (04-28-24 @ 13:12) (128/77 - 154/79)  RR: 18 (04-28-24 @ 13:12) (18 - 19)  SpO2: 95% (04-28-24 @ 13:12) (95% - 99%)    Physical Examination:  General: no acute distress  HEENT: NC/AT, EOMI,  Cardio: RRR  Resp: decreased breath sounds  Abd: soft, NT, ND  Back: midline scar  Ext: no edema or cyanosis  Skin: warm, dry, no visible rash      Laboratory Studies:  CBC:                       11.1   12.91 )-----------( 188      ( 28 Apr 2024 07:12 )             32.8     CMP: 04-28    142  |  110<H>  |  11  ----------------------------<  114<H>  3.4<L>   |  25  |  0.83    Ca    8.9      28 Apr 2024 07:12    TPro  6.4  /  Alb  3.0<L>  /  TBili  0.8  /  DBili  x   /  AST  32  /  ALT  47  /  AlkPhos  74  04-28    LIVER FUNCTIONS - ( 28 Apr 2024 07:12 )  Alb: 3.0 g/dL / Pro: 6.4 g/dL / ALK PHOS: 74 U/L / ALT: 47 U/L / AST: 32 U/L / GGT: x           Urinalysis Basic - ( 28 Apr 2024 07:12 )    Color: x / Appearance: x / SG: x / pH: x  Gluc: 114 mg/dL / Ketone: x  / Bili: x / Urobili: x   Blood: x / Protein: x / Nitrite: x   Leuk Esterase: x / RBC: x / WBC x   Sq Epi: x / Non Sq Epi: x / Bacteria: x        Microbiology: reviewed    Culture - Urine (collected 04-27-24 @ 01:00)  Source: Clean Catch Clean Catch (Midstream)  Final Report (04-28-24 @ 12:04):    <10,000 CFU/mL Normal Urogenital Dalia    Culture - Blood (collected 04-26-24 @ 22:30)  Source: .Blood Blood-Peripheral  Preliminary Report (04-28-24 @ 06:01):    No growth at 24 hours    Culture - Blood (collected 04-26-24 @ 22:15)  Source: .Blood Blood-Peripheral  Preliminary Report (04-28-24 @ 06:01):    No growth at 24 hours    Culture - Blood (collected 04-24-24 @ 09:38)  Source: .Blood Blood-Peripheral  Preliminary Report (04-27-24 @ 16:00):    No growth at 72 Hours    Culture - Blood (collected 04-24-24 @ 09:30)  Source: .Blood Blood-Peripheral  Gram Stain (04-25-24 @ 22:40):    Growth in aerobic bottle: Gram positive cocci in pairs  Final Report (04-26-24 @ 18:52):    Growth in aerobic bottle: Streptococcus mitis/oralis group    Alpha hemolytic streptoccous (Not Streptococcus pneumoniae or    Enterococcus)    isolated from a single blood culture sets may represent    contamination. Contact the Microbiology Department [phone] if    susceptibility testing is    Direct identification is available within approximately 3-5    hours either by Blood Panel Multiplexed PCR or Direct    MALDI-TOF. Details: [email or web address]  Organism: Blood Culture PCR (04-26-24 @ 18:52)  Organism: Blood Culture PCR (04-26-24 @ 18:52)      Method Type: PCR      -  Streptococcus sp. (Not Grp A, B or S pneumoniae): Detec          Radiology: reviewed

## 2024-04-28 NOTE — PROGRESS NOTE ADULT - ASSESSMENT
68-year-old male with history of GERD, BPH presenting with fever to 102.8F at home and chills. Admitted for sepsis 2/2 UTI.   Meeting SIRS criteria with fever (T 100.8F), tachycardia  (), leukocytosis (WBC 13.58)  UA from 4/24 compared to UA from 4/27; UTI appears to be new  BCx/UCx NGTD  --On last admission (4/24), blood cultures with strep mitis/oralis in 1/4 bottles, delayed growth likely contamination  Lactate wnl, procalcitonin elevated at 0.50   Leukocytosis now downtrending  COVID/flu/RSV negative; full RVP negative  CXR negative    Recommendations:   C/w cefepime  Repeat BCx ordered w/ AM labs  MRSA swab (ordered)  TTE (ordered)   CT CAP (ordered)  Trend temps/WBC  Further recs to follow    Of note pt did mention a recent back injection--more information to follow    D/w Dr. Couch  D/w pt, wife, daughter and daughter-in-law at bedside  Infectious Diseases will follow. Please call with any questions.  Nayely Couch M.D.  [facility] Division of Infectious Diseases [phone]  For after 5 P.M. and weekends, please call [phone]

## 2024-04-28 NOTE — PROGRESS NOTE ADULT - SUBJECTIVE AND OBJECTIVE BOX
Patient is a 68y old  Male who presents with a chief complaint of sepsis workup (27 Apr 2024 16:21)    HPI:  68-year-old male with history of recent new onset seizure 4/24/24 , past  small CVA with no residual symptoms, HLD, GERD, BPH presenting with fever to 102.8F. Pt was recently admitted to [facility] from 4/24-4/25 for suspected new onset seizure, Was seen by Dr. Puckett and was started on atorvastatin, aspirin, and Keppra. Since discharge, pt has been compliant with all medications. However today, pt was noted by family to appear weak associated with chills and some mild confusion. Also found be to febrile at home to 102.8F, at which point family brought pt back to the ED. Denies recent travel, recent illness, or sick contacts. Denies cough, shortness of breath, chest pain, abdominal pain, nausea, vomiting, diarrhea, constipation, pain or burning with urination.     ED course  VS: T 100.8F orally, , /68, RR 18, SpO2 94% RA  Labs: WBC 13.58, H/H 11.7/34.3, Ca 8.4, TBili 1.5, procalcitonin 0.50  Imaging:  - CXR clear on wet read  EKG: NSR HR 86, QTc 447  In ED given: Rocephin 2g x1, 2L NS bolus x1, OFIRMEV 1g x1   (27 Apr 2024 01:16)    INTERVAL HPI:  04/28: patient recently admitted.  seen at bedside.  Patient is aware of fever overnight.  Major complaint today is increased urinary frequency, prompting multiple trips to the bathroom at night.  Patient denies pain    OVERNIGHT EVENTS:  Fever overnight    Home Medications:  alfuzosin 10 mg oral tablet, extended release: 1 tab(s) orally once a day (27 Apr 2024 01:16)  Multiple Vitamins oral tablet: 1 tab(s) orally once a day (27 Apr 2024 01:16)  omeprazole 20 mg oral delayed release capsule: 1 cap(s) orally once a day (27 Apr 2024 01:16)      MEDICATIONS  (STANDING):  aspirin  chewable 81 milliGRAM(s) Oral daily  atorvastatin 40 milliGRAM(s) Oral at bedtime  cefepime   IVPB 2000 milliGRAM(s) IV Intermittent every 8 hours  enoxaparin Injectable 40 milliGRAM(s) SubCutaneous every 24 hours  iohexol 300 mG (iodine)/mL Oral Solution 30 milliLiter(s) Oral once  levETIRAcetam 750 milliGRAM(s) Oral two times a day  multivitamin 1 Tablet(s) Oral daily  pantoprazole    Tablet 40 milliGRAM(s) Oral before breakfast  sodium chloride 0.9%. 1000 milliLiter(s) (75 mL/Hr) IV Continuous <Continuous>  tamsulosin 0.8 milliGRAM(s) Oral at bedtime    MEDICATIONS  (PRN):  acetaminophen     Tablet .. 650 milliGRAM(s) Oral every 6 hours PRN Temp greater or equal to 38C (100.4F), Mild Pain (1 - 3)      Allergies    penicillin (Rash)  penicillin (Hives)    Intolerances        Social History:  Lives at home, employed  Ambulates without assistance  Denies tobacco use  Denies alcohol use  Denies illicit drug use (27 Apr 2024 01:16)      REVIEW OF SYSTEMS:  CONSTITUTIONAL: endorses fever, No chills, No fatigue, No myalgia, No Body ache, No Weakness  EYES: No eye pain,  No visual disturbances, No discharge, NO Redness  ENMT:  No ear pain, No nose bleed, No vertigo; No sinus pain, NO throat pain, No Congestion  NECK: No pain, No stiffness  RESPIRATORY: No cough, NO wheezing, No  hemoptysis, NO  shortness of breath  CARDIOVASCULAR: No chest pain, palpitations  GASTROINTESTINAL: No abdominal pain, NO epigastric pain. No nausea, No vomiting; No diarrhea, No constipation. [  ] BM  GENITOURINARY: No dysuria, increased frequency, No urgency, No hematuria, NO incontinence  NEUROLOGICAL: No headaches, No dizziness, No numbness, No tingling, No tremors, No weakness  EXT: No Swelling, No Pain, No Edema  SKIN:  [  ] No itching, burning, rashes, or lesions   MUSCULOSKELETAL: No joint pain ,No Jt swelling; No muscle pain, No back pain, No extremity pain  PSYCHIATRIC: No depression,  No anxiety,  No mood swings ,No difficulty sleeping at night  PAIN SCALE: [  x] None  [  ] Other-  ROS Unable to obtain due to - [  ] Dementia  [  ] Lethargy [  ] Drowsy [  ] Sedated [  ] non verbal  REST OF REVIEW Of SYSTEM - [ x ] Normal     Vital Signs Last 24 Hrs  T(C): 36.6 (28 Apr 2024 13:12), Max: 38.2 (28 Apr 2024 05:25)  T(F): 97.9 (28 Apr 2024 13:12), Max: 100.7 (28 Apr 2024 05:25)  HR: 78 (28 Apr 2024 13:12) (77 - 86)  BP: 154/79 (28 Apr 2024 13:12) (128/77 - 154/79)  BP(mean): --  RR: 18 (28 Apr 2024 13:12) (18 - 19)  SpO2: 95% (28 Apr 2024 13:12) (95% - 99%)    Parameters below as of 28 Apr 2024 13:12  Patient On (Oxygen Delivery Method): room air      Finger Stick        04-28 @ 07:01  -  04-28 @ 13:48  --------------------------------------------------------  IN: 750 mL / OUT: 0 mL / NET: 750 mL        PHYSICAL EXAM:  GENERAL:  [ x ] NAD , [  ] well appearing, [  ] Agitated, [  ] Drowsy,  [  ] Lethargy, [  ] confused   HEAD:  [ x ] Normal, [  ] Other  EYES:  [ x ] EOMI, [  ] PERRLA, [  ] conjunctiva and sclera clear normal, [  ] Other,  [  ] Pallor,[  ] Discharge  ENMT:  [  ] Normal, [  ] Moist mucous membranes, [  ] Good dentition, [  ] No Thrush  NECK:  [  ] Supple, [  ] No JVD, [  ] Normal thyroid, [  ] Lymphadenopathy [  ] Other  CHEST/LUNG:  [x  ] Clear to auscultation bilaterally, [  ] Breath Sounds equal B/L / Decrease, [  ] poor effort  [  ] No rales, [  ] No rhonchi  [  ]  No wheezing,   HEART:  [ x ] Regular rate and rhythm, [  ] tachycardia, [  ] Bradycardia,  [  ] irregular  [  ] No murmurs, No rubs, No gallops, [  ] PPM in place (Mfr:  )  ABDOMEN:  [ x ] Soft, [x  ] Nontender, [  ] Nondistended, [  ] No mass, [  ] Bowel sounds present, [  ] obese  NERVOUS SYSTEM:  [x  ] Alert & Oriented X3, [  ] Nonfocal  [  ] Confusion  [  ] Encephalopathic [  ] Sedated [  ] Unable to assess, [  ] Dementia [  ] Other-  EXTREMITIES: [  ] 2+ Peripheral Pulses, No clubbing, No cyanosis,  [  ] edema B/L lower EXT. [  ] PVD stasis skin changes B/L Lower EXT, [  ] wound  LYMPH: No lymphadenopathy noted  SKIN:  [ x ] No rashes or lesions, [  ] Pressure Ulcers, [  ] ecchymosis, [  ] Skin Tears, [  ] Other    DIET: Diet, Regular (04-27-24 @ 01:09)      LABS:                        11.1   12.91 )-----------( 188      ( 28 Apr 2024 07:12 )             32.8     28 Apr 2024 07:12    142    |  110    |  11     ----------------------------<  114    3.4     |  25     |  0.83     Ca    8.9        28 Apr 2024 07:12    TPro  6.4    /  Alb  3.0    /  TBili  0.8    /  DBili  x      /  AST  32     /  ALT  47     /  AlkPhos  74     28 Apr 2024 07:12    PT/INR - ( 26 Apr 2024 22:15 )   PT: 13.3 sec;   INR: 1.14 ratio         PTT - ( 26 Apr 2024 22:15 )  PTT:26.3 sec  Urinalysis Basic - ( 28 Apr 2024 07:12 )    Color: x / Appearance: x / SG: x / pH: x  Gluc: 114 mg/dL / Ketone: x  / Bili: x / Urobili: x   Blood: x / Protein: x / Nitrite: x   Leuk Esterase: x / RBC: x / WBC x   Sq Epi: x / Non Sq Epi: x / Bacteria: x        Culture Results:   <10,000 CFU/mL Normal Urogenital Dalia (04-27 @ 01:00)  Culture Results:   No growth at 24 hours (04-26 @ 22:30)  Culture Results:   No growth at 24 hours (04-26 @ 22:15)  Culture Results:   No growth at 72 Hours (04-24 @ 09:38)  Culture Results:   Growth in aerobic bottle: Streptococcus mitis/oralis group  Alpha hemolytic streptoccous (Not Streptococcus pneumoniae or  Enterococcus)  isolated from a single blood culture sets may represent  contamination. Contact the Microbiology Department [phone] if  susceptibility testing is  Direct identification is available within approximately 3-5  hours either by Blood Panel Multiplexed PCR or Direct  MALDI-TOF. Details: [email or web address] (04-24 @ 09:30)      culture blood  -- Clean Catch Clean Catch (Midstream) 04-27 @ 01:00    culture urine  --  04-27 @ 01:00  culture blood  -- .Blood Blood-Peripheral 04-26 @ 22:30    culture urine  --  04-26 @ 22:30  culture blood  -- .Blood Blood-Peripheral 04-26 @ 22:15    culture urine  --  04-26 @ 22:15      CARDIAC MARKERS ( 24 Apr 2024 16:13 )  x     / x     / 226 U/L / x     / x              Culture - Urine (collected 27 Apr 2024 01:00)  Source: Clean Catch Clean Catch (Midstream)  Final Report (28 Apr 2024 12:04):    <10,000 CFU/mL Normal Urogenital Dalia    Culture - Blood (collected 26 Apr 2024 22:30)  Source: .Blood Blood-Peripheral  Preliminary Report (28 Apr 2024 06:01):    No growth at 24 hours    Culture - Blood (collected 26 Apr 2024 22:15)  Source: .Blood Blood-Peripheral  Preliminary Report (28 Apr 2024 06:01):    No growth at 24 hours    Culture - Blood (collected 24 Apr 2024 09:38)  Source: .Blood Blood-Peripheral  Preliminary Report (27 Apr 2024 16:00):    No growth at 72 Hours    Culture - Blood (collected 24 Apr 2024 09:30)  Source: .Blood Blood-Peripheral  Gram Stain (25 Apr 2024 22:40):    Growth in aerobic bottle: Gram positive cocci in pairs  Final Report (26 Apr 2024 18:52):    Growth in aerobic bottle: Streptococcus mitis/oralis group    Alpha hemolytic streptoccous (Not Streptococcus pneumoniae or    Enterococcus)    isolated from a single blood culture sets may represent    contamination. Contact the Microbiology Department [phone] if    susceptibility testing is    Direct identification is available within approximately 3-5    hours either by Blood Panel Multiplexed PCR or Direct    MALDI-TOF. Details: [email or web address]  Organism: Blood Culture PCR (26 Apr 2024 18:52)  Organism: Blood Culture PCR (26 Apr 2024 18:52)         Anemia Panel:      Thyroid Panel:                RADIOLOGY & ADDITIONAL TESTS:      HEALTH ISSUES - PROBLEM Dx:  Systemic inflammatory response syndrome (SIRS)    H/O: CVA (cerebrovascular accident)    History of seizure disorder    BPH (benign prostatic hyperplasia)    GERD (gastroesophageal reflux disease)    Need for prophylactic measure    HLD (hyperlipidemia)            Consultant(s) Notes Reviewed:  [x  ] YES     Care Discussed with [X] Consultants  [  x] Patient  [  ] Family [  ] HCP [  ]   [  ] Social Service  [  ] RN, [  ] Physical Therapy,[  ] Palliative care team  DVT PPX: [  ] Lovenox, [  ] S C Heparin, [  ] Coumadin, [  ] Xarelto, [  ] Eliquis, [  ] Pradaxa, [  ] IV Heparin drip, [  ] SCD [  ] Contraindication 2 to GI Bleed,[  ] Ambulation [  ] Contraindicated 2 to  bleed [  ] Contraindicated 2 to Brain Bleed  Advanced directive: [  ] None, [  ] DNR/DNI Patient is a 68y old  Male who presents with a chief complaint of sepsis workup (27 Apr 2024 16:21)    HPI:  68-year-old male with history of recent new onset seizure 4/24/24 , past  small CVA with no residual symptoms, HLD, GERD, BPH presenting with fever to 102.8F. Pt was recently admitted to [facility] from 4/24-4/25 for suspected new onset seizure, Was seen by Dr. Puckett and was started on atorvastatin, aspirin, and Keppra. Since discharge, pt has been compliant with all medications. However today, pt was noted by family to appear weak associated with chills and some mild confusion. Also found be to febrile at home to 102.8F, at which point family brought pt back to the ED. Denies recent travel, recent illness, or sick contacts. Denies cough, shortness of breath, chest pain, abdominal pain, nausea, vomiting, diarrhea, constipation, pain or burning with urination.     ED course  VS: T 100.8F orally, , /68, RR 18, SpO2 94% RA  Labs: WBC 13.58, H/H 11.7/34.3, Ca 8.4, TBili 1.5, procalcitonin 0.50  Imaging:  - CXR clear on wet read  EKG: NSR HR 86, QTc 447  In ED given: Rocephin 2g x1, 2L NS bolus x1, OFIRMEV 1g x1   (27 Apr 2024 01:16)    INTERVAL HPI:  04/28: patient recently admitted.  seen at bedside.  Patient is aware of fever overnight.  Major complaint today is increased urinary frequency, prompting multiple trips to the bathroom at night.  Patient denies pain, fever, Leukocytosis improving    OVERNIGHT EVENTS:  Fever overnight    Home Medications:  alfuzosin 10 mg oral tablet, extended release: 1 tab(s) orally once a day (27 Apr 2024 01:16)  Multiple Vitamins oral tablet: 1 tab(s) orally once a day (27 Apr 2024 01:16)  omeprazole 20 mg oral delayed release capsule: 1 cap(s) orally once a day (27 Apr 2024 01:16)      MEDICATIONS  (STANDING):  aspirin  chewable 81 milliGRAM(s) Oral daily  atorvastatin 40 milliGRAM(s) Oral at bedtime  cefepime   IVPB 2000 milliGRAM(s) IV Intermittent every 8 hours  enoxaparin Injectable 40 milliGRAM(s) SubCutaneous every 24 hours  iohexol 300 mG (iodine)/mL Oral Solution 30 milliLiter(s) Oral once  levETIRAcetam 750 milliGRAM(s) Oral two times a day  multivitamin 1 Tablet(s) Oral daily  pantoprazole    Tablet 40 milliGRAM(s) Oral before breakfast  sodium chloride 0.9%. 1000 milliLiter(s) (75 mL/Hr) IV Continuous <Continuous>  tamsulosin 0.8 milliGRAM(s) Oral at bedtime    MEDICATIONS  (PRN):  acetaminophen     Tablet .. 650 milliGRAM(s) Oral every 6 hours PRN Temp greater or equal to 38C (100.4F), Mild Pain (1 - 3)      Allergies    penicillin (Rash)  penicillin (Hives)    Intolerances        Social History:  Lives at home, employed  Ambulates without assistance  Denies tobacco use  Denies alcohol use  Denies illicit drug use (27 Apr 2024 01:16)      REVIEW OF SYSTEMS: i feel better   CONSTITUTIONAL: endorses fever, No chills, No fatigue, No myalgia, No Body ache, No Weakness  EYES: No eye pain,  No visual disturbances, No discharge, NO Redness  ENMT:  No ear pain, No nose bleed, No vertigo; No sinus pain, NO throat pain, No Congestion  NECK: No pain, No stiffness  RESPIRATORY: No cough, NO wheezing, No  hemoptysis, NO  shortness of breath  CARDIOVASCULAR: No chest pain, palpitations  GASTROINTESTINAL: No abdominal pain, NO epigastric pain. No nausea, No vomiting; No diarrhea, No constipation. [ x ] BM  GENITOURINARY: No dysuria, increased frequency, No urgency, No hematuria, NO incontinence  NEUROLOGICAL: No headaches, No dizziness, No numbness, No tingling, No tremors, No weakness  EXT: No Swelling, No Pain, No Edema  SKIN:  [  ] No itching, burning, rashes, or lesions   MUSCULOSKELETAL: No joint pain ,No Jt swelling; No muscle pain, No back pain, No extremity pain  PSYCHIATRIC: No depression,  No anxiety,  No mood swings ,No difficulty sleeping at night  PAIN SCALE: [  x] None  [  ] Other-  ROS Unable to obtain due to - [  ] Dementia  [  ] Lethargy [  ] Drowsy [  ] Sedated [  ] non verbal  REST OF REVIEW Of SYSTEM - [ x ] Normal     Vital Signs Last 24 Hrs  T(C): 36.6 (28 Apr 2024 13:12), Max: 38.2 (28 Apr 2024 05:25)  T(F): 97.9 (28 Apr 2024 13:12), Max: 100.7 (28 Apr 2024 05:25)  HR: 78 (28 Apr 2024 13:12) (77 - 86)  BP: 154/79 (28 Apr 2024 13:12) (128/77 - 154/79)  BP(mean): --  RR: 18 (28 Apr 2024 13:12) (18 - 19)  SpO2: 95% (28 Apr 2024 13:12) (95% - 99%)    Parameters below as of 28 Apr 2024 13:12  Patient On (Oxygen Delivery Method): room air      Finger Stick        04-28 @ 07:01  -  04-28 @ 13:48  --------------------------------------------------------  IN: 750 mL / OUT: 0 mL / NET: 750 mL        PHYSICAL EXAM:  GENERAL:  [ x ] NAD , [x  ] well appearing, [  ] Agitated, [  ] Drowsy,  [  ] Lethargy, [  ] confused   HEAD:  [ x ] Normal, [  ] Other  EYES:  [ x ] EOMI, [x  ] PERRLA, [ x ] conjunctiva and sclera clear normal, [  ] Other,  [  ] Pallor,[  ] Discharge  ENMT:  [x  ] Normal, [ x ] Moist mucous membranes, [  ] Good dentition, [ x ] No Thrush  NECK:  [ x ] Supple, [ x ] No JVD, [x  ] Normal thyroid, [  ] Lymphadenopathy [  ] Other  CHEST/LUNG:  [x  ] Clear to auscultation bilaterally, [ x ] Breath Sounds equal B/L / Decrease, [x  ] poor effort  [ x ] No rales, [ x ] No rhonchi  [ x ]  No wheezing,   HEART:  [ x ] Regular rate and rhythm, [  ] tachycardia, [  ] Bradycardia,  [  ] irregular  [x  ] No murmurs, No rubs, No gallops, [  ] PPM in place (Mfr:  )  ABDOMEN:  [ x ] Soft, [x  ] Nontender, [x ] Nondistended, [ x ] No mass, [x  ] Bowel sounds present, [  ] obese  NERVOUS SYSTEM:  [x  ] Alert & Oriented X3, [x  ] Nonfocal  [  ] Confusion  [  ] Encephalopathic [  ] Sedated [  ] Unable to assess, [  ] Dementia [  ] Other-  EXTREMITIES: [ x ] 2+ Peripheral Pulses, No clubbing, No cyanosis,  [  ] edema B/L lower EXT. [  ] PVD stasis skin changes B/L Lower EXT, [  ] wound  LYMPH: No lymphadenopathy noted  SKIN:  [ x ] No rashes or lesions, [  ] Pressure Ulcers, [  ] ecchymosis, [  ] Skin Tears, [  ] Other    DIET: Diet, Regular (04-27-24 @ 01:09)      LABS:                        11.1   12.91 )-----------( 188      ( 28 Apr 2024 07:12 )             32.8     28 Apr 2024 07:12    142    |  110    |  11     ----------------------------<  114    3.4     |  25     |  0.83     Ca    8.9        28 Apr 2024 07:12    TPro  6.4    /  Alb  3.0    /  TBili  0.8    /  DBili  x      /  AST  32     /  ALT  47     /  AlkPhos  74     28 Apr 2024 07:12    PT/INR - ( 26 Apr 2024 22:15 )   PT: 13.3 sec;   INR: 1.14 ratio         PTT - ( 26 Apr 2024 22:15 )  PTT:26.3 sec  Urinalysis Basic - ( 28 Apr 2024 07:12 )    Color: x / Appearance: x / SG: x / pH: x  Gluc: 114 mg/dL / Ketone: x  / Bili: x / Urobili: x   Blood: x / Protein: x / Nitrite: x   Leuk Esterase: x / RBC: x / WBC x   Sq Epi: x / Non Sq Epi: x / Bacteria: x        Culture Results:   <10,000 CFU/mL Normal Urogenital Dalia (04-27 @ 01:00)  Culture Results:   No growth at 24 hours (04-26 @ 22:30)  Culture Results:   No growth at 24 hours (04-26 @ 22:15)  Culture Results:   No growth at 72 Hours (04-24 @ 09:38)  Culture Results:   Growth in aerobic bottle: Streptococcus mitis/oralis group  Alpha hemolytic streptoccous (Not Streptococcus pneumoniae or  Enterococcus)  isolated from a single blood culture sets may represent  contamination. Contact the Microbiology Department [phone] if  susceptibility testing is  Direct identification is available within approximately 3-5  hours either by Blood Panel Multiplexed PCR or Direct  MALDI-TOF. Details: [email or web address] (04-24 @ 09:30)      culture blood  -- Clean Catch Clean Catch (Midstream) 04-27 @ 01:00    culture urine  --  04-27 @ 01:00  culture blood  -- .Blood Blood-Peripheral 04-26 @ 22:30    culture urine  --  04-26 @ 22:30  culture blood  -- .Blood Blood-Peripheral 04-26 @ 22:15    culture urine  --  04-26 @ 22:15      CARDIAC MARKERS ( 24 Apr 2024 16:13 )  x     / x     / 226 U/L / x     / x        Culture - Urine (collected 27 Apr 2024 01:00)  Source: Clean Catch Clean Catch (Midstream)  Final Report (28 Apr 2024 12:04):    <10,000 CFU/mL Normal Urogenital Dalia    Culture - Blood (collected 26 Apr 2024 22:30)  Source: .Blood Blood-Peripheral  Preliminary Report (28 Apr 2024 06:01):    No growth at 24 hours    Culture - Blood (collected 26 Apr 2024 22:15)  Source: .Blood Blood-Peripheral  Preliminary Report (28 Apr 2024 06:01):    No growth at 24 hours    Culture - Blood (collected 24 Apr 2024 09:38)  Source: .Blood Blood-Peripheral  Preliminary Report (27 Apr 2024 16:00):    No growth at 72 Hours    Culture - Blood (collected 24 Apr 2024 09:30)  Source: .Blood Blood-Peripheral  Gram Stain (25 Apr 2024 22:40):    Growth in aerobic bottle: Gram positive cocci in pairs  Final Report (26 Apr 2024 18:52):    Growth in aerobic bottle: Streptococcus mitis/oralis group    Alpha hemolytic streptoccous (Not Streptococcus pneumoniae or    Enterococcus)    isolated from a single blood culture sets may represent    contamination. Contact the Microbiology Department [phone] if    susceptibility testing is    Direct identification is available within approximately 3-5    hours either by Blood Panel Multiplexed PCR or Direct    MALDI-TOF. Details: [email or web address]  Organism: Blood Culture PCR (26 Apr 2024 18:52)  Organism: Blood Culture PCR (26 Apr 2024 18:52)         RADIOLOGY & ADDITIONAL TESTS: NONE      HEALTH ISSUES - PROBLEM Dx:  Systemic inflammatory response syndrome (SIRS)    H/O: CVA (cerebrovascular accident)    History of seizure disorder    BPH (benign prostatic hyperplasia)    GERD (gastroesophageal reflux disease)    Need for prophylactic measure    HLD (hyperlipidemia)            Consultant(s) Notes Reviewed:  [x  ] YES     Care Discussed with [X] Consultants  [  x] Patient  [  ] Family [  ] HCP [  ]   [  ] Social Service  [  ] RN, [  ] Physical Therapy,[  ] Palliative care team  DVT PPX: [x  ] Lovenox, [  ] S C Heparin, [  ] Coumadin, [  ] Xarelto, [  ] Eliquis, [  ] Pradaxa, [  ] IV Heparin drip, [  ] SCD [  ] Contraindication 2 to GI Bleed,[  ] Ambulation [  ] Contraindicated 2 to  bleed [  ] Contraindicated 2 to Brain Bleed  Advanced directive: [ x ] None, [  ] DNR/DNI

## 2024-04-28 NOTE — PROGRESS NOTE ADULT - PROBLEM SELECTOR PLAN 1
Meeting SIRS criteria with fever (T 100.8F), tachycardia  (), leukocytosis (WBC 13.58)  - On last admission (4/24), blood cultures with strep mitis/oralis in 1/4 bottles, likely contamination  - lactate wnl, procalcitonin elevated at 0.50   - COVID/flu/RSV negative  - RVP negative  - CXR negative  - UA positive  - F/u urine culture , NGTD  - F/u blood cultures x 2 , NGTD  - maintenance IVF with NS at 75 cc/hr  - s/p Rocephin 2g x1 in ED  - continue Cefepime 2g IVPB q8h  - ID consulted (Dr. NAHID Couch) d/w pt & Family at [facility]. Meeting SIRS criteria with fever (T 100.8F), tachycardia  (), leukocytosis (WBC 13.58)  - On last admission (4/24), blood cultures with strep mitis/oralis in 1/4 bottles, likely contamination  - lactate wnl, procalcitonin elevated at 0.50   - COVID/flu/RSV negative  - RVP negative  - CXR negative  - UA positive  - F/u urine culture , NGTD  - F/u blood cultures x 2 , NGTD  - maintenance IVF with NS at 75 cc/hr  - s/p Rocephin 2g x1 in ED  - continue Cefepime 2g IVPB q8h  - ID consulted (Dr. NAHID Couch) d/w pt & Family at [facility].  Plan for CT Chest, A/P with IV & Oral contrast, ECHO ,MRSA nasal PCR swab  Pt had Lumbar epidural inj on 4/2/24 with Pain management & EGD/Colonoscopy ~ 4 weeks ago,   Recently saw his Urologist as well

## 2024-04-29 VITALS — TEMPERATURE: 98 F

## 2024-04-29 DIAGNOSIS — R93.89 ABNORMAL FINDINGS ON DIAGNOSTIC IMAGING OF OTHER SPECIFIED BODY STRUCTURES: ICD-10-CM

## 2024-04-29 DIAGNOSIS — R93.7 ABNORMAL FINDINGS ON DIAGNOSTIC IMAGING OF OTHER PARTS OF MUSCULOSKELETAL SYSTEM: ICD-10-CM

## 2024-04-29 LAB
ALBUMIN SERPL ELPH-MCNC: 2.8 G/DL — LOW (ref 3.3–5)
ALP SERPL-CCNC: 76 U/L — SIGNIFICANT CHANGE UP (ref 40–120)
ALT FLD-CCNC: 37 U/L — SIGNIFICANT CHANGE UP (ref 12–78)
ANION GAP SERPL CALC-SCNC: 5 MMOL/L — SIGNIFICANT CHANGE UP (ref 5–17)
AST SERPL-CCNC: 16 U/L — SIGNIFICANT CHANGE UP (ref 15–37)
BASOPHILS # BLD AUTO: 0.04 K/UL — SIGNIFICANT CHANGE UP (ref 0–0.2)
BASOPHILS NFR BLD AUTO: 0.5 % — SIGNIFICANT CHANGE UP (ref 0–2)
BILIRUB SERPL-MCNC: 0.5 MG/DL — SIGNIFICANT CHANGE UP (ref 0.2–1.2)
BUN SERPL-MCNC: 10 MG/DL — SIGNIFICANT CHANGE UP (ref 7–23)
CALCIUM SERPL-MCNC: 9.2 MG/DL — SIGNIFICANT CHANGE UP (ref 8.5–10.1)
CHLORIDE SERPL-SCNC: 110 MMOL/L — HIGH (ref 96–108)
CO2 SERPL-SCNC: 29 MMOL/L — SIGNIFICANT CHANGE UP (ref 22–31)
CREAT SERPL-MCNC: 0.81 MG/DL — SIGNIFICANT CHANGE UP (ref 0.5–1.3)
CULTURE RESULTS: SIGNIFICANT CHANGE UP
EGFR: 97 ML/MIN/1.73M2 — SIGNIFICANT CHANGE UP
EOSINOPHIL # BLD AUTO: 0.23 K/UL — SIGNIFICANT CHANGE UP (ref 0–0.5)
EOSINOPHIL NFR BLD AUTO: 2.8 % — SIGNIFICANT CHANGE UP (ref 0–6)
ERYTHROCYTE [SEDIMENTATION RATE] IN BLOOD: 40 MM/HR — HIGH (ref 0–20)
GLUCOSE SERPL-MCNC: 108 MG/DL — HIGH (ref 70–99)
HCT VFR BLD CALC: 31 % — LOW (ref 39–50)
HGB BLD-MCNC: 10.5 G/DL — LOW (ref 13–17)
IMM GRANULOCYTES NFR BLD AUTO: 0.6 % — SIGNIFICANT CHANGE UP (ref 0–0.9)
LYMPHOCYTES # BLD AUTO: 1.33 K/UL — SIGNIFICANT CHANGE UP (ref 1–3.3)
LYMPHOCYTES # BLD AUTO: 16.3 % — SIGNIFICANT CHANGE UP (ref 13–44)
MCHC RBC-ENTMCNC: 28.3 PG — SIGNIFICANT CHANGE UP (ref 27–34)
MCHC RBC-ENTMCNC: 33.9 GM/DL — SIGNIFICANT CHANGE UP (ref 32–36)
MCV RBC AUTO: 83.6 FL — SIGNIFICANT CHANGE UP (ref 80–100)
MONOCYTES # BLD AUTO: 1.35 K/UL — HIGH (ref 0–0.9)
MONOCYTES NFR BLD AUTO: 16.5 % — HIGH (ref 2–14)
MRSA PCR RESULT.: SIGNIFICANT CHANGE UP
NEUTROPHILS # BLD AUTO: 5.16 K/UL — SIGNIFICANT CHANGE UP (ref 1.8–7.4)
NEUTROPHILS NFR BLD AUTO: 63.3 % — SIGNIFICANT CHANGE UP (ref 43–77)
NRBC # BLD: 0 /100 WBCS — SIGNIFICANT CHANGE UP (ref 0–0)
PLATELET # BLD AUTO: 190 K/UL — SIGNIFICANT CHANGE UP (ref 150–400)
POTASSIUM SERPL-MCNC: 3.9 MMOL/L — SIGNIFICANT CHANGE UP (ref 3.5–5.3)
POTASSIUM SERPL-SCNC: 3.9 MMOL/L — SIGNIFICANT CHANGE UP (ref 3.5–5.3)
PROT SERPL-MCNC: 6.2 G/DL — SIGNIFICANT CHANGE UP (ref 6–8.3)
RBC # BLD: 3.71 M/UL — LOW (ref 4.2–5.8)
RBC # FLD: 12.6 % — SIGNIFICANT CHANGE UP (ref 10.3–14.5)
S AUREUS DNA NOSE QL NAA+PROBE: SIGNIFICANT CHANGE UP
SODIUM SERPL-SCNC: 144 MMOL/L — SIGNIFICANT CHANGE UP (ref 135–145)
SPECIMEN SOURCE: SIGNIFICANT CHANGE UP
WBC # BLD: 8.16 K/UL — SIGNIFICANT CHANGE UP (ref 3.8–10.5)
WBC # FLD AUTO: 8.16 K/UL — SIGNIFICANT CHANGE UP (ref 3.8–10.5)

## 2024-04-29 PROCEDURE — 86140 C-REACTIVE PROTEIN: CPT

## 2024-04-29 PROCEDURE — 72158 MRI LUMBAR SPINE W/O & W/DYE: CPT | Mod: 26

## 2024-04-29 PROCEDURE — 85652 RBC SED RATE AUTOMATED: CPT

## 2024-04-29 PROCEDURE — 99285 EMERGENCY DEPT VISIT HI MDM: CPT

## 2024-04-29 PROCEDURE — 93306 TTE W/DOPPLER COMPLETE: CPT

## 2024-04-29 PROCEDURE — 87641 MR-STAPH DNA AMP PROBE: CPT

## 2024-04-29 PROCEDURE — 83036 HEMOGLOBIN GLYCOSYLATED A1C: CPT

## 2024-04-29 PROCEDURE — 72158 MRI LUMBAR SPINE W/O & W/DYE: CPT | Mod: MC

## 2024-04-29 PROCEDURE — 87040 BLOOD CULTURE FOR BACTERIA: CPT

## 2024-04-29 PROCEDURE — 87640 STAPH A DNA AMP PROBE: CPT

## 2024-04-29 PROCEDURE — 87637 SARSCOV2&INF A&B&RSV AMP PRB: CPT

## 2024-04-29 PROCEDURE — 96375 TX/PRO/DX INJ NEW DRUG ADDON: CPT

## 2024-04-29 PROCEDURE — 82962 GLUCOSE BLOOD TEST: CPT

## 2024-04-29 PROCEDURE — 72100 X-RAY EXAM L-S SPINE 2/3 VWS: CPT

## 2024-04-29 PROCEDURE — 85610 PROTHROMBIN TIME: CPT

## 2024-04-29 PROCEDURE — 87086 URINE CULTURE/COLONY COUNT: CPT

## 2024-04-29 PROCEDURE — 36415 COLL VENOUS BLD VENIPUNCTURE: CPT

## 2024-04-29 PROCEDURE — 81001 URINALYSIS AUTO W/SCOPE: CPT

## 2024-04-29 PROCEDURE — 93005 ELECTROCARDIOGRAM TRACING: CPT

## 2024-04-29 PROCEDURE — 71045 X-RAY EXAM CHEST 1 VIEW: CPT

## 2024-04-29 PROCEDURE — 83605 ASSAY OF LACTIC ACID: CPT

## 2024-04-29 PROCEDURE — 80053 COMPREHEN METABOLIC PANEL: CPT

## 2024-04-29 PROCEDURE — 84145 PROCALCITONIN (PCT): CPT

## 2024-04-29 PROCEDURE — 85730 THROMBOPLASTIN TIME PARTIAL: CPT

## 2024-04-29 PROCEDURE — 96374 THER/PROPH/DIAG INJ IV PUSH: CPT

## 2024-04-29 PROCEDURE — 85025 COMPLETE CBC W/AUTO DIFF WBC: CPT

## 2024-04-29 PROCEDURE — 74177 CT ABD & PELVIS W/CONTRAST: CPT | Mod: MC

## 2024-04-29 PROCEDURE — 0225U NFCT DS DNA&RNA 21 SARSCOV2: CPT

## 2024-04-29 PROCEDURE — 71260 CT THORAX DX C+: CPT | Mod: MC

## 2024-04-29 PROCEDURE — A9579: CPT

## 2024-04-29 PROCEDURE — 72100 X-RAY EXAM L-S SPINE 2/3 VWS: CPT | Mod: 26

## 2024-04-29 RX ADMIN — LEVETIRACETAM 750 MILLIGRAM(S): 250 TABLET, FILM COATED ORAL at 17:43

## 2024-04-29 RX ADMIN — PANTOPRAZOLE SODIUM 40 MILLIGRAM(S): 20 TABLET, DELAYED RELEASE ORAL at 05:55

## 2024-04-29 RX ADMIN — CEFEPIME 100 MILLIGRAM(S): 1 INJECTION, POWDER, FOR SOLUTION INTRAMUSCULAR; INTRAVENOUS at 15:16

## 2024-04-29 RX ADMIN — CEFEPIME 100 MILLIGRAM(S): 1 INJECTION, POWDER, FOR SOLUTION INTRAMUSCULAR; INTRAVENOUS at 05:55

## 2024-04-29 RX ADMIN — Medication 1 TABLET(S): at 15:16

## 2024-04-29 RX ADMIN — Medication 81 MILLIGRAM(S): at 15:16

## 2024-04-29 RX ADMIN — SODIUM CHLORIDE 75 MILLILITER(S): 9 INJECTION INTRAMUSCULAR; INTRAVENOUS; SUBCUTANEOUS at 15:16

## 2024-04-29 RX ADMIN — ENOXAPARIN SODIUM 40 MILLIGRAM(S): 100 INJECTION SUBCUTANEOUS at 15:15

## 2024-04-29 RX ADMIN — LEVETIRACETAM 750 MILLIGRAM(S): 250 TABLET, FILM COATED ORAL at 05:56

## 2024-04-29 NOTE — PROGRESS NOTE ADULT - PROBLEM SELECTOR PLAN 3
On previous admission, was diagnosed with new-onset seizure  - c/w Keppra 750mg BID IMPRESSION:  Mild bladder wall thickening with subtle surrounding fat stranding,   suggestive of cystitis. Otherwise no infectious process identified within   the chest, abdomen, or pelvis.    There is a 2.0 cm exophytic mass arising from the posterior aspect of the   gastric antrum, suspicious for a gastrointestinal stromal tumor (GIST).    There is masslike enhancement of the right lateral prostate gland, which   could be due to an underlying prostate mass. Correlation with PSA is   recommended, and if elevated, prostate protocol MRI.    -Above results d/w pt, wife & Dtr Mandi -  -Pt to follow up with Primary Urologist, GI as out pt -Pt had EGD -Colonoscopy ~ 4 weeks ago & saw Urologist Recently last Month

## 2024-04-29 NOTE — PROGRESS NOTE ADULT - PROBLEM SELECTOR PLAN 6
Chronic:  - c/w omeprazole 20mg daily (interchange with pantoprazole)   - c/w atorvastatin 40mg daily

## 2024-04-29 NOTE — PROGRESS NOTE ADULT - ATTENDING COMMENTS
68-year-old male with history of GERD, BPH presenting with fever to 102.8F at home and chills. Admitted for Ac Febrile illness, Leukocytosis  &  workup.   Pt seen, examined, Case & care plan d/w pt, residents at detail.  D/W pt, wife, Dtr & Dtr in Law at bed side -Pt  & Family wants pt  to be D/C ed home today as pt has wedding tomorrow 4/28/24 , Pt & family takes full responsibility for pt as NO Blood & urine cultures are  available & pt has Leukocytosis today, Pt & Family Voiced full verbal understanding of pending ALl results, pt & family advised to return Back to ER -Hospital for Fever, AMS-Confusion, Positive Blood Cultures or any other worsening symptoms , pt & Family has agreed to follow up with ID-for blood & urine culture & CBC for Leucocytosis for Monday. PO Abx Vantin 200 mg  2x day x 7 days   Consults:  ID -Dr NAHID Couch for Fever ,Leukocytosis -IV Abx -Blood culture 4/24-Likely Contamination., Follow Repeat Blood c/sx 2   PO diet  AM labs   DVT ppx  GI Ppx .
68-year-old male with history of GERD, BPH presenting with fever to 102.8F at home and chills. Admitted for Ac Febrile illness, Leukocytosis  &  workup.   Pt seen, examined, Case & care plan d/w pt, residents at detail.  D/W pt, wife, Dtr & Dtr in Law at bed side -Pt Had fever-Plan for CT Chest with IV Contrast CT /A/P with PO & IV Contrast , ECHO ,WBC Improving   Consults: follow up  ID -Dr NAHID Couch -IV Abx -Blood culture -NGTD Urine C/s-NEG , Care plan d/w everyone at detail.  PO diet  AM labs   DVT ppx  GI Ppx .  total Care time is 60 minutes.
68-year-old male with history of GERD, BPH presenting with fever to 102.8F at home and chills. Admitted for Ac Febrile illness, Leukocytosis  &  workup.   Pt seen, examined, Case & care plan d/w pt, residents at detail.  D/W pt, wife, Dtr & Dtr in Law at bed side - CT C/A/P results have been d/w pt, wife at detail, D/W Dtr Mandi ,  MRI L Spine results d/w Pt & wife at bed side at detail Plan for Orthopedic Spine Eval -Called ,ID D/W Radiology attending who read MRI L spine results.  Consults: follow up  ID -Dr NAHID Couch -IV Abx -Blood culture -NGTD Urine C/s-NEG  Cystitis likely , Care plan d/w pt & wife at detail.  PO diet  AM labs   DVT ppx  GI Ppx .  total Care time is 60 minutes.  D/W Orthopedic Team & MD -Plan for X Rays spine & MRI C Spine & MRI T Spine with & without IV Contrast   D/W Pt & Dtr -Mandi- they do NOT wish to stay in hospital for X rays or MRI of spines, Pt & dtr agrees to follow up with His primary Out pt Spine Orthopedic for Repeat MRI of L Spine with & without IV Contrast after 3 days for follow up of Collections.  D/W ID-DR NAHID Couch-Stable for D/C from ID Stand point with PO Abx - Total 7 dyas for UTI/Cystitis  Pt , Dtr & wife has agreed & Voiced verbal understanding for out pt follow up & work up with Ortho spine in 3 days for Repeat MRI L spine with IV Contrast as out pt & will complete PO Abx & will have Close Follow up as out pt.  D/C Home today   Total d/c care time is 70 minutes.

## 2024-04-29 NOTE — PROGRESS NOTE ADULT - SUBJECTIVE AND OBJECTIVE BOX
[facility], Division of Infectious Diseases  BRITTANIE Sterling Y. Patel, S. Shah, G. [facility]  [phone]    Name: GABE BLOOD  Age: 68y  Gender: Male  MRN: [id number]    Interval History:  Patient seen and examined at bedside this morning  No acute overnight events. Afebrile  Feeling much better  Notes reviewed    Antibiotics:  cefepime   IVPB 2000 milliGRAM(s) IV Intermittent every 8 hours      Medications:  acetaminophen     Tablet .. 650 milliGRAM(s) Oral every 6 hours PRN  aspirin  chewable 81 milliGRAM(s) Oral daily  atorvastatin 40 milliGRAM(s) Oral at bedtime  cefepime   IVPB 2000 milliGRAM(s) IV Intermittent every 8 hours  enoxaparin Injectable 40 milliGRAM(s) SubCutaneous every 24 hours  levETIRAcetam 750 milliGRAM(s) Oral two times a day  multivitamin 1 Tablet(s) Oral daily  pantoprazole    Tablet 40 milliGRAM(s) Oral before breakfast  sodium chloride 0.9%. 1000 milliLiter(s) IV Continuous <Continuous>  tamsulosin 0.8 milliGRAM(s) Oral at bedtime      Review of Systems:  A 10-point review of systems was obtained.  Review of systems otherwise negative except as previously noted.    Allergies: penicillin (Rash)  penicillin (Hives)    For details regarding the patient's past medical history, social history, family history, and other miscellaneous elements, please refer the initial infectious diseases consultation and/or the admitting history and physical examination for this admission.    Objective:  Vitals:   T(C): 36.7 (04-29-24 @ 13:34), Max: 37.2 (04-28-24 @ 20:02)  HR: 72 (04-29-24 @ 13:34) (72 - 75)  BP: 133/76 (04-29-24 @ 13:34) (133/76 - 147/79)  RR: 17 (04-29-24 @ 13:34) (17 - 19)  SpO2: 94% (04-29-24 @ 13:34) (93% - 95%)    Physical Examination:  General: no acute distress  HEENT: NC/AT, EOMI,   Cardio: S1, S2 heard, RRR, no murmurs  Resp: CTAB  Abd: soft, NT, ND,  Ext: no edema or cyanosis  Skin: warm, dry, no visible rash      Laboratory Studies:  CBC:                       10.5   8.16  )-----------( 190      ( 29 Apr 2024 06:00 )             31.0     CMP: 04-29    144  |  110<H>  |  10  ----------------------------<  108<H>  3.9   |  29  |  0.81    Ca    9.2      29 Apr 2024 06:00    TPro  6.2  /  Alb  2.8<L>  /  TBili  0.5  /  DBili  x   /  AST  16  /  ALT  37  /  AlkPhos  76  04-29    LIVER FUNCTIONS - ( 29 Apr 2024 06:00 )  Alb: 2.8 g/dL / Pro: 6.2 g/dL / ALK PHOS: 76 U/L / ALT: 37 U/L / AST: 16 U/L / GGT: x           Urinalysis Basic - ( 29 Apr 2024 06:00 )    Color: x / Appearance: x / SG: x / pH: x  Gluc: 108 mg/dL / Ketone: x  / Bili: x / Urobili: x   Blood: x / Protein: x / Nitrite: x   Leuk Esterase: x / RBC: x / WBC x   Sq Epi: x / Non Sq Epi: x / Bacteria: x        Microbiology: reviewed    Culture - Urine (collected 04-27-24 @ 01:00)  Source: Clean Catch Clean Catch (Midstream)  Final Report (04-28-24 @ 12:04):    <10,000 CFU/mL Normal Urogenital Dalia    Culture - Blood (collected 04-26-24 @ 22:30)  Source: .Blood Blood-Peripheral  Preliminary Report (04-29-24 @ 06:02):    No growth at 48 Hours    Culture - Blood (collected 04-26-24 @ 22:15)  Source: .Blood Blood-Peripheral  Preliminary Report (04-29-24 @ 06:02):    No growth at 48 Hours    Culture - Blood (collected 04-24-24 @ 09:38)  Source: .Blood Blood-Peripheral  Preliminary Report (04-28-24 @ 16:00):    No growth at 4 days    Culture - Blood (collected 04-24-24 @ 09:30)  Source: .Blood Blood-Peripheral  Gram Stain (04-25-24 @ 22:40):    Growth in aerobic bottle: Gram positive cocci in pairs  Final Report (04-26-24 @ 18:52):    Growth in aerobic bottle: Streptococcus mitis/oralis group    Alpha hemolytic streptoccous (Not Streptococcus pneumoniae or    Enterococcus)    isolated from a single blood culture sets may represent    contamination. Contact the Microbiology Department [phone] if    susceptibility testing is    Direct identification is available within approximately 3-5    hours either by Blood Panel Multiplexed PCR or Direct    MALDI-TOF. Details: [email or web address]  Organism: Blood Culture PCR (04-26-24 @ 18:52)  Organism: Blood Culture PCR (04-26-24 @ 18:52)      Method Type: PCR      -  Streptococcus sp. (Not Grp A, B or S pneumoniae): Detec          Radiology: reviewed    < from: CT Chest w/ Oral Cont and w/ IV Cont (04.28.24 @ 17:49) >    ACC: [id number] EXAM:  CT ABDOMEN AND PELVIS OC IC   ORDERED BY: SANDY JONES     ACC: [id number] EXAM:  CT CHEST OC IC   ORDERED BY: SANDY JONES     PROCEDURE DATE:  04/28/2024          INTERPRETATION:  CLINICAL INFORMATION: Fever.    COMPARISON: Lumbar spine MRI dated 9/27/2022.    CONTRAST/COMPLICATIONS:  IV Contrast: Omnipaque 350 (accession [id number]), IV contrast documented   in unlinked concurrent exam (accession [id number])  90 cc administered   10   cc discarded  Oral Contrast: Omnipaque 300  Complications: None reported at time of study completion    PROCEDURE:  CT of the Chest, Abdomen and Pelvis was performed.  Sagittal and coronal reformats were performed.    FINDINGS:  CHEST:  LUNGS AND LARGE AIRWAYS: Patent central airways. No pulmonary nodules.  PLEURA: No pleural effusion.  VESSELS: Within normal limits.  HEART: Heart size is normal. No pericardial effusion.  MEDIASTINUM AND SHILO: No lymphadenopathy.  CHEST WALL AND LOWER NECK: Within normal limits.    ABDOMEN AND PELVIS:  LIVER: There are a few low-attenuation lesions within the liver with   dominant lesions including a 1.6 cm adjacent to the gallbladder fossa   (301-91), a 1.4 cm lesion within segment 7 (301-84) and 1.5 cm   low-attenuation lesion within segment 8 (301-75).  BILE DUCTS: Normal caliber.  GALLBLADDER: Within normal limits.  SPLEEN: Within normal limits.  PANCREAS: Within normal limits.  ADRENALS: Within normal limits.  KIDNEYS/URETERS: There are several tiny nodularity bilateral renal   calculi measuring up to 2 mm within each kidney. There is a 2.6 cm   exophytic right renal cyst near the right proximal ureter atelectasis   ).    BLADDER: The bladder wall is mildly thickened with subtle surrounding fat   stranding.  REPRODUCTIVE ORGANS: There is an apparent 2.7 x 3.9 cm area of masslike   enhancement within the right lateral prostate gland (301-170).    BOWEL: There is a 2.0 cm exophytic mass arising from the posterior aspect   of the gastric antrum (301-102). The bowel is otherwise unremarkable.   Appendix is not visualized. No evidence of inflammation in the pericecal   region.  PERITONEUM: No ascites.  VESSELS: Within normal limits.  RETROPERITONEUM/LYMPH NODES: No lymphadenopathy.  ABDOMINAL WALL: Small fat-containing right inguinal hernia.  BONES: There are multiple degenerative changes of the lumbar spine with   L1-L4 laminectomy defects. There is a posttraumatic or postsurgical   deformity of the left iliac bone noted. No acute fracture identified. The   sacrococcygeal junction has an approximately 90 degrees angulation. No   acute abnormality identified.    IMPRESSION:  Mild bladder wall thickening with subtle surrounding fat stranding,   suggestive of cystitis. Otherwise no infectious process identified within   the chest, abdomen, or pelvis.    There is a 2.0 cm exophytic mass arising from the posterior aspect of the   gastric antrum, suspicious for a gastrointestinal stromal tumor (GIST).    There is masslike enhancement of the right lateral prostate gland, which   could be due to an underlying prostate mass. Correlation with PSA is   recommended, and if elevated, prostate protocol MRI.        --- End of Report ---            PENNY MERCHANT MD; Attending Radiologist  This document has been electronically signed. Apr 28 2024  6:37PM    < end of copied text >  < from: MR Lumbar Spine w/wo IV Cont (04.29.24 @ 09:39) >    ACC: [id number] EXAM:  MR SPINE LUMBAR WAW IC   ORDERED BY: FRANCES ENAMORADO     PROCEDURE DATE:  04/29/2024          INTERPRETATION:  Clinical indication: History lumbar surgery. Fever.    MRI of the lumbar spine was performed sagittal T1 and T2 and STIR   sequences. Axial T1 and T2-weighted sequences were performed. Coronal   T1-weighted sequence performed. The patient was injected with   approximately 1.5 cc gadavist IV and sagittal T1-weighted sequence was   performed with fat suppression. Axial T1-weighted sequence performed.    This exam is compared with prior MRI of the lumbar spine performed on   September 27, 2022.    Loss of the normal lumbar lordosis is again seen.    Scoliosis is again seen.    Hemangiomas are seen involving the L3,L4 and L5 vertebral bodies which   appear unchanged.    The vertebral body height appears maintained.    Schmorl's nodes are seen involving multiple levels secondary to chronic   degenerative changes    Disc desiccation is seen involving the L5-S1 level with associated Modic   type II degenerative changes. These findings were present on the prior   study as well.    Postoperative changes compatible with bilateral laminectomy is seen   extending from L1 to L3 is again seen.    There are new small areas of abnormal T1 and T2 prolongation seen just   posterior to the right facet joint at the L2 level (in the postop   region). This is best seen on series 6 images 19 and 20. This finding   measures approximately 1.1 x 0.7 cm. Evaluation of the surrounding soft   tissues appears unremarkable. Tiny subcentimeter focus of T2 prolongation   is also seen posterior to the right L3 facet joint. This finding measures   approximately 4.6 mm and is best seen on series 4 image 17 and series 6   image 33.The possibility of tiny multiloculated abscesses cannot be   entirely excluded.    T12-L1: Bilateral hypertrophic facet joint joint changes are seen. No   significant compromise of the spinal canal or either neural foramen.   These findings appear unchanged.    L1-2: Disc bulge and bilateral hypertrophic facet changes are seen.   Moderate narrowing of the spinal canal. Mild narrowing of the left neural   foramen and moderate narrowing of the right neural foramen are again seen.    L2-3: Disc bulge and bilateral hypertrophic facet joint changes are again   seen. Mild to moderate narrowing of the spinal canal and mild narrowing   of both neural foramen is again seen.    L3-4: Disc bulge and bilateral hypertrophic facet joint changes are seen.   Moderate narrowing of the spinal canal. Mild narrowing of both neural   foramen. These findings appear unchanged.    L4-5: Disc bulge and bilateral hypertrophic facet changes are again seen.   Mild narrowing of the spinal canal. Mild narrowing of theleft neural   foramen. These finds appear unchanged.    L5-S1: Disc bulge and bilateral hypertrophic facet changes again seen.   Mild to moderate narrowing of the right neural foramen and moderate   narrowing of the left neural foramen    The conus ends at L2 and appears normal.    Both SI joints appear intact.    IMPRESSION: Postop changes are identified.    Small collections are suspected in the posterior paraspinal soft tissue   region at the postop L2 and L3 levels. Clinical correlation and continued   close interval follow-up is recommended.    --- End of Report ---            HAJA LAZO MD; Attending Radiologist  This document has been electronically signed. Apr 29 2024 12:21PM    < end of copied text >

## 2024-04-29 NOTE — CARE COORDINATION ASSESSMENT. - REASON FOR CONSULT
SW met with patient and his wife Ele at bedside in order to conduct assessment and to discuss SW roles with good understanding./coordination of care/discharge planning

## 2024-04-29 NOTE — PROGRESS NOTE ADULT - PROBLEM SELECTOR PLAN 1
Meeting SIRS criteria with fever (T 100.8F), tachycardia  (), leukocytosis (WBC 13.58)  - On last admission (4/24), blood cultures with strep mitis/oralis in 1/4 bottles, likely contamination  - lactate wnl, procalcitonin elevated at 0.50   - COVID/flu/RSV negative  - RVP negative  - CXR negative  - UA positive  - F/u urine culture , NGTD  - F/u blood cultures x 2 , NGTD  - maintenance IVF with NS at 75 cc/hr  - s/p Rocephin 2g x1 in ED  - continue Cefepime 2g IVPB q8h  - CT abdomen and CT chest resulted, concern for cystitis  - MR lumbar ordered  -TTE ordered, f/u results  - ID consulted (Dr. NAHID Couch) d/w pt & Family at VERY Detail. Meeting SIRS criteria with fever (T 100.8F), tachycardia  (), leukocytosis (WBC 13.58)  - On last admission (4/24), blood cultures with strep mitis/oralis in 1/4 bottles, likely contamination  - lactate wnl, procalcitonin elevated at 0.50   - COVID/flu/RSV negative  -Nasal MRSA PCR-NEG   - RVP negative  - CXR negative  - UA positive  - F/u urine culture , NGTD  - F/u blood cultures x 2 , NGTD  - maintenance IVF with NS at 75 cc/hr  - s/p Rocephin 2g x1 in ED  - continue Cefepime 2g IVPB q8h  - CT abdomen and CT chest resulted, concern for cystitis  - MR lumbar as above   -TTE as above  - ID consulted (Dr. NAHID Couch) d/w pt & Family at VERY Detail .plan  cefpodoxime 200mg BID to complete x7 day  for UTI/Cystitis

## 2024-04-29 NOTE — CARE COORDINATION ASSESSMENT. - NSCAREPROVIDERS_GEN_ALL_CORE_FT
CARE PROVIDERS:  Accepting Physician: Franco Couch  Access Services: John Mike  Administration: Delaney Osorio  Administration: Hao Dunn  Administration: Manohar Staley  Administration: Valeria Oleary  Admitting: Franco Couch  Attending: Franco Couch  Case Management: Cristy Webber  Consultant: Nayely Couch  Covering Team: Vargas Corado  Covering Team: Henok Mccartney  ED Attending: Otis Medina  ED Nurse: Immanuel Wilks  Nurse: Rachel Jalloh  Ordered: ServiceAccount, SCMMLM  Ordered: Heather Landaverde  Ordered: ADM, User  PCA/Nursing Assistant: Jenifer Marc  PCA/Nursing Assistant: Briseyda Uribe  Primary Team: Fernando Gillis  Primary Team: Ethan Ocasio  Primary Team: Erin Be  Primary Team: Jackelyn Mclean  Registered Dietitian: Janny Hillman  : Lauren Delgado  Student: Porsha Gordon

## 2024-04-29 NOTE — PROGRESS NOTE ADULT - SUBJECTIVE AND OBJECTIVE BOX
Patient is a 68y old  Male who presents with a chief complaint of sepsis workup (28 Apr 2024 15:13)    HPI:  68-year-old male with history of recent new onset seizure 4/24/24 , past  small CVA with no residual symptoms, HLD, GERD, BPH presenting with fever to 102.8F. Pt was recently admitted to [facility] from 4/24-4/25 for suspected new onset seizure, Was seen by Dr. Puckett and was started on atorvastatin, aspirin, and Keppra. Since discharge, pt has been compliant with all medications. However today, pt was noted by family to appear weak associated with chills and some mild confusion. Also found be to febrile at home to 102.8F, at which point family brought pt back to the ED. Denies recent travel, recent illness, or sick contacts. Denies cough, shortness of breath, chest pain, abdominal pain, nausea, vomiting, diarrhea, constipation, pain or burning with urination.     ED course  VS: T 100.8F orally, , /68, RR 18, SpO2 94% RA  Labs: WBC 13.58, H/H 11.7/34.3, Ca 8.4, TBili 1.5, procalcitonin 0.50  Imaging:  - CXR clear on wet read  EKG: NSR HR 86, QTc 447  In ED given: Rocephin 2g x1, 2L NS bolus x1, OFIRMEV 1g x1   (27 Apr 2024 01:16)    INTERVAL HPI:      OVERNIGHT EVENTS:    Home Medications:  alfuzosin 10 mg oral tablet, extended release: 1 tab(s) orally once a day (27 Apr 2024 01:16)  Multiple Vitamins oral tablet: 1 tab(s) orally once a day (27 Apr 2024 01:16)  omeprazole 20 mg oral delayed release capsule: 1 cap(s) orally once a day (27 Apr 2024 01:16)      MEDICATIONS  (STANDING):  aspirin  chewable 81 milliGRAM(s) Oral daily  atorvastatin 40 milliGRAM(s) Oral at bedtime  cefepime   IVPB 2000 milliGRAM(s) IV Intermittent every 8 hours  enoxaparin Injectable 40 milliGRAM(s) SubCutaneous every 24 hours  levETIRAcetam 750 milliGRAM(s) Oral two times a day  multivitamin 1 Tablet(s) Oral daily  pantoprazole    Tablet 40 milliGRAM(s) Oral before breakfast  sodium chloride 0.9%. 1000 milliLiter(s) (75 mL/Hr) IV Continuous <Continuous>  tamsulosin 0.8 milliGRAM(s) Oral at bedtime    MEDICATIONS  (PRN):  acetaminophen     Tablet .. 650 milliGRAM(s) Oral every 6 hours PRN Temp greater or equal to 38C (100.4F), Mild Pain (1 - 3)      Allergies    penicillin (Rash)  penicillin (Hives)    Intolerances        Social History:  Lives at home, employed  Ambulates without assistance  Denies tobacco use  Denies alcohol use  Denies illicit drug use (27 Apr 2024 01:16)      REVIEW OF SYSTEMS:  CONSTITUTIONAL: No fever, No chills, No fatigue, No myalgia, No Body ache, No Weakness  EYES: No eye pain,  No visual disturbances, No discharge, NO Redness  ENMT:  No ear pain, No nose bleed, No vertigo; No sinus pain, NO throat pain, No Congestion  NECK: No pain, No stiffness  RESPIRATORY: No cough, NO wheezing, No  hemoptysis, NO  shortness of breath  CARDIOVASCULAR: No chest pain, palpitations  GASTROINTESTINAL: No abdominal pain, NO epigastric pain. No nausea, No vomiting; No diarrhea, No constipation. [  ] BM  GENITOURINARY: No dysuria, No frequency, No urgency, No hematuria, NO incontinence  NEUROLOGICAL: No headaches, No dizziness, No numbness, No tingling, No tremors, No weakness  EXT: No Swelling, No Pain, No Edema  SKIN:  [  ] No itching, burning, rashes, or lesions   MUSCULOSKELETAL: No joint pain ,No Jt swelling; No muscle pain, No back pain, No extremity pain  PSYCHIATRIC: No depression,  No anxiety,  No mood swings ,No difficulty sleeping at night  PAIN SCALE: [  ] None  [  ] Other-  ROS Unable to obtain due to - [  ] Dementia  [  ] Lethargy [  ] Drowsy [  ] Sedated [  ] non verbal  REST OF REVIEW Of SYSTEM - [  ] Normal     Vital Signs Last 24 Hrs  T(C): 37.2 (29 Apr 2024 05:22), Max: 37.2 (28 Apr 2024 20:02)  T(F): 98.9 (29 Apr 2024 05:22), Max: 98.9 (28 Apr 2024 20:02)  HR: 75 (29 Apr 2024 05:22) (74 - 78)  BP: 147/79 (29 Apr 2024 05:22) (144/84 - 154/79)  BP(mean): --  RR: 19 (29 Apr 2024 05:22) (18 - 19)  SpO2: 93% (29 Apr 2024 05:22) (93% - 95%)    Parameters below as of 29 Apr 2024 05:22  Patient On (Oxygen Delivery Method): room air      Finger Stick        04-28 @ 07:01  -  04-29 @ 07:00  --------------------------------------------------------  IN: 750 mL / OUT: 0 mL / NET: 750 mL        PHYSICAL EXAM:  GENERAL:  [  ] NAD , [  ] well appearing, [  ] Agitated, [  ] Drowsy,  [  ] Lethargy, [  ] confused   HEAD:  [  ] Normal, [  ] Other  EYES:  [  ] EOMI, [  ] PERRLA, [  ] conjunctiva and sclera clear normal, [  ] Other,  [  ] Pallor,[  ] Discharge  ENMT:  [  ] Normal, [  ] Moist mucous membranes, [  ] Good dentition, [  ] No Thrush  NECK:  [  ] Supple, [  ] No JVD, [  ] Normal thyroid, [  ] Lymphadenopathy [  ] Other  CHEST/LUNG:  [  ] Clear to auscultation bilaterally, [  ] Breath Sounds equal B/L / Decrease, [  ] poor effort  [  ] No rales, [  ] No rhonchi  [  ]  No wheezing,   HEART:  [  ] Regular rate and rhythm, [  ] tachycardia, [  ] Bradycardia,  [  ] irregular  [  ] No murmurs, No rubs, No gallops, [  ] PPM in place (Mfr:  )  ABDOMEN:  [  ] Soft, [  ] Nontender, [  ] Nondistended, [  ] No mass, [  ] Bowel sounds present, [  ] obese  NERVOUS SYSTEM:  [  ] Alert & Oriented X3, [  ] Nonfocal  [  ] Confusion  [  ] Encephalopathic [  ] Sedated [  ] Unable to assess, [  ] Dementia [  ] Other-  EXTREMITIES: [  ] 2+ Peripheral Pulses, No clubbing, No cyanosis,  [  ] edema B/L lower EXT. [  ] PVD stasis skin changes B/L Lower EXT, [  ] wound  LYMPH: No lymphadenopathy noted  SKIN:  [  ] No rashes or lesions, [  ] Pressure Ulcers, [  ] ecchymosis, [  ] Skin Tears, [  ] Other    DIET: Diet, Regular (04-27-24 @ 01:09)      LABS:                        10.5   8.16  )-----------( 190      ( 29 Apr 2024 06:00 )             31.0     29 Apr 2024 06:00    144    |  110    |  10     ----------------------------<  108    3.9     |  29     |  0.81     Ca    9.2        29 Apr 2024 06:00    TPro  6.2    /  Alb  2.8    /  TBili  0.5    /  DBili  x      /  AST  16     /  ALT  37     /  AlkPhos  76     29 Apr 2024 06:00      Urinalysis Basic - ( 29 Apr 2024 06:00 )    Color: x / Appearance: x / SG: x / pH: x  Gluc: 108 mg/dL / Ketone: x  / Bili: x / Urobili: x   Blood: x / Protein: x / Nitrite: x   Leuk Esterase: x / RBC: x / WBC x   Sq Epi: x / Non Sq Epi: x / Bacteria: x        Culture Results:   <10,000 CFU/mL Normal Urogenital Dalia (04-27 @ 01:00)  Culture Results:   No growth at 48 Hours (04-26 @ 22:30)  Culture Results:   No growth at 48 Hours (04-26 @ 22:15)  Culture Results:   No growth at 4 days (04-24 @ 09:38)  Culture Results:   Growth in aerobic bottle: Streptococcus mitis/oralis group  Alpha hemolytic streptoccous (Not Streptococcus pneumoniae or  Enterococcus)  isolated from a single blood culture sets may represent  contamination. Contact the Microbiology Department [phone] if  susceptibility testing is  Direct identification is available within approximately 3-5  hours either by Blood Panel Multiplexed PCR or Direct  MALDI-TOF. Details: [email or web address] (04-24 @ 09:30)          CARDIAC MARKERS ( 24 Apr 2024 16:13 )  x     / x     / 226 U/L / x     / x              Culture - Urine (collected 27 Apr 2024 01:00)  Source: Clean Catch Clean Catch (Midstream)  Final Report (28 Apr 2024 12:04):    <10,000 CFU/mL Normal Urogenital Dalia    Culture - Blood (collected 26 Apr 2024 22:30)  Source: .Blood Blood-Peripheral  Preliminary Report (29 Apr 2024 06:02):    No growth at 48 Hours    Culture - Blood (collected 26 Apr 2024 22:15)  Source: .Blood Blood-Peripheral  Preliminary Report (29 Apr 2024 06:02):    No growth at 48 Hours    Culture - Blood (collected 24 Apr 2024 09:38)  Source: .Blood Blood-Peripheral  Preliminary Report (28 Apr 2024 16:00):    No growth at 4 days    Culture - Blood (collected 24 Apr 2024 09:30)  Source: .Blood Blood-Peripheral  Gram Stain (25 Apr 2024 22:40):    Growth in aerobic bottle: Gram positive cocci in pairs  Final Report (26 Apr 2024 18:52):    Growth in aerobic bottle: Streptococcus mitis/oralis group    Alpha hemolytic streptoccous (Not Streptococcus pneumoniae or    Enterococcus)    isolated from a single blood culture sets may represent    contamination. Contact the Microbiology Department [phone] if    susceptibility testing is    Direct identification is available within approximately 3-5    hours either by Blood Panel Multiplexed PCR or Direct    MALDI-TOF. Details: [email or web address]  Organism: Blood Culture PCR (26 Apr 2024 18:52)  Organism: Blood Culture PCR (26 Apr 2024 18:52)         Anemia Panel:      Thyroid Panel:                RADIOLOGY & ADDITIONAL TESTS:      HEALTH ISSUES - PROBLEM Dx:  Systemic inflammatory response syndrome (SIRS)    H/O: CVA (cerebrovascular accident)    History of seizure disorder    BPH (benign prostatic hyperplasia)    GERD (gastroesophageal reflux disease)    Need for prophylactic measure    HLD (hyperlipidemia)            Consultant(s) Notes Reviewed:  [  ] YES     Care Discussed with [X] Consultants  [  ] Patient  [  ] Family [  ] HCP [  ]   [  ] Social Service  [  ] RN, [  ] Physical Therapy,[  ] Palliative care team  DVT PPX: [  ] Lovenox, [  ] S C Heparin, [  ] Coumadin, [  ] Xarelto, [  ] Eliquis, [  ] Pradaxa, [  ] IV Heparin drip, [  ] SCD [  ] Contraindication 2 to GI Bleed,[  ] Ambulation [  ] Contraindicated 2 to  bleed [  ] Contraindicated 2 to Brain Bleed  Advanced directive: [  ] None, [  ] DNR/DNI Patient is a 68y old  Male who presents with a chief complaint of sepsis workup (28 Apr 2024 15:13)    HPI:  68-year-old male with history of recent new onset seizure 4/24/24 , past  small CVA with no residual symptoms, HLD, GERD, BPH presenting with fever to 102.8F. Pt [facility] for suspected new onset seizure, Was seen by Dr. Puckett and was started on atorvastatin, aspirin, and Keppra. Since discharge, pt has been compliant with all medications. However today, pt was noted by family to appear weak associated with chills and some mild confusion. Also found be to febrile at home to 102.8F, at which point family brought pt back to the ED. Denies recent travel, recent illness, or sick contacts. Denies cough, shortness of breath, chest pain, abdominal pain, nausea, vomiting, diarrhea, constipation, pain or burning with urination.     ED course  VS: T 100.8F orally, , /68, RR 18, SpO2 94% RA  Labs: WBC 13.58, H/H 11.7/34.3, Ca 8.4, TBili 1.5, procalcitonin 0.50  Imaging:  - CXR clear on wet read  EKG: NSR HR 86, QTc 447  In ED given: Rocephin 2g x1, 2L NS bolus x1, OFIRMEV 1g x1   (27 Apr 2024 01:16)    INTERVAL HPI:  04/28: patient recently admitted.  seen at bedside.  Patient is aware of fever overnight.  Major complaint today is increased urinary frequency, prompting multiple trips to the bathroom at night.  Patient denies pain  04/29: VSS overnight.  Patient seen at bedside.  States he is able to control his urination problem a little more today.  has no complaints today, appears tired.      OVERNIGHT EVENTS:  HEAVENLY      Home Medications:  alfuzosin 10 mg oral tablet, extended release: 1 tab(s) orally once a day (27 Apr 2024 01:16)  Multiple Vitamins oral tablet: 1 tab(s) orally once a day (27 Apr 2024 01:16)  omeprazole 20 mg oral delayed release capsule: 1 cap(s) orally once a day (27 Apr 2024 01:16)      MEDICATIONS  (STANDING):  aspirin  chewable 81 milliGRAM(s) Oral daily  atorvastatin 40 milliGRAM(s) Oral at bedtime  cefepime   IVPB 2000 milliGRAM(s) IV Intermittent every 8 hours  enoxaparin Injectable 40 milliGRAM(s) SubCutaneous every 24 hours  levETIRAcetam 750 milliGRAM(s) Oral two times a day  multivitamin 1 Tablet(s) Oral daily  pantoprazole    Tablet 40 milliGRAM(s) Oral before breakfast  sodium chloride 0.9%. 1000 milliLiter(s) (75 mL/Hr) IV Continuous <Continuous>  tamsulosin 0.8 milliGRAM(s) Oral at bedtime    MEDICATIONS  (PRN):  acetaminophen     Tablet .. 650 milliGRAM(s) Oral every 6 hours PRN Temp greater or equal to 38C (100.4F), Mild Pain (1 - 3)      Allergies    penicillin (Rash)  penicillin (Hives)    Intolerances        Social History:  Lives at home, employed  Ambulates without assistance  Denies tobacco use  Denies alcohol use  Denies illicit drug use (27 Apr 2024 01:16)      REVIEW OF SYSTEMS:  CONSTITUTIONAL: No fever, No chills, No fatigue, No myalgia, No Body ache, No Weakness  EYES: No eye pain,  No visual disturbances, No discharge, NO Redness  ENMT:  No ear pain, No nose bleed, No vertigo; No sinus pain, NO throat pain, No Congestion  NECK: No pain, No stiffness  RESPIRATORY: No cough, NO wheezing, No  hemoptysis, NO  shortness of breath  CARDIOVASCULAR: No chest pain, palpitations  GASTROINTESTINAL: No abdominal pain, NO epigastric pain. No nausea, No vomiting; No diarrhea, No constipation. [  ] BM  GENITOURINARY: No dysuria, No frequency, No urgency, No hematuria, NO incontinence  NEUROLOGICAL: No headaches, No dizziness, No numbness, No tingling, No tremors, No weakness  EXT: No Swelling, No Pain, No Edema  SKIN:  [  ] No itching, burning, rashes, or lesions   MUSCULOSKELETAL: No joint pain ,No Jt swelling; No muscle pain, No back pain, No extremity pain  PSYCHIATRIC: No depression,  No anxiety,  No mood swings ,No difficulty sleeping at night  PAIN SCALE: [  ] None  [  ] Other-  ROS Unable to obtain due to - [  ] Dementia  [  ] Lethargy [  ] Drowsy [  ] Sedated [  ] non verbal  REST OF REVIEW Of SYSTEM - [ x ] Normal     Vital Signs Last 24 Hrs  T(C): 37.2 (29 Apr 2024 05:22), Max: 37.2 (28 Apr 2024 20:02)  T(F): 98.9 (29 Apr 2024 05:22), Max: 98.9 (28 Apr 2024 20:02)  HR: 75 (29 Apr 2024 05:22) (74 - 78)  BP: 147/79 (29 Apr 2024 05:22) (144/84 - 154/79)  BP(mean): --  RR: 19 (29 Apr 2024 05:22) (18 - 19)  SpO2: 93% (29 Apr 2024 05:22) (93% - 95%)    Parameters below as of 29 Apr 2024 05:22  Patient On (Oxygen Delivery Method): room air      Finger Stick        04-28 @ 07:01  -  04-29 @ 07:00  --------------------------------------------------------  IN: 750 mL / OUT: 0 mL / NET: 750 mL        PHYSICAL EXAM:  GENERAL:  [ x ] NAD , [  ] well appearing, [  ] Agitated, [  ] Drowsy,  [  ] Lethargy, [  ] confused   HEAD:  [x  ] Normal, [  ] Other  EYES:  [  x] EOMI, [  ] PERRLA, [  ] conjunctiva and sclera clear normal, [  ] Other,  [  ] Pallor,[  ] Discharge  ENMT:  [x  ] Normal, [  ] Moist mucous membranes, [  ] Good dentition, [  ] No Thrush  NECK:  [  ] Supple, [  ] No JVD, [  ] Normal thyroid, [  ] Lymphadenopathy [  ] Other  CHEST/LUNG:  [  x] Clear to auscultation bilaterally, [  ] Breath Sounds equal B/L / Decrease, [  ] poor effort  [  ] No rales, [  ] No rhonchi  [  ]  No wheezing,   HEART:  [ x ] Regular rate and rhythm, [  ] tachycardia, [  ] Bradycardia,  [  ] irregular  [  ] No murmurs, No rubs, No gallops, [  ] PPM in place (Mfr:  )  ABDOMEN:  [x  ] Soft, [  x] Nontender, [x  ] Nondistended, [  ] No mass, [  ] Bowel sounds present, [  ] obese  NERVOUS SYSTEM:  [x  ] Alert & Oriented X3, [  ] Nonfocal  [  ] Confusion  [  ] Encephalopathic [  ] Sedated [  ] Unable to assess, [  ] Dementia [  ] Other-  EXTREMITIES: [  ] 2+ Peripheral Pulses, No clubbing, No cyanosis,  [  ] edema B/L lower EXT. [  ] PVD stasis skin changes B/L Lower EXT, [  ] wound  LYMPH: No lymphadenopathy noted  SKIN:  [  x] No rashes or lesions, [  ] Pressure Ulcers, [  ] ecchymosis, [  ] Skin Tears, [  ] Other    DIET: Diet, Regular (04-27-24 @ 01:09)      LABS:                        10.5   8.16  )-----------( 190      ( 29 Apr 2024 06:00 )             31.0     29 Apr 2024 06:00    144    |  110    |  10     ----------------------------<  108    3.9     |  29     |  0.81     Ca    9.2        29 Apr 2024 06:00    TPro  6.2    /  Alb  2.8    /  TBili  0.5    /  DBili  x      /  AST  16     /  ALT  37     /  AlkPhos  76     29 Apr 2024 06:00      Urinalysis Basic - ( 29 Apr 2024 06:00 )    Color: x / Appearance: x / SG: x / pH: x  Gluc: 108 mg/dL / Ketone: x  / Bili: x / Urobili: x   Blood: x / Protein: x / Nitrite: x   Leuk Esterase: x / RBC: x / WBC x   Sq Epi: x / Non Sq Epi: x / Bacteria: x        Culture Results:   <10,000 CFU/mL Normal Urogenital Dalia (04-27 @ 01:00)  Culture Results:   No growth at 48 Hours (04-26 @ 22:30)  Culture Results:   No growth at 48 Hours (04-26 @ 22:15)  Culture Results:   No growth at 4 days (04-24 @ 09:38)  Culture Results:   Growth in aerobic bottle: Streptococcus mitis/oralis group  Alpha hemolytic streptoccous (Not Streptococcus pneumoniae or  Enterococcus)  isolated from a single blood culture sets may represent  contamination. Contact the Microbiology Department [phone] if  susceptibility testing is  Direct identification is available within approximately 3-5  hours either by Blood Panel Multiplexed PCR or Direct  MALDI-TOF. Details: [email or web address] (04-24 @ 09:30)          CARDIAC MARKERS ( 24 Apr 2024 16:13 )  x     / x     / 226 U/L / x     / x              Culture - Urine (collected 27 Apr 2024 01:00)  Source: Clean Catch Clean Catch (Midstream)  Final Report (28 Apr 2024 12:04):    <10,000 CFU/mL Normal Urogenital Dalia    Culture - Blood (collected 26 Apr 2024 22:30)  Source: .Blood Blood-Peripheral  Preliminary Report (29 Apr 2024 06:02):    No growth at 48 Hours    Culture - Blood (collected 26 Apr 2024 22:15)  Source: .Blood Blood-Peripheral  Preliminary Report (29 Apr 2024 06:02):    No growth at 48 Hours    Culture - Blood (collected 24 Apr 2024 09:38)  Source: .Blood Blood-Peripheral  Preliminary Report (28 Apr 2024 16:00):    No growth at 4 days    Culture - Blood (collected 24 Apr 2024 09:30)  Source: .Blood Blood-Peripheral  Gram Stain (25 Apr 2024 22:40):    Growth in aerobic bottle: Gram positive cocci in pairs  Final Report (26 Apr 2024 18:52):    Growth in aerobic bottle: Streptococcus mitis/oralis group    Alpha hemolytic streptoccous (Not Streptococcus pneumoniae or    Enterococcus)    isolated from a single blood culture sets may represent    contamination. Contact the Microbiology Department [phone] if    susceptibility testing is    Direct identification is available within approximately 3-5    hours either by Blood Panel Multiplexed PCR or Direct    MALDI-TOF. Details: [email or web address]  Organism: Blood Culture PCR (26 Apr 2024 18:52)  Organism: Blood Culture PCR (26 Apr 2024 18:52)         Anemia Panel:      Thyroid Panel:                RADIOLOGY & ADDITIONAL TESTS:      HEALTH ISSUES - PROBLEM Dx:  Systemic inflammatory response syndrome (SIRS)    H/O: CVA (cerebrovascular accident)    History of seizure disorder    BPH (benign prostatic hyperplasia)    GERD (gastroesophageal reflux disease)    Need for prophylactic measure    HLD (hyperlipidemia)            Consultant(s) Notes Reviewed:  [x  ] YES     Care Discussed with [X] Consultants  [ x ] Patient  [  ] Family [  ] HCP [  ]   [  ] Social Service  [  ] RN, [  ] Physical Therapy,[  ] Palliative care team  DVT PPX: [  ] Lovenox, [  ] S C Heparin, [  ] Coumadin, [  ] Xarelto, [  ] Eliquis, [  ] Pradaxa, [  ] IV Heparin drip, [  ] SCD [  ] Contraindication 2 to GI Bleed,[  ] Ambulation [  ] Contraindicated 2 to  bleed [  ] Contraindicated 2 to Brain Bleed  Advanced directive: [  ] None, [  ] DNR/DNI Patient is a 68y old  Male who presents with a chief complaint of sepsis workup (28 Apr 2024 15:13)    HPI:  68-year-old male with history of recent new onset seizure 4/24/24 , past  small CVA with no residual symptoms, HLD, GERD, BPH presenting with fever to 102.8F. Pt was recently admitted to [facility]"" from 4/24-4/25 for suspected new onset seizure, Was seen by Dr. Puckett and was started on atorvastatin, aspirin, and Keppra. Since discharge, pt has been compliant with all medications. However today, pt was noted by family to appear weak associated with chills and some mild confusion. Also found be to febrile at home to 102.8F, at which point family brought pt back to the ED. Denies recent travel, recent illness, or sick contacts. Denies cough, shortness of breath, chest pain, abdominal pain, nausea, vomiting, diarrhea, constipation, pain or burning with urination.     ED course  VS: T 100.8F orally, , /68, RR 18, SpO2 94% RA  Labs: WBC 13.58, H/H 11.7/34.3, Ca 8.4, TBili 1.5, procalcitonin 0.50  Imaging:  - CXR clear on wet read  EKG: NSR HR 86, QTc 447  In ED given: Rocephin 2g x1, 2L NS bolus x1, OFIRMEV 1g x1   (27 Apr 2024 01:16)    INTERVAL HPI:  04/28: patient recently admitted.  seen at bedside.  Patient is aware of fever overnight.  Major complaint today is increased urinary frequency, prompting multiple trips to the bathroom at night.  Patient denies pain  04/29: VSS overnight.  Patient seen at bedside.  States he is able to control his urination problem a little more today.  has no complaints today, appears tired.  WBC-Nl. Afebrile, On IV Abx     OVERNIGHT EVENTS:  NONE      Home Medications:  alfuzosin 10 mg oral tablet, extended release: 1 tab(s) orally once a day (27 Apr 2024 01:16)  Multiple Vitamins oral tablet: 1 tab(s) orally once a day (27 Apr 2024 01:16)  omeprazole 20 mg oral delayed release capsule: 1 cap(s) orally once a day (27 Apr 2024 01:16)      MEDICATIONS  (STANDING):  aspirin  chewable 81 milliGRAM(s) Oral daily  atorvastatin 40 milliGRAM(s) Oral at bedtime  cefepime   IVPB 2000 milliGRAM(s) IV Intermittent every 8 hours  enoxaparin Injectable 40 milliGRAM(s) SubCutaneous every 24 hours  levETIRAcetam 750 milliGRAM(s) Oral two times a day  multivitamin 1 Tablet(s) Oral daily  pantoprazole    Tablet 40 milliGRAM(s) Oral before breakfast  sodium chloride 0.9%. 1000 milliLiter(s) (75 mL/Hr) IV Continuous <Continuous>  tamsulosin 0.8 milliGRAM(s) Oral at bedtime    MEDICATIONS  (PRN):  acetaminophen     Tablet .. 650 milliGRAM(s) Oral every 6 hours PRN Temp greater or equal to 38C (100.4F), Mild Pain (1 - 3)      Allergies    penicillin (Rash)  penicillin (Hives)    Intolerances        Social History:  Lives at home, employed  Ambulates without assistance  Denies tobacco use  Denies alcohol use  Denies illicit drug use (27 Apr 2024 01:16)      REVIEW OF SYSTEMS: i am fine  back to my self  CONSTITUTIONAL: No fever, No chills, No fatigue, No myalgia, No Body ache, No Weakness  EYES: No eye pain,  No visual disturbances, No discharge, NO Redness  ENMT:  No ear pain, No nose bleed, No vertigo; No sinus pain, NO throat pain, No Congestion  NECK: No pain, No stiffness  RESPIRATORY: No cough, NO wheezing, No  hemoptysis, NO  shortness of breath  CARDIOVASCULAR: No chest pain, palpitations  GASTROINTESTINAL: No abdominal pain, NO epigastric pain. No nausea, No vomiting; No diarrhea, No constipation. [x  ] BM  GENITOURINARY: No dysuria, No frequency, No urgency, No hematuria, NO incontinence  NEUROLOGICAL: No headaches, No dizziness, No numbness, No tingling, No tremors, No weakness  EXT: No Swelling, No Pain, No Edema  SKIN:  [ x ] No itching, burning, rashes, or lesions   MUSCULOSKELETAL: No joint pain ,No Jt swelling; No muscle pain, No back pain, No extremity pain  PSYCHIATRIC: No depression,  No anxiety,  No mood swings ,No difficulty sleeping at night  PAIN SCALE: [x  ] None  [  ] Other-  ROS Unable to obtain due to - [  ] Dementia  [  ] Lethargy [  ] Drowsy [  ] Sedated [  ] non verbal  REST OF REVIEW Of SYSTEM - [ x ] Normal     Vital Signs Last 24 Hrs  T(C): 37.2 (29 Apr 2024 05:22), Max: 37.2 (28 Apr 2024 20:02)  T(F): 98.9 (29 Apr 2024 05:22), Max: 98.9 (28 Apr 2024 20:02)  HR: 75 (29 Apr 2024 05:22) (74 - 78)  BP: 147/79 (29 Apr 2024 05:22) (144/84 - 154/79)  BP(mean): --  RR: 19 (29 Apr 2024 05:22) (18 - 19)  SpO2: 93% (29 Apr 2024 05:22) (93% - 95%)    Parameters below as of 29 Apr 2024 05:22  Patient On (Oxygen Delivery Method): room air      Finger Stick        04-28 @ 07:01  -  04-29 @ 07:00  --------------------------------------------------------  IN: 750 mL / OUT: 0 mL / NET: 750 mL        PHYSICAL EXAM:  GENERAL:  [ x ] NAD , [ x ] well appearing, [  ] Agitated, [  ] Drowsy,  [  ] Lethargy, [  ] confused   HEAD:  [x  ] Normal, [  ] Other  EYES:  [  x] EOMI, [  x] PERRLA, [ x ] conjunctiva and sclera clear normal, [  ] Other,  [  ] Pallor,[  ] Discharge  ENMT:  [x  ] Normal, [x  ] Moist mucous membranes, [ x ] Good dentition, [ x ] No Thrush  NECK:  [  ] Supple, [ x ] No JVD, [x  ] Normal thyroid, [  ] Lymphadenopathy [  ] Other  CHEST/LUNG:  [  x] Clear to auscultation bilaterally, [ x ] Breath Sounds equal B/L / Decrease, [ x ] poor effort  [x ] No rales, [ x ] No rhonchi  [x  ]  No wheezing,   HEART:  [ x ] Regular rate and rhythm, [  ] tachycardia, [  ] Bradycardia,  [  ] irregular  [x  ] No murmurs, No rubs, No gallops, [  ] PPM in place (Mfr:  )  ABDOMEN:  [x  ] Soft, [  x] Nontender, [x  ] Nondistended, [ x ] No mass, [ x ] Bowel sounds present, [  ] obese  NERVOUS SYSTEM:  [x  ] Alert & Oriented X3, [x ] Nonfocal  [  ] Confusion  [  ] Encephalopathic [  ] Sedated [  ] Unable to assess, [  ] Dementia [  ] Other-  EXTREMITIES: [  ] 2+ Peripheral Pulses, No clubbing, No cyanosis,  [  ] edema B/L lower EXT. [  ] PVD stasis skin changes B/L Lower EXT, [  ] wound, BACK-Lower -Scar- NO Swelling, No Collection , NO Pain   LYMPH: No lymphadenopathy noted  SKIN:  [  x] No rashes or lesions, [  ] Pressure Ulcers, [  ] ecchymosis, [  ] Skin Tears, [  ] Other    DIET: Diet, Regular (04-27-24 @ 01:09)      LABS:                        10.5   8.16  )-----------( 190      ( 29 Apr 2024 06:00 )             31.0     29 Apr 2024 06:00    144    |  110    |  10     ----------------------------<  108    3.9     |  29     |  0.81     Ca    9.2        29 Apr 2024 06:00    TPro  6.2    /  Alb  2.8    /  TBili  0.5    /  DBili  x      /  AST  16     /  ALT  37     /  AlkPhos  76     29 Apr 2024 06:00      Urinalysis Basic - ( 29 Apr 2024 06:00 )    Color: x / Appearance: x / SG: x / pH: x  Gluc: 108 mg/dL / Ketone: x  / Bili: x / Urobili: x   Blood: x / Protein: x / Nitrite: x   Leuk Esterase: x / RBC: x / WBC x   Sq Epi: x / Non Sq Epi: x / Bacteria: x        Culture Results:   <10,000 CFU/mL Normal Urogenital Dalia (04-27 @ 01:00)  Culture Results:   No growth at 48 Hours (04-26 @ 22:30)  Culture Results:   No growth at 48 Hours (04-26 @ 22:15)  Culture Results:   No growth at 4 days (04-24 @ 09:38)  Culture Results:   Growth in aerobic bottle: Streptococcus mitis/oralis group  Alpha hemolytic streptoccous (Not Streptococcus pneumoniae or  Enterococcus)  isolated from a single blood culture sets may represent  contamination. Contact the Microbiology Department [phone] if  susceptibility testing is  Direct identification is available within approximately 3-5  hours either by Blood Panel Multiplexed PCR or Direct  MALDI-TOF. Details: [email or web address] (04-24 @ 09:30)          CARDIAC MARKERS ( 24 Apr 2024 16:13 )  x     / x     / 226 U/L / x     / x              Culture - Urine (collected 27 Apr 2024 01:00)  Source: Clean Catch Clean Catch (Midstream)  Final Report (28 Apr 2024 12:04):    <10,000 CFU/mL Normal Urogenital Dalia    Culture - Blood (collected 26 Apr 2024 22:30)  Source: .Blood Blood-Peripheral  Preliminary Report (29 Apr 2024 06:02):    No growth at 48 Hours    Culture - Blood (collected 26 Apr 2024 22:15)  Source: .Blood Blood-Peripheral  Preliminary Report (29 Apr 2024 06:02):    No growth at 48 Hours    Culture - Blood (collected 24 Apr 2024 09:38)  Source: .Blood Blood-Peripheral  Preliminary Report (28 Apr 2024 16:00):    No growth at 4 days    Culture - Blood (collected 24 Apr 2024 09:30)  Source: .Blood Blood-Peripheral  Gram Stain (25 Apr 2024 22:40):    Growth in aerobic bottle: Gram positive cocci in pairs  Final Report (26 Apr 2024 18:52):    Growth in aerobic bottle: Streptococcus mitis/oralis group    Alpha hemolytic streptoccous (Not Streptococcus pneumoniae or    Enterococcus)    isolated from a single blood culture sets may represent    contamination. Contact the Microbiology Department [phone] if    susceptibility testing is    Direct identification is available within approximately 3-5    hours either by Blood Panel Multiplexed PCR or Direct    MALDI-TOF. Details: [email or web address]  Organism: Blood Culture PCR (26 Apr 2024 18:52)  Organism: Blood Culture PCR (26 Apr 2024 18:52)      RADIOLOGY & ADDITIONAL TESTS:  < from: Xray Lumbar Spine AP + Lateral (04.29.24 @ 15:44) >    IMPRESSION:   Degenerative spondylosis primarily affecting the L4-5 and 5   S1 disc spaces as described.  Age-indeterminate loss of height of L5 vertebra.  Please see same day lumbar spine MR report.    < end of copied text >  < from: CT Chest w/ Oral Cont and w/ IV Cont (04.28.24 @ 17:49) >    COMPARISON: Lumbar spine MRI dated 9/27/2022.    CONTRAST/COMPLICATIONS:  IV Contrast: Omnipaque 350 (accession [id number]), IV contrast documented   in unlinked concurrent exam (accession [id number])  90 cc administered   10   cc discarded  Oral Contrast: Omnipaque 300  Complications: None reported at time of study completion    PROCEDURE:  CT of the Chest, Abdomen and Pelvis was performed.  Sagittal and coronal reformats were performed.    FINDINGS:  CHEST:  LUNGS AND LARGE AIRWAYS: Patent central airways. No pulmonary nodules.  PLEURA: No pleural effusion.  VESSELS: Within normal limits.  HEART: Heart size is normal. No pericardial effusion.  MEDIASTINUM AND SHILO: No lymphadenopathy.  CHEST WALL AND LOWER NECK: Within normal limits.    ABDOMEN AND PELVIS:  LIVER: There are a few low-attenuation lesions within the liver with   dominant lesions including a 1.6 cm adjacent to the gallbladder fossa   (301-91), a 1.4 cm lesion within segment 7 (301-84) and 1.5 cm   low-attenuation lesion within segment 8 (301-75).  BILE DUCTS: Normal caliber.  GALLBLADDER: Within normal limits.  SPLEEN: Within normal limits.  PANCREAS: Within normal limits.  ADRENALS: Within normal limits.  KIDNEYS/URETERS: There are several tiny nodularity bilateral renal   calculi measuring up to 2 mm within each kidney. There is a 2.6 cm   exophytic right renal cyst near the right proximal ureter atelectasis   ).    BLADDER: The bladder wall is mildly thickened with subtle surrounding fat   stranding.  REPRODUCTIVE ORGANS: There is an apparent 2.7 x 3.9 cm area of masslike   enhancement within the right lateral prostate gland (301-170).    BOWEL: There is a 2.0 cm exophytic mass arising from the posterior aspect   of the gastric antrum (301-102). The bowel is otherwise unremarkable.   Appendix is not visualized. No evidence of inflammation in the pericecal   region.  PERITONEUM: No ascites.  VESSELS: Within normal limits.  RETROPERITONEUM/LYMPH NODES: No lymphadenopathy.  ABDOMINAL WALL: Small fat-containing right inguinal hernia.  BONES: There are multiple degenerative changes of the lumbar spine with   L1-L4 laminectomy defects. There is a posttraumatic or postsurgical   deformity of the left iliac bone noted. No acute fracture identified. The   sacrococcygeal junction has an approximately 90 degrees angulation. No   acute abnormality identified.    IMPRESSION:  Mild bladder wall thickening with subtle surrounding fat stranding,   suggestive of cystitis. Otherwise no infectious process identified within   the chest, abdomen, or pelvis.    There is a 2.0 cm exophytic mass arising from the posterior aspect of the   gastric antrum, suspicious for a gastrointestinal stromal tumor (GIST).    There is masslike enhancement of the right lateral prostate gland, which   could be due to an underlying prostate mass. Correlation with PSA is   recommended, and if elevated, prostate protocol MRI.          < end of copied text >  < from: MR Lumbar Spine w/wo IV Cont (04.29.24 @ 09:39) >    There are new small areas of abnormal T1 and T2 prolongation seen just   posterior to the right facet joint at the L2 level (in the postop   region). This is best seen on series 6 images 19 and 20. This finding   measures approximately 1.1 x 0.7 cm. Evaluation of the surrounding soft   tissues appears unremarkable. Tiny subcentimeter focus of T2 prolongation   is also seen posterior to the right L3 facet joint. This finding measures   approximately 4.6 mm and is best seen on series 4 image 17 and series 6   image 33.The possibility of tiny multiloculated abscesses cannot be   entirely excluded.    T12-L1: Bilateral hypertrophic facet joint joint changes are seen. No   significant compromise of the spinal canal or either neural foramen.   These findings appear unchanged.    L1-2: Disc bulge and bilateral hypertrophic facet changes are seen.   Moderate narrowing of the spinal canal. Mild narrowing of the left neural   foramen and moderate narrowing of the right neural foramen are again seen.    L2-3: Disc bulge and bilateral hypertrophic facet joint changes are again   seen. Mild to moderate narrowing of the spinal canal and mild narrowing   of both neural foramen is again seen.    L3-4: Disc bulge and bilateral hypertrophic facet joint changes are seen.   Moderate narrowing of the spinal canal. Mild narrowing of both neural   foramen. These findings appear unchanged.    L4-5: Disc bulge and bilateral hypertrophic facet changes are again seen.   Mild narrowing of the spinal canal. Mild narrowing of theleft neural   foramen. These finds appear unchanged.    L5-S1: Disc bulge and bilateral hypertrophic facet changes again seen.   Mild to moderate narrowing of the right neural foramen and moderate   narrowing of the left neural foramen    The conus ends at L2 and appears normal.    Both SI joints appear intact.    IMPRESSION: Postop changes are identified.    Small collections are suspected in the posterior paraspinal soft tissue   region at the postop L2 and L3 levels. Clinical correlation and continued   close interval follow-up is recommended.    --- End of Report ---      < end of copied text >      HEALTH ISSUES - PROBLEM Dx:  Systemic inflammatory response syndrome (SIRS)    H/O: CVA (cerebrovascular accident)    History of seizure disorder    BPH (benign prostatic hyperplasia)    GERD (gastroesophageal reflux disease)    Need for prophylactic measure    HLD (hyperlipidemia)            Consultant(s) Notes Reviewed:  [x  ] YES     Care Discussed with [X] Consultants  [ x ] Patient  [x  ] Family- wife, Dtr  [  ] HCP [  ]   [  ] Social Service  [ x ] RN, [  ] Physical Therapy,[  ] Palliative care team  DVT PPX: [ x ] Lovenox, [  ] S C Heparin, [  ] Coumadin, [  ] Xarelto, [  ] Eliquis, [  ] Pradaxa, [  ] IV Heparin drip, [  ] SCD [  ] Contraindication 2 to GI Bleed,[  ] Ambulation [  ] Contraindicated 2 to  bleed [  ] Contraindicated 2 to Brain Bleed  Advanced directive: [x  ] None, [  ] DNR/DNI

## 2024-04-29 NOTE — CONSULT NOTE ADULT - SUBJECTIVE AND OBJECTIVE BOX
Pt is a 68M PMH HTN, HLD, BPH, Hx CVA, A81, L1-L4 Lami (Dr. Murray, '22) who was admitted to the hospital for seizure/sepsis workup. Ortho consulted for suspected small collections in posterior paraspinal soft tissue region L2-L3 seen on MRI LSp. States since his lami procedure in 2022, pt has had persistent lower back pain treated with injections, most recently on 4/2/2024. Denies radicular pain, recent injuries, falls, trauma, or any acute onset of new LBP worsening pain. At baseline, ambulates independently with weak L ankle dorsiflexion. Denies numbness, tingling, chills, CP, SOB, N/V/D, urinary/bowel incontinence.    Vital Signs (24 Hrs):  T(C): 36.7 (04-29-24 @ 13:34), Max: 37.2 (04-28-24 @ 20:02)  HR: 72 (04-29-24 @ 13:34) (72 - 75)  BP: 133/76 (04-29-24 @ 13:34) (133/76 - 147/79)  RR: 17 (04-29-24 @ 13:34) (17 - 19)  SpO2: 94% (04-29-24 @ 13:34) (93% - 95%)  Wt(kg): --    LABS:                          10.5   8.16  )-----------( 190      ( 29 Apr 2024 06:00 )             31.0     04-29    144  |  110<H>  |  10  ----------------------------<  108<H>  3.9   |  29  |  0.81    Ca    9.2      29 Apr 2024 06:00    TPro  6.2  /  Alb  2.8<L>  /  TBili  0.5  /  DBili  x   /  AST  16  /  ALT  37  /  AlkPhos  76  04-29    LIVER FUNCTIONS - ( 29 Apr 2024 06:00 )  Alb: 2.8 g/dL / Pro: 6.2 g/dL / ALK PHOS: 76 U/L / ALT: 37 U/L / AST: 16 U/L / GGT: x           Physical Exam:   General: NAD, patient laying in bed comfortably  Calves nontender  Compartments compressible, soft    Spine:  NTTP of C-,T-,L-Spine, no step offs    MOTOR EXAM:                          Elbow Flex (C5)     Wrist Ext (C6)     Elbow Ext (C7)      Finger Flex (C8)    Finger Abduction (T1)  RIGHT                 5/5                         5/5                         5/5                          5/5                              5/5  LEFT                    5/5                         5/5                         5/5                          5/5                              5/5                         Hip Flex (L2)      Knee Ext (L3)      Ank Dorsiflex (L4)     Hallux Ext (L5)     Ank PlantarFlex (S1)  RIGHT               5/5                      5/5                          5/5                            5/5                           5/5  LEFT                  5/5                      5/5                          5/5                            5/5                           5/5    SENSORY EXAM:                        C5      C6      C7      C8       T1          RIGHT          2         2        2         2         2          (0=absent, 1=impaired, 2=normal, NT=not testable)  LEFT             2         2        2         2         2          (0=absent, 1=impaired, 2=normal, NT=not testable)                      L2        L3       L4      L5       S1          RIGHT        2          2         2        2        2           (0=absent, 1=impaired, 2=normal, NT=not testable)  LEFT           2          2         2        2        2           (0=absent, 1=impaired, 2=normal, NT=not testable)    No pain with SLR, no clonus, no babinksi, no norris    MRI LSp: Small collections are suspected in the posterior paraspinal soft tissue region at the postop L2 and L3 levels.     A/P: 68M who presents with paraspinal small collections seen on MRI at L2-L3    WBAT  PT/OT  Follow up AM labs  Analgesics  DVT PPx per primary team  Incentive spirometry  Stable for discharge from orthopaedic standpoint   Can follow up with Dr. Carter in office for further management  Appreciate medical recs  Will discuss plan with Dr. Carter and notify primary team of any changes to plan Pt is a 68M PMH HTN, HLD, BPH, Hx CVA, A81, L1-L4 Lami (Dr. Murray, '22) who was admitted to the hospital for seizure/sepsis workup. Ortho consulted for suspected small collections in posterior paraspinal soft tissue region L2-L3 seen on MRI LSp. States since his lami procedure in 2022, pt has had persistent lower back pain treated with injections, most recently on 4/2/2024. Denies radicular pain, recent injuries, falls, trauma, or any acute onset of new LBP worsening pain. At baseline, ambulates independently with weak L ankle dorsiflexion. Denies numbness, tingling, chills, CP, SOB, N/V/D, urinary/bowel incontinence.    Vital Signs (24 Hrs):  T(C): 36.7 (04-29-24 @ 13:34), Max: 37.2 (04-28-24 @ 20:02)  HR: 72 (04-29-24 @ 13:34) (72 - 75)  BP: 133/76 (04-29-24 @ 13:34) (133/76 - 147/79)  RR: 17 (04-29-24 @ 13:34) (17 - 19)  SpO2: 94% (04-29-24 @ 13:34) (93% - 95%)  Wt(kg): --    LABS:                          10.5   8.16  )-----------( 190      ( 29 Apr 2024 06:00 )             31.0     04-29    144  |  110<H>  |  10  ----------------------------<  108<H>  3.9   |  29  |  0.81    Ca    9.2      29 Apr 2024 06:00    TPro  6.2  /  Alb  2.8<L>  /  TBili  0.5  /  DBili  x   /  AST  16  /  ALT  37  /  AlkPhos  76  04-29    LIVER FUNCTIONS - ( 29 Apr 2024 06:00 )  Alb: 2.8 g/dL / Pro: 6.2 g/dL / ALK PHOS: 76 U/L / ALT: 37 U/L / AST: 16 U/L / GGT: x           Physical Exam:   General: NAD, patient laying in bed comfortably  Calves nontender  Compartments compressible, soft    Spine:  NTTP of C-,T-,L-Spine, no step offs    MOTOR EXAM:                          Elbow Flex (C5)     Wrist Ext (C6)     Elbow Ext (C7)      Finger Flex (C8)    Finger Abduction (T1)  RIGHT                 5/5                         5/5                         5/5                          5/5                              5/5  LEFT                    5/5                         5/5                         5/5                          5/5                              5/5                         Hip Flex (L2)      Knee Ext (L3)      Ank Dorsiflex (L4)     Hallux Ext (L5)     Ank PlantarFlex (S1)  RIGHT               5/5                      5/5                          5/5                            5/5                           5/5  LEFT                  5/5                      5/5                          4/5*                            5/5                           5/5  *baseline    SENSORY EXAM:                        C5      C6      C7      C8       T1          RIGHT          2         2        2         2         2          (0=absent, 1=impaired, 2=normal, NT=not testable)  LEFT             2         2        2         2         2          (0=absent, 1=impaired, 2=normal, NT=not testable)                      L2        L3       L4      L5       S1          RIGHT        2          2         2        2        2           (0=absent, 1=impaired, 2=normal, NT=not testable)  LEFT           2          2         2        2        2           (0=absent, 1=impaired, 2=normal, NT=not testable)    No pain with SLR, no clonus, no babinksi, no norris    MRI LSp: Small collections are suspected in the posterior paraspinal soft tissue region at the postop L2 and L3 levels.     A/P: 68M who presents with paraspinal small collections seen on MRI at L2-L3    WBAT  PT/OT  Follow up AM labs  Analgesics  DVT PPx per primary team  Incentive spirometry  Stable for discharge from orthopaedic standpoint   Can follow up with Dr. Carter in office for further management  Appreciate medical recs  Will discuss plan with Dr. Carter and notify primary team of any changes to plan Pt is a 68M PMH HTN, HLD, BPH, Hx CVA (on home A81), L1-L4 Lami (Dr. Murray, '22) who was admitted to the hospital for seizure/sepsis workup. Ortho consulted for suspected small collections in posterior paraspinal soft tissue region L2-L3 seen on MRI LSp. States since his lami procedure in 2022, pt has had persistent lower back pain treated with injections, most recently on 4/2/2024. Denies radicular pain, recent injuries, falls, trauma, or any acute onset of new LBP worsening pain. At baseline, ambulates independently with weak L ankle dorsiflexion. Denies numbness, tingling, chills, CP, SOB, N/V/D, urinary/bowel incontinence.    Vital Signs (24 Hrs):  T(C): 36.7 (04-29-24 @ 13:34), Max: 37.2 (04-28-24 @ 20:02)  HR: 72 (04-29-24 @ 13:34) (72 - 75)  BP: 133/76 (04-29-24 @ 13:34) (133/76 - 147/79)  RR: 17 (04-29-24 @ 13:34) (17 - 19)  SpO2: 94% (04-29-24 @ 13:34) (93% - 95%)  Wt(kg): --    LABS:                          10.5   8.16  )-----------( 190      ( 29 Apr 2024 06:00 )             31.0     04-29    144  |  110<H>  |  10  ----------------------------<  108<H>  3.9   |  29  |  0.81    Ca    9.2      29 Apr 2024 06:00    TPro  6.2  /  Alb  2.8<L>  /  TBili  0.5  /  DBili  x   /  AST  16  /  ALT  37  /  AlkPhos  76  04-29    LIVER FUNCTIONS - ( 29 Apr 2024 06:00 )  Alb: 2.8 g/dL / Pro: 6.2 g/dL / ALK PHOS: 76 U/L / ALT: 37 U/L / AST: 16 U/L / GGT: x           Physical Exam:   General: NAD, patient laying in bed comfortably  Calves nontender  Compartments compressible, soft    Spine:  NTTP of C-,T-,L-Spine, no step offs    MOTOR EXAM:                          Elbow Flex (C5)     Wrist Ext (C6)     Elbow Ext (C7)      Finger Flex (C8)    Finger Abduction (T1)  RIGHT                 5/5                         5/5                         5/5                          5/5                              5/5  LEFT                    5/5                         5/5                         5/5                          5/5                              5/5                         Hip Flex (L2)      Knee Ext (L3)      Ank Dorsiflex (L4)     Hallux Ext (L5)     Ank PlantarFlex (S1)  RIGHT               5/5                      5/5                          5/5                            5/5                           5/5  LEFT                  5/5                      5/5                          4/5*                            5/5                           5/5  *baseline    SENSORY EXAM:                        C5      C6      C7      C8       T1          RIGHT          2         2        2         2         2          (0=absent, 1=impaired, 2=normal, NT=not testable)  LEFT             2         2        2         2         2          (0=absent, 1=impaired, 2=normal, NT=not testable)                      L2        L3       L4      L5       S1          RIGHT        2          2         2        2        2           (0=absent, 1=impaired, 2=normal, NT=not testable)  LEFT           2          2         2        2        2           (0=absent, 1=impaired, 2=normal, NT=not testable)    No pain with SLR, no clonus, no babinksi, no norris    MRI LSp: Small collections are suspected in the posterior paraspinal soft tissue region at the postop L2 and L3 levels.     A/P: 68M who presents with paraspinal small collections seen on MRI at L2-L3    Recommend MRI cervical/thoracic spine with/without contrast  Followup ESR/CRP, blood cultures  Recommend weekly ESR/CRP   Continue ABx per ID recs  WBAT  PT/OT  Analgesics  DVT PPx per primary team  Incentive spirometry  Appreciate medical recs  Will discuss plan with Dr. Carter and notify primary team of any changes to plan Pt is a 68M PMH HTN, HLD, BPH, Hx CVA (on home A81), L1-L4 Lami (Dr. Murray, '22) who was admitted to the hospital for seizure/sepsis workup. Ortho consulted for suspected small collections in posterior paraspinal soft tissue region L2-L3 seen on MRI LSp. States since his lami procedure in 2022, pt has had persistent lower back pain treated with injections, most recently on 4/2/2024. Denies radicular pain, recent injuries, falls, trauma, or any acute onset of new LBP worsening pain. At baseline, ambulates independently with weak L ankle dorsiflexion. Denies numbness, tingling, chills, CP, SOB, N/V/D, urinary/bowel incontinence.    Vital Signs (24 Hrs):  T(C): 36.7 (04-29-24 @ 13:34), Max: 37.2 (04-28-24 @ 20:02)  HR: 72 (04-29-24 @ 13:34) (72 - 75)  BP: 133/76 (04-29-24 @ 13:34) (133/76 - 147/79)  RR: 17 (04-29-24 @ 13:34) (17 - 19)  SpO2: 94% (04-29-24 @ 13:34) (93% - 95%)  Wt(kg): --    LABS:                          10.5   8.16  )-----------( 190      ( 29 Apr 2024 06:00 )             31.0     04-29    144  |  110<H>  |  10  ----------------------------<  108<H>  3.9   |  29  |  0.81    Ca    9.2      29 Apr 2024 06:00    TPro  6.2  /  Alb  2.8<L>  /  TBili  0.5  /  DBili  x   /  AST  16  /  ALT  37  /  AlkPhos  76  04-29    LIVER FUNCTIONS - ( 29 Apr 2024 06:00 )  Alb: 2.8 g/dL / Pro: 6.2 g/dL / ALK PHOS: 76 U/L / ALT: 37 U/L / AST: 16 U/L / GGT: x           Physical Exam:   General: NAD, patient laying in bed comfortably  Calves nontender  Compartments compressible, soft    Spine:  NTTP of C-,T-,L-Spine, no step offs    MOTOR EXAM:                          Elbow Flex (C5)     Wrist Ext (C6)     Elbow Ext (C7)      Finger Flex (C8)    Finger Abduction (T1)  RIGHT                 5/5                         5/5                         5/5                          5/5                              5/5  LEFT                    5/5                         5/5                         5/5                          5/5                              5/5                         Hip Flex (L2)      Knee Ext (L3)      Ank Dorsiflex (L4)     Hallux Ext (L5)     Ank PlantarFlex (S1)  RIGHT               5/5                      5/5                          5/5                            5/5                           5/5  LEFT                  5/5                      5/5                          4/5*                            5/5                           5/5  *baseline    SENSORY EXAM:                        C5      C6      C7      C8       T1          RIGHT          2         2        2         2         2          (0=absent, 1=impaired, 2=normal, NT=not testable)  LEFT             2         2        2         2         2          (0=absent, 1=impaired, 2=normal, NT=not testable)                      L2        L3       L4      L5       S1          RIGHT        2          2         2        2        2           (0=absent, 1=impaired, 2=normal, NT=not testable)  LEFT           2          2         2        2        2           (0=absent, 1=impaired, 2=normal, NT=not testable)    No pain with SLR, no clonus, no babinksi, no norris    MRI LSp: Small collections are suspected in the posterior paraspinal soft tissue region at the postop L2 and L3 levels.     A/P: 68M who presents with paraspinal small collections seen on MRI at L2-L3    Recommend MRI cervical/thoracic spine with/without contrast  Followup ESR/CRP, blood cultures  Recommend weekly ESR/CRP   Continue ABx per ID recs  WBAT  PT/OT  Analgesics  DVT PPx per primary team  Incentive spirometry  Appreciate medical recs  Ortho will continue to follow   Will discuss plan with Dr. Carter and notify primary team of any changes to plan Pt is a 68M PMH HTN, HLD, BPH, Hx CVA (on home A81), L1-L4 Lami (Dr. Murray, '22) who was admitted to the hospital for seizure/sepsis workup. Ortho consulted for suspected small collections in posterior paraspinal soft tissue region L2-L3 seen on MRI LSp. States since his lami procedure in 2022, pt has had persistent lower back pain treated with injections, most recently on 4/2/2024. Denies radicular pain, recent injuries, falls, trauma, or any acute onset of new LBP worsening pain. At baseline, ambulates independently with weak L ankle dorsiflexion. Denies numbness, tingling, chills, CP, SOB, N/V/D, urinary/bowel incontinence.    Vital Signs (24 Hrs):  T(C): 36.7 (04-29-24 @ 13:34), Max: 37.2 (04-28-24 @ 20:02)  HR: 72 (04-29-24 @ 13:34) (72 - 75)  BP: 133/76 (04-29-24 @ 13:34) (133/76 - 147/79)  RR: 17 (04-29-24 @ 13:34) (17 - 19)  SpO2: 94% (04-29-24 @ 13:34) (93% - 95%)  Wt(kg): --    LABS:                          10.5   8.16  )-----------( 190      ( 29 Apr 2024 06:00 )             31.0     04-29    144  |  110<H>  |  10  ----------------------------<  108<H>  3.9   |  29  |  0.81    Ca    9.2      29 Apr 2024 06:00    TPro  6.2  /  Alb  2.8<L>  /  TBili  0.5  /  DBili  x   /  AST  16  /  ALT  37  /  AlkPhos  76  04-29    LIVER FUNCTIONS - ( 29 Apr 2024 06:00 )  Alb: 2.8 g/dL / Pro: 6.2 g/dL / ALK PHOS: 76 U/L / ALT: 37 U/L / AST: 16 U/L / GGT: x           Physical Exam:   General: NAD, patient laying in bed comfortably  Calves nontender  Compartments compressible, soft    Spine:  NTTP of C-,T-,L-Spine, no step offs    MOTOR EXAM:                          Elbow Flex (C5)     Wrist Ext (C6)     Elbow Ext (C7)      Finger Flex (C8)    Finger Abduction (T1)  RIGHT                 5/5                         5/5                         5/5                          5/5                              5/5  LEFT                    5/5                         5/5                         5/5                          5/5                              5/5                         Hip Flex (L2)      Knee Ext (L3)      Ank Dorsiflex (L4)     Hallux Ext (L5)     Ank PlantarFlex (S1)  RIGHT               5/5                      5/5                          5/5                            5/5                           5/5  LEFT                  5/5                      5/5                          4/5*                            5/5                           5/5  *baseline    SENSORY EXAM:                        C5      C6      C7      C8       T1          RIGHT          2         2        2         2         2          (0=absent, 1=impaired, 2=normal, NT=not testable)  LEFT             2         2        2         2         2          (0=absent, 1=impaired, 2=normal, NT=not testable)                      L2        L3       L4      L5       S1          RIGHT        2          2         2        2        2           (0=absent, 1=impaired, 2=normal, NT=not testable)  LEFT           2          2         2        2        2           (0=absent, 1=impaired, 2=normal, NT=not testable)    No pain with SLR, no clonus, no babinksi, no norris    MRI LSp: Small collections are suspected in the posterior paraspinal soft tissue region at the postop L2 and L3 levels.     A/P: 68M who presents with paraspinal small collections seen on MRI at L2-L3    Recommend MRI cervical/thoracic spine with/without contrast  Followup ESR/CRP  Continue to followup blood cultures  Recommend serial ESR/CRP   Continue ABx per ID recs  WBAT  PT/OT  Analgesics  DVT PPx per primary team  Incentive spirometry  Appreciate medical recs  Ortho will continue to follow   Will discuss plan with Dr. Carter and notify primary team of any changes to plan Pt is a 68M PMH HTN, HLD, BPH, Hx CVA (on home A81), L1-L4 Lami (Dr. Murray, '22) who was admitted to the hospital for seizure/sepsis workup. Ortho consulted for suspected small collections in posterior paraspinal soft tissue region L2-L3 seen on MRI LSp. States since his lami procedure in 2022, pt has had persistent lower back pain treated with injections, most recently on 4/2/2024. Denies radicular pain, recent injuries, falls, trauma, or any acute onset of new LBP worsening pain. At baseline, ambulates independently with weak L ankle dorsiflexion. Denies numbness, tingling, chills, CP, SOB, N/V/D, urinary/bowel incontinence.    Vital Signs (24 Hrs):  T(C): 36.7 (04-29-24 @ 13:34), Max: 37.2 (04-28-24 @ 20:02)  HR: 72 (04-29-24 @ 13:34) (72 - 75)  BP: 133/76 (04-29-24 @ 13:34) (133/76 - 147/79)  RR: 17 (04-29-24 @ 13:34) (17 - 19)  SpO2: 94% (04-29-24 @ 13:34) (93% - 95%)  Wt(kg): --    LABS:                          10.5   8.16  )-----------( 190      ( 29 Apr 2024 06:00 )             31.0     04-29    144  |  110<H>  |  10  ----------------------------<  108<H>  3.9   |  29  |  0.81    Ca    9.2      29 Apr 2024 06:00    TPro  6.2  /  Alb  2.8<L>  /  TBili  0.5  /  DBili  x   /  AST  16  /  ALT  37  /  AlkPhos  76  04-29    LIVER FUNCTIONS - ( 29 Apr 2024 06:00 )  Alb: 2.8 g/dL / Pro: 6.2 g/dL / ALK PHOS: 76 U/L / ALT: 37 U/L / AST: 16 U/L / GGT: x           Physical Exam:   General: NAD, patient laying in bed comfortably  Calves nontender  Compartments compressible, soft    Spine:  NTTP of C-,T-,L-Spine, no step offs    MOTOR EXAM:                          Elbow Flex (C5)     Wrist Ext (C6)     Elbow Ext (C7)      Finger Flex (C8)    Finger Abduction (T1)  RIGHT                 5/5                         5/5                         5/5                          5/5                              5/5  LEFT                    5/5                         5/5                         5/5                          5/5                              5/5                         Hip Flex (L2)      Knee Ext (L3)      Ank Dorsiflex (L4)     Hallux Ext (L5)     Ank PlantarFlex (S1)  RIGHT               5/5                      5/5                          5/5                            5/5                           5/5  LEFT                  5/5                      5/5                          4/5*                            5/5                           5/5  *baseline    SENSORY EXAM:                        C5      C6      C7      C8       T1          RIGHT          2         2        2         2         2          (0=absent, 1=impaired, 2=normal, NT=not testable)  LEFT             2         2        2         2         2          (0=absent, 1=impaired, 2=normal, NT=not testable)                      L2        L3       L4      L5       S1          RIGHT        2          2         2        2        2           (0=absent, 1=impaired, 2=normal, NT=not testable)  LEFT           2          2         2        2        2           (0=absent, 1=impaired, 2=normal, NT=not testable)    No pain with SLR, no clonus, no babinksi, no norris    MRI LSp: Small collections are suspected in the posterior paraspinal soft tissue region at the postop L2 and L3 levels.     A/P: 68M who presents with paraspinal small collections seen on MRI at L2-L3    Recommend MRI cervical/thoracic spine with/without contrast  Followup ESR/CRP  Continue to followup blood cultures, will alert patient if positive  Continue ABx per ID recs  WBAT  PT/OT  Analgesics  DVT PPx per primary team  Incentive spirometry  Appreciate medical recs  Pt is ortho stable for discharge with close followup in 1-3 days to arrange repeat imaging  Recommend repeat blood cultures as outpatient  Recommend weekly ESR/CRP   Ortho will follow if plans change and patient remains in house  Will discuss plan with Dr. Carter and notify primary team of any changes to plan

## 2024-04-29 NOTE — PROGRESS NOTE ADULT - PROBLEM SELECTOR PLAN 7
CT abdomen shows  There is a 2.0 cm exophytic mass arising from the posterior aspect of the   gastric antrum, suspicious for a gastrointestinal stromal tumor (GIST).  There is masslike enhancement of the right lateral prostate gland, which   could be due to an underlying prostate mass. Correlation with PSA is   recommended, and if elevated, prostate protocol MRI  To discuss with patient Chronic:  - c/w alfuzosin 10mg daily (interchange with tamsulosin)

## 2024-04-29 NOTE — PROGRESS NOTE ADULT - PROBLEM SELECTOR PLAN 2
On previous admission was found to have chronic lacunar infarct on MRI  - c/w aspirin 81mg daily and atorvastatin 40mg daily MRI L Spine-  There are new small areas of abnormal T1 and T2 prolongation seen just   posterior to the right facet joint at the L2 level (in the postop   region). This is best seen on series 6 images 19 and 20. This finding   measures approximately 1.1 x 0.7 cm. Evaluation of the surrounding soft   tissues appears unremarkable. Tiny subcentimeter focus of T2 prolongation   is also seen posterior to the right L3 facet joint. This finding measures   approximately 4.6 mm and is best seen on series 4 image 17 and series 6   image 33. The possibility of tiny multiloculated abscesses cannot be   entirely excluded.    IMPRESSION: Postop changes are identified.    Small collections are suspected in the posterior paraspinal soft tissue   region at the postop L2 and L3 levels. Clinical correlation and continued   close interval follow-up is recommended.  Pt to follow up with [facility] as ou pt -Repeat MRI L Spine with IV Contrast after 3 days for Follow up as out pt with DR Murray at [facility] Or Neuro Sx team as out pt

## 2024-04-29 NOTE — PROGRESS NOTE ADULT - PROBLEM SELECTOR PLAN 5
Chronic:  - c/w alfuzosin 10mg daily (interchange with tamsulosin) On previous admission, was diagnosed with new-onset seizure  - c/w Keppra 750mg BID

## 2024-04-29 NOTE — PROGRESS NOTE ADULT - PROBLEM SELECTOR PLAN 4
- c/w atorvastatin 40mg daily On previous admission was found to have chronic lacunar infarct on MRI  - c/w aspirin 81mg daily and atorvastatin 40mg daily

## 2024-04-29 NOTE — CONSULT NOTE ADULT - CONSULT REASON
UTI
Time consulted 1430  Time seen 1515  Problem: suspected small collections in Posterior Paraspinal Soft Tissue Region L2-L3

## 2024-04-29 NOTE — PROGRESS NOTE ADULT - PROBLEM SELECTOR PLAN 9
CT abdomen shows  There is a 2.0 cm exophytic mass arising from the posterior aspect of the   gastric antrum, suspicious for a gastrointestinal stromal tumor (GIST).  There is masslike enhancement of the right lateral prostate gland, which   could be due to an underlying prostate mass. Correlation with PSA is   recommended, and if elevated, prostate protocol MRI  To discuss with patient

## 2024-04-29 NOTE — PROGRESS NOTE ADULT - ASSESSMENT
68-year-old male with history of GERD, BPH presenting with fever to 102.8F at home and chills. Admitted for sepsis 2/2 UTI.   Meeting SIRS criteria with fever (T 100.8F), tachycardia  (), leukocytosis (WBC 13.58)  UA from 4/24 compared to UA from 4/27; UTI appears to be new  BCx/UCx NGTD  --On last admission (4/24), blood cultures with strep mitis/oralis in 1/4 bottles, delayed growth likely contamination  Lactate wnl, procalcitonin elevated at 0.50   Leukocytosis now downtrending  COVID/flu/RSV negative; full RVP negative  MRSA swab negative  CXR negative  TTE negative    CT showing cystitis, possible GIST, prostate mass    MR Spine showing Small collections are suspected in the posterior paraspinal soft tissue region at the postop L2 and L3 levels.   Clinical correlation and continued close interval follow-up is recommended. Pt w/ injections in this area  Spoke directly w/ radiologist--given the hx of injection it is unclear whether these are sterile collections that are reactive to the injection site OR possible early abscesses    Recommendations:     Re: MRI of back. Pt w/ injections in this area.  I spoke directly w/ radiologist Dr. Melvin--given the hx of injection it is unclear whether these are sterile collections that are reactive to the injection site OR possible early abscesses  If there was edema/enhancing collections, would be increasingly concerned for spinal collection, however there is no surrounding soft tissue swelling.   Recommend repeat imaging in 3-4 days to follow the collections.     I discussed w/ daughter Mandi possible plan.   If truly c/f infection of the back, we would subject the patient to 4-6 wks of IV Abx given proximity to spinal area/bone  I reviewed that placing on long term IV Abx at this stage w/o confirming true infection will put patient at risk of the adverse effects, such as GI upset or infection like C. diff.   Will c/s ortho spine for any input re: intervention if needed  I agree with radiologist that we should have periodic MRI of L-spine to monitor those collections.    Pt also currently had acute cystitis which we are treating and can explain the current constellation of symptoms he experienced on THIS admission.  I explained that sometimes if the bacterial load is not high, there are often times where the cultures can be negative, but patient still has acute cystitis based on urinalaysis and CT findings.   For this we can continue cefepime, and transition to cefpodoxime 200mg BID to complete x7 day course when ready for d/c    Patient will need outpatient ortho spine/NSGY f/u to monitor MRI findings.   D/w daughter Mandi on phone, agreed to plan    D/w Dr. Couch  Infectious Diseases will follow. Please call with any questions.  Nayely Couch M.D.  [facility] Division of Infectious Diseases [phone]  For after 5 P.M. and weekends, please call [phone]

## 2024-04-29 NOTE — CASE MANAGEMENT PROGRESS NOTE - NSCMPROGRESSNOTE_GEN_ALL_CORE
Patient discussed during rounds and remains acute. Patient with sepsis and receiving IV Maxipime. Pending blood cultures and MRI. CM will continue to collaborate with interdisciplinary team and remain available to assist.

## 2024-04-29 NOTE — CARE COORDINATION ASSESSMENT. - NSPASTMEDSURGHISTORY_GEN_ALL_CORE_FT
PAST MEDICAL & SURGICAL HISTORY:  Benign prostatic hyperplasia      Moderate mitral regurgitation      MVP (mitral valve prolapse)      Melanoma      GERD (gastroesophageal reflux disease)      Status post dilation of esophageal narrowing      S/P lumbar laminectomy      S/P hernia repair      History of BPH      GERD (gastroesophageal reflux disease)      History of seizure

## 2024-04-30 PROBLEM — Z87.898 PERSONAL HISTORY OF OTHER SPECIFIED CONDITIONS: Chronic | Status: ACTIVE | Noted: 2024-04-27

## 2024-04-30 LAB — CRP SERPL-MCNC: 37 MG/L — HIGH

## 2024-05-01 ENCOUNTER — APPOINTMENT (OUTPATIENT)
Dept: NEUROLOGY | Facility: CLINIC | Age: 69
End: 2024-05-01
Payer: MEDICARE

## 2024-05-01 VITALS
OXYGEN SATURATION: 97 % | DIASTOLIC BLOOD PRESSURE: 75 MMHG | BODY MASS INDEX: 25.68 KG/M2 | HEART RATE: 88 BPM | SYSTOLIC BLOOD PRESSURE: 126 MMHG | WEIGHT: 200 LBS

## 2024-05-01 DIAGNOSIS — Z85.9 PERSONAL HISTORY OF MALIGNANT NEOPLASM, UNSPECIFIED: ICD-10-CM

## 2024-05-01 DIAGNOSIS — R56.9 UNSPECIFIED CONVULSIONS: ICD-10-CM

## 2024-05-01 DIAGNOSIS — R41.3 OTHER AMNESIA: ICD-10-CM

## 2024-05-01 PROCEDURE — 99204 OFFICE O/P NEW MOD 45 MIN: CPT

## 2024-05-02 ENCOUNTER — APPOINTMENT (OUTPATIENT)
Dept: MRI IMAGING | Facility: CLINIC | Age: 69
End: 2024-05-02
Payer: MEDICARE

## 2024-05-02 LAB
CULTURE RESULTS: SIGNIFICANT CHANGE UP
CULTURE RESULTS: SIGNIFICANT CHANGE UP
SPECIMEN SOURCE: SIGNIFICANT CHANGE UP
SPECIMEN SOURCE: SIGNIFICANT CHANGE UP

## 2024-05-02 PROCEDURE — A9585: CPT | Mod: JW

## 2024-05-02 PROCEDURE — 72158 MRI LUMBAR SPINE W/O & W/DYE: CPT

## 2024-05-02 RX ORDER — LEVETIRACETAM 750 MG/1
750 TABLET, FILM COATED ORAL TWICE DAILY
Qty: 60 | Refills: 1 | Status: ACTIVE | COMMUNITY
Start: 2024-05-02 | End: 1900-01-01

## 2024-05-03 ENCOUNTER — APPOINTMENT (OUTPATIENT)
Dept: MRI IMAGING | Facility: CLINIC | Age: 69
End: 2024-05-03

## 2024-05-06 NOTE — ASSESSMENT
[FreeTextEntry1] : 67 yo man with new onset GTC seizure, uncertain etiology.  The seizure was followed by fever and malaise soon afterward, but was treated for UTI and feels better now (ie meningitis/encephalitis less likely).  LP not performed although would be risky to perform if the fluid collection in lumbar region on MRI is infected (patient is following up with his spine surgeon to evaluate this).    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  Etiology of recurrent events he has been having over the past several months is uncertain (episodes of feeling warm, nauseous, vertiginous).   Wife concerned about patient's memory impairment (long term, not short term memory).  Plan: 1. Continue Keppra 750mg PO BID for now 2. Diagnostic video EEG monitoring in EMU (discontinue Keppra during monitoring) to monitor for interictal epileptiform discharges and/or clinical seizures.  This is medically necessary to confirm diagnosis and guide treatment.  3. MRI brain c/s gadolinium to rule out metastic process related to melanoma history 4. Paraneoplastic antibody panel (drawn in office today, result pending) 5. Seizure precautions, including no driving or operating heavy equipment, no unsupervised swimming, using a shower instead of a bathtub, no climbing ladders or working at elevations, no use of sharp dangerous tools or devices.  6. Labs: B12, MMA, folate, homocysteine, TSH 7. Patient agrees with plan.  8. Follow up after testing completed.   Darrin Woody MD [facility]  Greater than 50% of the encounter was spent on counseling and coordination of care discussing differential diagnosis, diagnostic testing, and treatment options. We have talked about appropriate follow up, and I have spent 45 minutes of face to face time with the patient.  More than 50% of time spent counseling and educating patient about epilepsy specific safety issues including ASM side effects and interactions, alcohol consumption, sleep deprivation, risks and driving privileges associated with the [place] Guidelines, what is SUDEP and death related to seizures/SUDEP, seizure 1st aid and risks. Patient is educated on seizure precautions, including no driving, no operating machinery, no swimming or bathing, no climbing heights, or engage in any risky activities during which a seizure could cause further injury to pt or others.

## 2024-05-06 NOTE — HISTORY OF PRESENT ILLNESS
[FreeTextEntry1] : 69 yo man recently admitted to [facility] after having a new onset GTC seizure.  He was started on Keppra 500mg BID.  MRI brain did not reveal any etiology and EEG was normal.   He presented back to the ER shortly after with fever and chills, and was treated for a UTI.  MRI of the lumbar spine showed small fluid collections in the paraspinal soft tissues in the lumbar region, and ortho was consulted as patient has a history of lumbar surgery done in 2022.  They recommended close follow up as outpatient and he'll be seeing his surgeon in a few days.    The seizure occurred last Wednesday.  He walked his dog in the morning and went back to bed.  His wife heard a loud yell and found him having a convulsive seizure, which lasted a few minutes.  By the time the paramedics arrived, he was postictal and combative.  He recalls waking up in the ER.  He had bilateral tongue injuries.  No symptoms leading up to the seizure.  However, over the past 5 months, he has been having brief episodes every 1-2 weeks during which he suddenly feels very warm and nauseous x 5 seconds, then it passes.  This past Saturday, he had a similar episode during which he felt warm and nauseous, and then had vertigo for about a minute.  No associated loss of conscoiusness or disorientation.    Since being discharged from the hospital he feels much better, and is now on a course of oral antibiotics, will be seeing his PCP for follow up soon.  PMHx: melanoma 2002 [requiring neck surgery, lymph node dissection, treated at [facility]], GERD  He is a retired .  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  He is very active and drives, but has not been driving since the seizure.

## 2024-05-06 NOTE — PHYSICAL EXAM
[FreeTextEntry1] : Awake, alert, oriented x 3.  Language fluent.  Comprehension intact.  Naming intact.  Repetition intact.  Affect normal.  Cranial nerves grossly intact.  Motor exam: normal bulk, normal tone, 5/5 in all four extremities.  No tremors or fasciculations.  Sensory exam: intact to LT/PP/NATALIIA/vibration.  DTRs: 2+ throughout, flexor plantar response bilaterally, no clonus.  Coordination: no dysmetria.  Gait: normal toe/heel/tandem gait.  Romberg - negative

## 2024-05-07 PROBLEM — M48.07 LUMBOSACRAL STENOSIS: Status: ACTIVE | Noted: 2022-01-31

## 2024-05-08 ENCOUNTER — APPOINTMENT (OUTPATIENT)
Dept: MRI IMAGING | Facility: CLINIC | Age: 69
End: 2024-05-08
Payer: MEDICARE

## 2024-05-08 ENCOUNTER — APPOINTMENT (OUTPATIENT)
Dept: ORTHOPEDIC SURGERY | Facility: CLINIC | Age: 69
End: 2024-05-08
Payer: MEDICARE

## 2024-05-08 ENCOUNTER — INPATIENT (INPATIENT)
Facility: HOSPITAL | Age: 69
LOS: 1 days | Discharge: HOME | DRG: 101 | End: 2024-05-10
Attending: PSYCHIATRY & NEUROLOGY | Admitting: PSYCHIATRY & NEUROLOGY
Payer: MEDICARE

## 2024-05-08 VITALS
RESPIRATION RATE: 18 BRPM | DIASTOLIC BLOOD PRESSURE: 91 MMHG | HEART RATE: 85 BPM | SYSTOLIC BLOOD PRESSURE: 147 MMHG | OXYGEN SATURATION: 99 % | TEMPERATURE: 98 F

## 2024-05-08 DIAGNOSIS — M48.07 SPINAL STENOSIS, LUMBOSACRAL REGION: ICD-10-CM

## 2024-05-08 DIAGNOSIS — Z98.890 OTHER SPECIFIED POSTPROCEDURAL STATES: Chronic | ICD-10-CM

## 2024-05-08 DIAGNOSIS — G40.909 EPILEPSY, UNSPECIFIED, NOT INTRACTABLE, WITHOUT STATUS EPILEPTICUS: ICD-10-CM

## 2024-05-08 LAB
ALBUMIN SERPL ELPH-MCNC: 4.5 G/DL — SIGNIFICANT CHANGE UP (ref 3.3–5)
ALP SERPL-CCNC: 79 U/L — SIGNIFICANT CHANGE UP (ref 40–120)
ALT FLD-CCNC: 34 U/L — SIGNIFICANT CHANGE UP (ref 10–45)
AMPHET UR-MCNC: NEGATIVE — SIGNIFICANT CHANGE UP
ANION GAP SERPL CALC-SCNC: 13 MMOL/L — SIGNIFICANT CHANGE UP (ref 5–17)
APPEARANCE UR: CLEAR — SIGNIFICANT CHANGE UP
AST SERPL-CCNC: 24 U/L — SIGNIFICANT CHANGE UP (ref 10–40)
BACTERIA # UR AUTO: NEGATIVE /HPF — SIGNIFICANT CHANGE UP
BARBITURATES UR SCN-MCNC: NEGATIVE — SIGNIFICANT CHANGE UP
BENZODIAZ UR-MCNC: NEGATIVE — SIGNIFICANT CHANGE UP
BILIRUB SERPL-MCNC: 0.3 MG/DL — SIGNIFICANT CHANGE UP (ref 0.2–1.2)
BILIRUB UR-MCNC: NEGATIVE — SIGNIFICANT CHANGE UP
BUN SERPL-MCNC: 20 MG/DL — SIGNIFICANT CHANGE UP (ref 7–23)
CALCIUM SERPL-MCNC: 9.3 MG/DL — SIGNIFICANT CHANGE UP (ref 8.4–10.5)
CAST: 0 /LPF — SIGNIFICANT CHANGE UP (ref 0–4)
CHLORIDE SERPL-SCNC: 105 MMOL/L — SIGNIFICANT CHANGE UP (ref 96–108)
CO2 SERPL-SCNC: 25 MMOL/L — SIGNIFICANT CHANGE UP (ref 22–31)
COCAINE METAB.OTHER UR-MCNC: NEGATIVE — SIGNIFICANT CHANGE UP
COLOR SPEC: YELLOW — SIGNIFICANT CHANGE UP
CREAT SERPL-MCNC: 0.88 MG/DL — SIGNIFICANT CHANGE UP (ref 0.5–1.3)
DIFF PNL FLD: NEGATIVE — SIGNIFICANT CHANGE UP
EGFR: 94 ML/MIN/1.73M2 — SIGNIFICANT CHANGE UP
GLUCOSE SERPL-MCNC: 97 MG/DL — SIGNIFICANT CHANGE UP (ref 70–99)
GLUCOSE UR QL: NEGATIVE MG/DL — SIGNIFICANT CHANGE UP
HCT VFR BLD CALC: 35 % — LOW (ref 39–50)
HGB BLD-MCNC: 11.6 G/DL — LOW (ref 13–17)
KETONES UR-MCNC: NEGATIVE MG/DL — SIGNIFICANT CHANGE UP
LEUKOCYTE ESTERASE UR-ACNC: NEGATIVE — SIGNIFICANT CHANGE UP
MAGNESIUM SERPL-MCNC: 2 MG/DL — SIGNIFICANT CHANGE UP (ref 1.6–2.6)
MCHC RBC-ENTMCNC: 27.9 PG — SIGNIFICANT CHANGE UP (ref 27–34)
MCHC RBC-ENTMCNC: 33.1 GM/DL — SIGNIFICANT CHANGE UP (ref 32–36)
MCV RBC AUTO: 84.1 FL — SIGNIFICANT CHANGE UP (ref 80–100)
METHADONE UR-MCNC: NEGATIVE — SIGNIFICANT CHANGE UP
NITRITE UR-MCNC: NEGATIVE — SIGNIFICANT CHANGE UP
NRBC # BLD: 0 /100 WBCS — SIGNIFICANT CHANGE UP (ref 0–0)
OPIATES UR-MCNC: NEGATIVE — SIGNIFICANT CHANGE UP
OXYCODONE UR-MCNC: NEGATIVE — SIGNIFICANT CHANGE UP
PCP SPEC-MCNC: SIGNIFICANT CHANGE UP
PCP UR-MCNC: NEGATIVE — SIGNIFICANT CHANGE UP
PH UR: 6 — SIGNIFICANT CHANGE UP (ref 5–8)
PLATELET # BLD AUTO: 314 K/UL — SIGNIFICANT CHANGE UP (ref 150–400)
POTASSIUM SERPL-MCNC: 4 MMOL/L — SIGNIFICANT CHANGE UP (ref 3.5–5.3)
POTASSIUM SERPL-SCNC: 4 MMOL/L — SIGNIFICANT CHANGE UP (ref 3.5–5.3)
PROT SERPL-MCNC: 7.2 G/DL — SIGNIFICANT CHANGE UP (ref 6–8.3)
PROT UR-MCNC: NEGATIVE MG/DL — SIGNIFICANT CHANGE UP
RBC # BLD: 4.16 M/UL — LOW (ref 4.2–5.8)
RBC # FLD: 13 % — SIGNIFICANT CHANGE UP (ref 10.3–14.5)
RBC CASTS # UR COMP ASSIST: 0 /HPF — SIGNIFICANT CHANGE UP (ref 0–4)
SODIUM SERPL-SCNC: 143 MMOL/L — SIGNIFICANT CHANGE UP (ref 135–145)
SP GR SPEC: 1.01 — SIGNIFICANT CHANGE UP (ref 1–1.03)
SQUAMOUS # UR AUTO: 0 /HPF — SIGNIFICANT CHANGE UP (ref 0–5)
THC UR QL: NEGATIVE — SIGNIFICANT CHANGE UP
UROBILINOGEN FLD QL: 0.2 MG/DL — SIGNIFICANT CHANGE UP (ref 0.2–1)
WBC # BLD: 8.47 K/UL — SIGNIFICANT CHANGE UP (ref 3.8–10.5)
WBC # FLD AUTO: 8.47 K/UL — SIGNIFICANT CHANGE UP (ref 3.8–10.5)
WBC UR QL: 0 /HPF — SIGNIFICANT CHANGE UP (ref 0–5)

## 2024-05-08 PROCEDURE — 99214 OFFICE O/P EST MOD 30 MIN: CPT

## 2024-05-08 PROCEDURE — 70553 MRI BRAIN STEM W/O & W/DYE: CPT

## 2024-05-08 PROCEDURE — 95720 EEG PHY/QHP EA INCR W/VEEG: CPT

## 2024-05-08 PROCEDURE — A9585: CPT

## 2024-05-08 RX ORDER — ASPIRIN/CALCIUM CARB/MAGNESIUM 324 MG
81 TABLET ORAL DAILY
Refills: 0 | Status: DISCONTINUED | OUTPATIENT
Start: 2024-05-08 | End: 2024-05-10

## 2024-05-08 RX ORDER — TAMSULOSIN HYDROCHLORIDE 0.4 MG/1
0.8 CAPSULE ORAL AT BEDTIME
Refills: 0 | Status: DISCONTINUED | OUTPATIENT
Start: 2024-05-08 | End: 2024-05-10

## 2024-05-08 RX ORDER — FERROUS SULFATE 325(65) MG
325 TABLET ORAL AT BEDTIME
Refills: 0 | Status: DISCONTINUED | OUTPATIENT
Start: 2024-05-08 | End: 2024-05-10

## 2024-05-08 RX ORDER — INFLUENZA VIRUS VACCINE 15; 15; 15; 15 UG/.5ML; UG/.5ML; UG/.5ML; UG/.5ML
0.7 SUSPENSION INTRAMUSCULAR ONCE
Refills: 0 | Status: COMPLETED | OUTPATIENT
Start: 2024-05-08 | End: 2024-05-08

## 2024-05-08 RX ORDER — LEVETIRACETAM 250 MG/1
375 TABLET, FILM COATED ORAL ONCE
Refills: 0 | Status: COMPLETED | OUTPATIENT
Start: 2024-05-08 | End: 2024-05-08

## 2024-05-08 RX ORDER — ATORVASTATIN CALCIUM 80 MG/1
40 TABLET, FILM COATED ORAL AT BEDTIME
Refills: 0 | Status: DISCONTINUED | OUTPATIENT
Start: 2024-05-08 | End: 2024-05-10

## 2024-05-08 RX ORDER — PANTOPRAZOLE SODIUM 20 MG/1
20 TABLET, DELAYED RELEASE ORAL AT BEDTIME
Refills: 0 | Status: DISCONTINUED | OUTPATIENT
Start: 2024-05-08 | End: 2024-05-10

## 2024-05-08 RX ORDER — FINASTERIDE 5 MG/1
5 TABLET, FILM COATED ORAL AT BEDTIME
Refills: 0 | Status: DISCONTINUED | OUTPATIENT
Start: 2024-05-08 | End: 2024-05-10

## 2024-05-08 RX ORDER — CHOLECALCIFEROL (VITAMIN D3) 125 MCG
2000 CAPSULE ORAL AT BEDTIME
Refills: 0 | Status: DISCONTINUED | OUTPATIENT
Start: 2024-05-08 | End: 2024-05-10

## 2024-05-08 RX ADMIN — Medication 325 MILLIGRAM(S): at 21:10

## 2024-05-08 RX ADMIN — ATORVASTATIN CALCIUM 40 MILLIGRAM(S): 80 TABLET, FILM COATED ORAL at 21:11

## 2024-05-08 RX ADMIN — TAMSULOSIN HYDROCHLORIDE 0.8 MILLIGRAM(S): 0.4 CAPSULE ORAL at 21:11

## 2024-05-08 RX ADMIN — FINASTERIDE 5 MILLIGRAM(S): 5 TABLET, FILM COATED ORAL at 21:11

## 2024-05-08 RX ADMIN — Medication 2000 UNIT(S): at 21:10

## 2024-05-08 RX ADMIN — LEVETIRACETAM 375 MILLIGRAM(S): 250 TABLET, FILM COATED ORAL at 18:47

## 2024-05-08 RX ADMIN — PANTOPRAZOLE SODIUM 20 MILLIGRAM(S): 20 TABLET, DELAYED RELEASE ORAL at 21:12

## 2024-05-08 NOTE — HISTORY OF PRESENT ILLNESS
[de-identified] : Today the patient states that he is recovering from a seizure episode approximately 2 weeks ago.  He had complications in terms of infection and sepsis.  Thankfully since that admission to [facility] he has overall recovered clinically.  He is being followed by neurologist for his seizure.  He denies any current bowel bladder issues.  He denies any saddle anesthesia.  He does describe intermittent buttock pain.

## 2024-05-08 NOTE — H&P ADULT - NSICDXPASTMEDICALHX_GEN_ALL_CORE_FT
PAST MEDICAL HISTORY:  Benign prostatic hyperplasia     GERD (gastroesophageal reflux disease)     GERD (gastroesophageal reflux disease)     History of BPH     History of seizure     Melanoma     Moderate mitral regurgitation     MVP (mitral valve prolapse)

## 2024-05-08 NOTE — H&P ADULT - NSHPLABSRESULTS_GEN_ALL_CORE
MRI brain ww/o 5/8/24  No acute infarction, intracranial hemorrhage or mass lesion. No abnormal intracranial enhancement is identified.  Small chronic lacunar infarction in the right cerebellum is again seen. No associated enhancement/vasculature.  The hippocampi appear unremarkable. There is no evidence for congenital malformation/developmental anomaly.  No hydrocephalus. No extra-axial fluid collections. The skull base flow voids are present.  The visualized intraorbital contents are unremarkable. Trace mucosal thickening in the left maxillary sinus. The mastoid air cells are clear. The visualized osseous structures, soft tissues and partially visualized parotid glands appear normal.    IMPRESSION:  No acute infarct, hemorrhage, or mass lesion. No abnormal intracranial enhancement.  Small chronic lacunar infarct in the right cerebellum again seen.    TTE 4/28/24   1. Left ventricular systolic function is normal with an ejection fraction visually estimated at 50 to 55 %.   2. There is normal LV mass and normal geometry.   3. There is mild (grade 1) left ventricular diastolic dysfunction.   4. Normal right ventricular cavity size, with normal wall thickness, and normal systolic function.   5. The left atrium is moderately dilated.   6. The right atrium is mildly dilated.   7. Prolapse of the anterior mitral leaflet and prolapse of the posterior mitral leaflet.   8. Mild mitral regurgitation.   9. Trileaflet aortic valve with normal systolic excursion.

## 2024-05-08 NOTE — CONSULT NOTE ADULT - SUBJECTIVE AND OBJECTIVE BOX
68yM with hx of TIA, melanoma, GERD, admitted for workup of new-onset seizure.  He underwent CT A/P for fever workup approximately 2 weeks ago, with incidental finding of a discrete, 2cm mass of the posterior wall of the stomach, consistent with likely GIST.  His gastroenterologist, Dr. Jordan, asked me to see the patient for operative planning.    Patient reports he currently feels well and has no abdominal pain or PO intolerance.    PSHx neck dissection for melanoma, and lumbar spine surgery

## 2024-05-08 NOTE — H&P ADULT - ASSESSMENT
Patient presents with  concern for convulsive episode followed by confusion, i/s/o possible UTI in patient with no hx of seizure, hx of melanoma.     Impression: EMU elective admission for spell characterization    Plan  [] Check Keppra level, CBC, CMP, Mg, Phos, urinalysis, drug and toxicology screen  [] Keppra 375mg for tonight, then monitor off ASM  [] vEEG  [] Seizure, fall and aspiration precautions. Avoid sleep deprivation.   [] IV lorazepam 1mg IV prn for GTC seizure activity lasting >3min with vital changes; repeat after 1 minute (max dose 0.1 mg/kg)     [x] Patient underwent MRI w/wo and had paraneoplastic panel sent by outpatient provider    [] Given concern for seizure, advise the patient with regards to risks and driving privileges associated with the [place] Guidelines. Advise patient regarding the risk of seizures and general seizure safety recommendations including not to be bathing alone, climbing to high places and operating heavy machinery, until cleared by follow-up outpatient Neurology. Reinforce the importance of compliance with medications. Discuss sleep hygiene and the risks of sleep disruption. Discuss the risk of death associated with seizures / SUDEP.     [] Please note: if patient has a convulsion, please document length of episode, specifically what patient was doing paying attention to eye opening vs closure, gaze deviation, shaking of extremities, tongue bite, urinary incontinence, any derangement of vital signs.       * Case and plan not final until Attending attestation * Patient presents with  concern for convulsive episode followed by confusion, i/s/o possible UTI in patient with no hx of seizure, hx of melanoma.     Impression: EMU elective admission for spell characterization    Plan  [] Check Keppra level, CBC, CMP, Mg, Phos, urinalysis, drug and toxicology screen  [] Keppra 375mg for tonight, then monitor off ASM  [] vEEG  [] Seizure, fall and aspiration precautions. Avoid sleep deprivation.   [] IV lorazepam 1mg IV prn for GTC seizure activity lasting >3min with vital changes; repeat after 1 minute (max dose 0.1 mg/kg)     [x] MRI w/wo, small chronic lacunar infarct in the right cerebellum   [x] paraneoplastic panel sent by outpatient provider    [] Given concern for seizure, advise the patient with regards to risks and driving privileges associated with the [place] Guidelines. Advise patient regarding the risk of seizures and general seizure safety recommendations including not to be bathing alone, climbing to high places and operating heavy machinery, until cleared by follow-up outpatient Neurology. Reinforce the importance of compliance with medications. Discuss sleep hygiene and the risks of sleep disruption. Discuss the risk of death associated with seizures / SUDEP.     [] Please note: if patient has a convulsion, please document length of episode, specifically what patient was doing paying attention to eye opening vs closure, gaze deviation, shaking of extremities, tongue bite, urinary incontinence, any derangement of vital signs.       * Case and plan not final until Attending attestation *

## 2024-05-08 NOTE — PATIENT PROFILE ADULT - FALL HARM RISK - HARM RISK INTERVENTIONS

## 2024-05-08 NOTE — H&P ADULT - NSHPPHYSICALEXAM_GEN_ALL_CORE
Neurologic Exam:  Mental status - Awake, Alert, Oriented to person, place, and time. Speech fluent, repetition and naming intact. Follows simple and complex commands.     Cranial nerves - PRRL, VFF, EOMI, face sensation (V1-V3) intact b/l, facial strength intact without asymmetry b/l, hearing intact b/l, palate with symmetric elevation, trapezius 5/5 strength b/l, tongue midline on protrusion with full lateral movement    Motor - Normal bulk and tone throughout. No pronator drift.  Strength testing            Deltoid      Biceps      Triceps     Wrist Extension    Wrist Flexion     Interossei         R            5                 5               5                     5                              5                        5                 5  L             5                 5               5                     5                              5                        5                 5              Hip Flexion       Knee Flexion    Knee Extension    Dorsiflexion    Plantar Flexion  R              5                                        5                           5                            5                          5  L              5                                   5                           5                            5                          5    Sensation - Light touch/temperature OR pain/vibration intact throughout    DTR's -             Biceps      Triceps     Brachioradialis      Patellar    Ankle    Toes/plantar response  R             2+             2+                  2+                       2+            2+                 Down  L              2+             2+                 2+                        2+           2+                 Down    Coordination - Finger to Nose intact b/l. No tremors appreciated    Gait and station - Normal casual gait. Romberg (-) Neurologic Exam:  Mental status - Awake, Alert, Oriented to person, place, and time. Speech fluent, repetition and naming intact. Follows simple commands.     Cranial nerves - PRRL, VFF, EOMI, face sensation (V1-V3) intact b/l, facial strength intact without asymmetry b/l, hearing intact b/l, palate with symmetric elevation, trapezius 5/5 strength b/l, tongue midline on protrusion with full lateral movement    Motor - Normal bulk and tone throughout. No pronator drift.  Strength testing            Deltoid      Biceps      Triceps           R            5                 5               5                     5                               L             5                 5               5                     5                                      Hip Flexion       Knee Flexion    Knee Extension    Dorsiflexion    Plantar Flexion  R              5                                        5                           5                            5                          5  L              5                                   5                           5                            5                          5    Sensation - Light touch intact throughout    DTR's -             Biceps      Triceps     Brachioradialis      Patellar    Ankle    Toes/plantar response  R             2+             2+                  2+                       2+            2+                 Down  L              2+             2+                 2+                        2+           2+                 Down    Coordination - Finger to Nose intact b/l. No tremors appreciated    Gait and station - Normal casual gait.

## 2024-05-08 NOTE — H&P ADULT - NSHPREVIEWOFSYSTEMS_GEN_ALL_CORE
Review of Systems:    CONSTITUTIONAL: No current fevers or chills  EYES AND ENT: No visual changes or no throat pain   NECK: No pain or stiffness  RESPIRATORY: No hemoptysis or shortness of breath  CARDIOVASCULAR: No chest pain or palpitations  GASTROINTESTINAL: No melena or hematochezia  GENITOURINARY: No dysuria or hematuria  NEUROLOGICAL: +As stated in HPI above  SKIN: No itching, burning, rashes, or lesions   All other review of systems is negative unless indicated above.

## 2024-05-08 NOTE — PATIENT PROFILE ADULT - HAVE YOU BEEN EATING POORLY BECAUSE OF A DECREASED APPETITE?
Initiate Treatment: I recommended a broad spectrum sunscreen with a SPF of 30 or higher.  I explained that SPF 30 sunscreens block approximately 97 percent of the sun's harmful rays.  Sunscreens should be applied at least 15 minutes prior to expected sun exposure and then every 2 hours after that as long as sun exposure continues. If swimming or exercising sunscreen should be reapplied every 45 minutes to an hour after getting wet or sweating.  One ounce, or the equivalent of a shot glass full of sunscreen, is adequate to protect the skin not covered by a bathing suit. I also recommended a lip balm with a sunscreen as well. Sun protective clothing can be used in lieu of sunscreen but must be worn the entire time you are exposed to the sun's rays.
Detail Level: Generalized
No (0)

## 2024-05-08 NOTE — PHYSICAL EXAM
[de-identified] : Lumbar Physical Exam  Gait - Normal  Station - Normal  Sagittal balance - Normal  Compensatory mechanism? - None  Heel walk - Normal  Toe walk - Normal  Reflexes Patellar - normal Gastroc - normal Clonus - No  Hip Exam - Normal  Straight leg raise - none  Pulses - 2+ dp/pt  Range of motion - normal  Sensation  Sensation intact to light touch in L1, L2, L3, L4, L5 and S1 dermatomes bilaterally, left foot numbness  Motor 	IP	Quad	HS	TA	Gastroc	EHL Right	5/5	5/5	5/5	5/5	5/5	5/5 Left	5/5	5/5	5/5	5/5	5/5	5/5  The patient's incision is clean dry and intact   [de-identified] : Lumbar radiographs Coronal degenerative scoliosis noted No severe spondylolisthesis  Lumbar MRI Severe spinal stenosis at L1-L2 L2-L4 with moderate to severe areas of spinal stenosis  Lumbar MRI with and without contrast reviewed Central decompression is complete from L1 down to L5 There are residual areas of foraminal stenosis to be expected after central decompression  New lumbar MRI with and without contrast reviewed Central decompression is complete There are some areas of residual lateral recess stenosis, foraminal stenosis  2 MRIs from 2024 were reviewed I see no areas of critical central stenosis He does have foraminal stenosis consistent with his limited decompression procedure I see no obvious signs of infection based on these 2 MRIs

## 2024-05-08 NOTE — H&P ADULT - HISTORY OF PRESENT ILLNESS
69 yo man past medical history of stroke/TIA with no residual symptoms, HLD, GERD, BPH, melanoma (2002, required neck surgery + LN dissection, treated at [facility] [place]) and new onset seizure presenting to [facility] EMU as an elective admission. Patient was seen by by Dr. Woody in the outpatient setting on 5/1/24. Per chart review, patient had presented to [facility] 4/24-4/25  for seizure-like activity. During hospital course, patient was started on atorvastatin, ASA, and keppra 500 mg BID. Patient then presented again to the hospital on 4/27 for a fever, was noted to be febrile to 102.8. Patient was then admitted and worked up for UTI. CT scan showed cystitis.  MRI of the lumbar spine showed small fluid collections in the paraspinal soft tissues in the lumbar region, and ortho was consulted as patient has a history of lumbar surgery done in 2022.     Seizure episode on 4/24 was described as follows:- "He walked his dog in the morning and went back to bed. His wife heard a loud yell and found him having a convulsive seizure, which lasted a few minutes. By the time the paramedics arrived, he was postictal and combative. He recalls waking up in the ER. He had bilateral tongue injuries. No symptoms leading up to the seizure. However, over the past 5 months, he has been having brief episodes every 1-2 weeks during which he suddenly feels very warm and nauseous x 5 seconds, then it passes. This past Saturday, he had a similar episode during which he felt warm and nauseous, and then had vertigo for about a minute. No associated loss of consciousness or disorientation."    PMHx: melanoma 2002 [requiring neck surgery, lymph node dissection, treated at [facility]], GERD    Social history:  He is a retired . 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 He is very active and drives, but has not been driving since the seizure.     Allergies:  penicillin (Rash)  penicillin (Hives)                       69 yo man past medical history of stroke/TIA with no residual symptoms, HLD, GERD, BPH, melanoma (2002, required neck surgery + LN dissection, treated at [facility]) and new onset seizure presenting to [facility] EMU as an elective admission. Patient was seen by Dr. Woody in the outpatient setting on 5/1/24. Per chart review, patient had presented to [facility] 4/24-4/25  for seizure-like activity. Patient reports he was sleeping in bed when around 9am his wife heard him scream. She wasn't in the bedroom at the time so she rushed in and found him in bed.  She denies shaking but she saw frothing of the mouth. She called EMS and it was about a little under 10 minutes before EMS arrived. She reports EMS telling her he became combative and they had to use restraints to get him in the gurney. Patient has no memory of the event and his first memory is the restraints which he thought was a dream. He had bilateral tongue injuries. At the hospital he was told he had "an old stroke on imaging" so he was started on atorvastatin, ASA, and keppra 500 mg BID. They also placed a holter monitor on him for which he still has on. Patient then presented again to the hospital on 4/27 for a fever, was noted to be febrile to 102.8. Patient was then admitted and worked up for UTI. CT scan showed cystitis.  MRI of the lumbar spine showed small fluid collections in the paraspinal soft tissues in the lumbar region, and ortho was consulted as patient has a history of lumbar surgery done in 2022. No symptoms leading up to the seizure. However, over the past 5 months, he has been having brief episodes every 1-2 weeks during which he suddenly feels very warm and nauseous x 5 seconds, then it passes. This past Saturday, he had a similar episode during which he felt warm and nauseous, and then had vertigo for about a minute. No associated loss of consciousness or disorientation." Today he had an MRI preformed prior to admission.    PMHx: melanoma 2002 [requiring neck surgery, lymph node dissection, treated at [facility]], GERD    Social history:  He is a retired . 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 He is very active and drives, but has not been driving since the seizure.     Allergies:  penicillin (Rash)  penicillin (Hives)    Home medications  Atorvastatin 40mg nightly  aspirin 81mg daily  finasteride 5mg nightly  alfuzosin 10mg nightly  nexium 30mg nightly  Fe 65 nightly  Vitamin D3 nightly    keppra 750mg bid

## 2024-05-08 NOTE — H&P ADULT - ATTENDING COMMENTS
Patient personally seen and examined - EEG with right FT discharges.  Consistent with diagnosis of right anterior focal epilepsy.  Will monitor one day off medications to see if any typical auras.  Will load back on medication tomorrow morning and continue LEV 750mg bid.  No driving per DMV guidelines.

## 2024-05-08 NOTE — ASSESSMENT
[FreeTextEntry1] : I had a long discussion with the patient regarding his treatment plan and diagnosis.  We discussed pros and cons of various treatment methods.  At this point I think he should continue to follow-up with neurology to discuss reasons for his unprovoked seizures.  I do not see an indication to rush him back to the operating room for any sort of I&D procedure.  Rather he should get his seizures worked up appropriately with neurology.  We will continue to follow him closely.  I have informed come back in 2 weeks.

## 2024-05-08 NOTE — CONSULT NOTE ADULT - ASSESSMENT
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    I have recommended elective laparoscopic resection of the mass. Discussed with the patient and his wife the nature of the surgery, risks and benefits, and expected recovery.  He will f/u with me in the office for surgical planning.  He will undergo upper endoscopy with possible tattooing of the lesion.

## 2024-05-09 ENCOUNTER — TRANSCRIPTION ENCOUNTER (OUTPATIENT)
Age: 69
End: 2024-05-09

## 2024-05-09 LAB
ANION GAP SERPL CALC-SCNC: 14 MMOL/L — SIGNIFICANT CHANGE UP (ref 5–17)
BASOPHILS # BLD AUTO: 0.05 K/UL — SIGNIFICANT CHANGE UP (ref 0–0.2)
BASOPHILS NFR BLD AUTO: 0.6 % — SIGNIFICANT CHANGE UP (ref 0–2)
BUN SERPL-MCNC: 17 MG/DL — SIGNIFICANT CHANGE UP (ref 7–23)
CALCIUM SERPL-MCNC: 9.6 MG/DL — SIGNIFICANT CHANGE UP (ref 8.4–10.5)
CHLORIDE SERPL-SCNC: 104 MMOL/L — SIGNIFICANT CHANGE UP (ref 96–108)
CHOLEST SERPL-MCNC: 119 MG/DL — SIGNIFICANT CHANGE UP
CO2 SERPL-SCNC: 24 MMOL/L — SIGNIFICANT CHANGE UP (ref 22–31)
CREAT SERPL-MCNC: 0.84 MG/DL — SIGNIFICANT CHANGE UP (ref 0.5–1.3)
EGFR: 96 ML/MIN/1.73M2 — SIGNIFICANT CHANGE UP
EOSINOPHIL # BLD AUTO: 0.32 K/UL — SIGNIFICANT CHANGE UP (ref 0–0.5)
EOSINOPHIL NFR BLD AUTO: 4 % — SIGNIFICANT CHANGE UP (ref 0–6)
GLUCOSE SERPL-MCNC: 82 MG/DL — SIGNIFICANT CHANGE UP (ref 70–99)
HCT VFR BLD CALC: 35.5 % — LOW (ref 39–50)
HDLC SERPL-MCNC: 43 MG/DL — SIGNIFICANT CHANGE UP
HGB BLD-MCNC: 11.7 G/DL — LOW (ref 13–17)
IMM GRANULOCYTES NFR BLD AUTO: 1.1 % — HIGH (ref 0–0.9)
LIPID PNL WITH DIRECT LDL SERPL: 62 MG/DL — SIGNIFICANT CHANGE UP
LYMPHOCYTES # BLD AUTO: 2.41 K/UL — SIGNIFICANT CHANGE UP (ref 1–3.3)
LYMPHOCYTES # BLD AUTO: 30.1 % — SIGNIFICANT CHANGE UP (ref 13–44)
MAGNESIUM SERPL-MCNC: 2 MG/DL — SIGNIFICANT CHANGE UP (ref 1.6–2.6)
MCHC RBC-ENTMCNC: 27.8 PG — SIGNIFICANT CHANGE UP (ref 27–34)
MCHC RBC-ENTMCNC: 33 GM/DL — SIGNIFICANT CHANGE UP (ref 32–36)
MCV RBC AUTO: 84.3 FL — SIGNIFICANT CHANGE UP (ref 80–100)
MONOCYTES # BLD AUTO: 0.61 K/UL — SIGNIFICANT CHANGE UP (ref 0–0.9)
MONOCYTES NFR BLD AUTO: 7.6 % — SIGNIFICANT CHANGE UP (ref 2–14)
NEUTROPHILS # BLD AUTO: 4.53 K/UL — SIGNIFICANT CHANGE UP (ref 1.8–7.4)
NEUTROPHILS NFR BLD AUTO: 56.6 % — SIGNIFICANT CHANGE UP (ref 43–77)
NON HDL CHOLESTEROL: 76 MG/DL — SIGNIFICANT CHANGE UP
NRBC # BLD: 0 /100 WBCS — SIGNIFICANT CHANGE UP (ref 0–0)
PHOSPHATE SERPL-MCNC: 3.7 MG/DL — SIGNIFICANT CHANGE UP (ref 2.5–4.5)
PLATELET # BLD AUTO: 285 K/UL — SIGNIFICANT CHANGE UP (ref 150–400)
POTASSIUM SERPL-MCNC: 3.8 MMOL/L — SIGNIFICANT CHANGE UP (ref 3.5–5.3)
POTASSIUM SERPL-SCNC: 3.8 MMOL/L — SIGNIFICANT CHANGE UP (ref 3.5–5.3)
RBC # BLD: 4.21 M/UL — SIGNIFICANT CHANGE UP (ref 4.2–5.8)
RBC # FLD: 12.8 % — SIGNIFICANT CHANGE UP (ref 10.3–14.5)
SODIUM SERPL-SCNC: 142 MMOL/L — SIGNIFICANT CHANGE UP (ref 135–145)
TRIGL SERPL-MCNC: 66 MG/DL — SIGNIFICANT CHANGE UP
WBC # BLD: 8.01 K/UL — SIGNIFICANT CHANGE UP (ref 3.8–10.5)
WBC # FLD AUTO: 8.01 K/UL — SIGNIFICANT CHANGE UP (ref 3.8–10.5)

## 2024-05-09 PROCEDURE — 95720 EEG PHY/QHP EA INCR W/VEEG: CPT

## 2024-05-09 PROCEDURE — 99223 1ST HOSP IP/OBS HIGH 75: CPT

## 2024-05-09 RX ORDER — ENOXAPARIN SODIUM 100 MG/ML
40 INJECTION SUBCUTANEOUS EVERY 24 HOURS
Refills: 0 | Status: DISCONTINUED | OUTPATIENT
Start: 2024-05-09 | End: 2024-05-10

## 2024-05-09 RX ADMIN — Medication 325 MILLIGRAM(S): at 21:19

## 2024-05-09 RX ADMIN — Medication 81 MILLIGRAM(S): at 11:14

## 2024-05-09 RX ADMIN — FINASTERIDE 5 MILLIGRAM(S): 5 TABLET, FILM COATED ORAL at 21:19

## 2024-05-09 RX ADMIN — ATORVASTATIN CALCIUM 40 MILLIGRAM(S): 80 TABLET, FILM COATED ORAL at 21:19

## 2024-05-09 RX ADMIN — TAMSULOSIN HYDROCHLORIDE 0.8 MILLIGRAM(S): 0.4 CAPSULE ORAL at 21:19

## 2024-05-09 RX ADMIN — ENOXAPARIN SODIUM 40 MILLIGRAM(S): 100 INJECTION SUBCUTANEOUS at 12:11

## 2024-05-09 RX ADMIN — Medication 1 TABLET(S): at 11:11

## 2024-05-09 RX ADMIN — PANTOPRAZOLE SODIUM 20 MILLIGRAM(S): 20 TABLET, DELAYED RELEASE ORAL at 21:18

## 2024-05-09 RX ADMIN — Medication 2000 UNIT(S): at 21:19

## 2024-05-09 NOTE — DISCHARGE NOTE PROVIDER - HOSPITAL COURSE
History of Present Illness:   67 yo man past medical history of stroke/TIA with no residual symptoms, HLD, GERD, BPH, melanoma (2002, required neck surgery + LN dissection, treated at [facility] [place]) and new onset seizure presenting to [facility] EMU as an elective admission. Patient was seen by Dr. Woody in the outpatient setting on 5/1/24. Per chart review, patient had presented to [facility] 4/24-4/25  for seizure-like activity. Patient reports he was sleeping in bed when around 9am his wife heard him scream. She wasn't in the bedroom at the time so she rushed in and found him in bed.  She denies shaking but she saw frothing of the mouth. She called EMS and it was about a little under 10 minutes before EMS arrived. She reports EMS telling her he became combative and they had to use restraints to get him in the gurney. Patient has no memory of the event and his first memory is the restraints which he thought was a dream. He had bilateral tongue injuries. At the hospital he was told he had "an old stroke on imaging" so he was started on atorvastatin, ASA, and keppra 500 mg BID. No symptoms leading up to the seizure. However, over the past 5 months, he has been having brief episodes every 1-2 weeks during which he suddenly feels very warm and nauseous x 5 seconds, then it passes. This past Saturday, he had a similar episode during which he felt warm and nauseous, and then had vertigo for about a minute. No associated loss of consciousness or disorientation." On 5/8, he had an MRI of the brain preformed prior to his arrival for admission.    Hospital Course:  Patient presented to hospital on 5/8 for elective admission to EMU for EEG monitoring. Patient verbalized taking Keppra 750mg in the morning and was titrated down to 375mg at bedtime. Patient placed on EEG monitoring, which showed right frontal discharges. Patient denies feeling any episodes he was having prior to being on Keppra, such as quick intermittent waves of nausea, flushing and dizziness lasting 5-10 seconds. Keppra has since been discontinued on 5/9, until completion of the EEG study.     Imaging/EEG:    EEG 5/9  IMPRESSION/CLINICAL CORRELATE:  This is an abnormal EEG record.   -Risk for focal onset seizures arising from the right frontotemporal region.        History of Present Illness:   67 yo man past medical history of stroke/TIA with no residual symptoms, HLD, GERD, BPH, melanoma (2002, required neck surgery + LN dissection, treated at [facility]) and new onset seizure presenting to [facility]U as an elective admission. Patient was seen by Dr. Woody in the outpatient setting on 5/1/24. Per chart review, patient had presented to [facility] 4/24-4/25  for seizure-like activity. Patient reports he was sleeping in bed when around 9am his wife heard him scream. She wasn't in the bedroom at the time so she rushed in and found him in bed.  She denies shaking but she saw frothing of the mouth. She called EMS and it was about a little under 10 minutes before EMS arrived. She reports EMS telling her he became combative and they had to use restraints to get him in the gurney. Patient has no memory of the event and his first memory is the restraints which he thought was a dream. He had bilateral tongue injuries. At the hospital he was told he had "an old stroke on imaging" so he was started on atorvastatin, ASA, and keppra 500 mg BID. No symptoms leading up to the seizure. However, over the past 5 months, he has been having brief episodes every 1-2 weeks during which he suddenly feels very warm and nauseous x 5 seconds, then it passes. This past Saturday, he had a similar episode during which he felt warm and nauseous, and then had vertigo for about a minute. No associated loss of consciousness or disorientation." On 5/8, he had an MRI of the brain preformed prior to his arrival for admission.    Hospital Course:  Patient presented to hospital on 5/8 for elective admission to EMU for EEG monitoring. Patient verbalized taking Keppra 750mg in the morning and was titrated down to 375mg at bedtime. Patient placed on EEG monitoring, which showed right frontal discharges. Patient denies feeling any episodes he was having prior to being on Keppra, such as quick intermittent waves of nausea, flushing and dizziness lasting 5-10 seconds.   Keppra has since been discontinued on 5/9, until completion of the EEG study.   Patient 5/10 was loaded with 1500 mg keppra, then continued on 750 mg BID to be continued in the outpatient setting.  To be also given a nayzilam spray to be used as needed as a rescue medication.    Patient will need to follow up with Dr. Woody outpatient.    Imaging/EEG:    EEG 5/9  IMPRESSION/CLINICAL CORRELATE:  This is an abnormal EEG record.   -Risk for focal onset seizures arising from the right frontotemporal region.        History of Present Illness:   69 yo man past medical history of stroke/TIA with no residual symptoms, HLD, GERD, BPH, melanoma (2002, required neck surgery + LN dissection[place] [facility] and new onset seizure presenting to [facility] EMU as an elective admission. Patient was seen by Dr. Woody in the outpatient setting on 5/1/24. Per chart review, patient had presented to [facility] 4/24-4/25  for seizure-like activity. Patient reports he was sleeping in bed when around 9am his wife heard him scream. She wasn't in the bedroom at the time so she rushed in and found him in bed.  She denies shaking but she saw frothing of the mouth. She called EMS and it was about a little under 10 minutes before EMS arrived. She reports EMS telling her he became combative and they had to use restraints to get him in the gurney. Patient has no memory of the event and his first memory is the restraints which he thought was a dream. He had bilateral tongue injuries. At the hospital he was told he had "an old stroke on imaging" so he was started on atorvastatin, ASA, and keppra 500 mg BID. No symptoms leading up to the seizure. However, over the past 5 months, he has been having brief episodes every 1-2 weeks during which he suddenly feels very warm and nauseous x 5 seconds, then it passes. This past Saturday, he had a similar episode during which he felt warm and nauseous, and then had vertigo for about a minute. No associated loss of consciousness or disorientation." On 5/8, he had an MRI of the brain preformed prior to his arrival for admission.    Hospital Course:  Patient presented to hospital on 5/8 for elective admission to EMU for EEG monitoring. Patient verbalized taking Keppra 750mg in the morning and was titrated down to 375mg at bedtime. Patient placed on EEG monitoring, which showed right frontal discharges. Patient denies feeling any episodes he was having prior to being on Keppra, such as quick intermittent waves of nausea, flushing and dizziness lasting 5-10 seconds.   Keppra has since been discontinued on 5/9, until completion of the EEG study.   Patient 5/10 was loaded with 1500 mg keppra, then continued on 750 mg BID to be continued in the outpatient setting.  To be also given a nayzilam spray to be used as needed as a rescue medication.    Patient will need to follow up with Dr. Woody outpatient.  Patient will also need to follow up with the gastroenterology team outpatient.    Imaging/EEG:  EEG 5/9  IMPRESSION/CLINICAL CORRELATE:  This is an abnormal EEG record.   -Risk for focal onset seizures arising from the right frontotemporal region.     EEG 5/10  IMPRESSION/CLINICAL CORRELATE:  This is an abnormal EEG record.   -	Risk for focal onset seizures arising from the right frontotemporal region.

## 2024-05-09 NOTE — DISCHARGE NOTE PROVIDER - NSDCMRMEDTOKEN_GEN_ALL_CORE_FT
alfuzosin 10 mg oral tablet, extended release: 1 tab(s) orally once a day  aspirin 81 mg oral tablet, chewable: 1 tab(s) orally once a day  atorvastatin 40 mg oral tablet: 1 tab(s) orally once a day (at bedtime)  D3 50 mcg (2000 intl units) oral capsule: 1 cap(s) orally once a day (at bedtime)  finasteride 5 mg oral tablet: 1 tab(s) orally once a day (at bedtime)  iron polysaccharide (as elemental iron) 60 mg oral capsule: orally once a day (at bedtime) anemia  Multiple Vitamins oral tablet: 1 tab(s) orally once a day  omeprazole 20 mg oral delayed release capsule: 1 cap(s) orally once a day   alfuzosin 10 mg oral tablet, extended release: 1 tab(s) orally once a day  aspirin 81 mg oral tablet, chewable: 1 tab(s) orally once a day  atorvastatin 40 mg oral tablet: 1 tab(s) orally once a day (at bedtime)  D3 50 mcg (2000 intl units) oral capsule: 1 cap(s) orally once a day (at bedtime)  finasteride 5 mg oral tablet: 1 tab(s) orally once a day (at bedtime)  iron polysaccharide (as elemental iron) 60 mg oral capsule: orally once a day (at bedtime) anemia  Keppra 750 mg oral tablet: 1 tab(s) orally 2 times a day  Multiple Vitamins oral tablet: 1 tab(s) orally once a day  Nayzilam 5 mg/inh nasal spray: 1 spray(s) intranasally 2 times a day as needed for seizure &gt;3minutes 5 mg (1 spray) as a single dose in 1 nostril; may repeat same dose in 10 minutes in alternate nostril based on response and tolerability (do not repeat if the patient is having trouble breathing or excessive sedation) MDD: 10 mg  omeprazole 20 mg oral delayed release capsule: 1 cap(s) orally once a day

## 2024-05-09 NOTE — DISCHARGE NOTE PROVIDER - CARE PROVIDERS DIRECT ADDRESSES
[email or web address]

## 2024-05-09 NOTE — DISCHARGE NOTE PROVIDER - CARE PROVIDER_API CALL
Darrin Woody  Neurology  [address]  [place] [address]  Phone: [phone]  Fax: [phone]  Follow Up Time: Routine   Darrin Woody  Neurology  [address]  [place] [address]  Phone: [phone]  Fax: [phone]  Follow Up Time: Routine    Ernie Jordan  Gastroenterology  [address]  [place] [address]  Phone: [phone]  Fax: [phone]  Follow Up Time: Routine    Quentin Moncada  Surgery  [address]  [place] [address]  Phone: [phone]  Fax: [phone]  Follow Up Time: Routine

## 2024-05-09 NOTE — DISCHARGE NOTE PROVIDER - NSDCCPCAREPLAN_GEN_ALL_CORE_FT
PRINCIPAL DISCHARGE DIAGNOSIS  Diagnosis: Seizures  Assessment and Plan of Treatment:      PRINCIPAL DISCHARGE DIAGNOSIS  Diagnosis: Seizures  Assessment and Plan of Treatment: You presented to [facility] for seizure evaluation.  On EEG, you were noted to have dscharges coming from the R front of your brain concerning for increased risk of seizures.  Make sure to sleep well and avoid stressors.  On discharge, please take the following seizure medications:  keppra 750 mg twice a day  nayzilam spray as needed for breakthrough seizures >3 minutes  Please follow up with Dr. Woody in the outpatient setting on discharge (please call his clinic to schedule an appointment).  -please avoid driving for at least 1 year (until otherwise further discussed with your neurologist), avoiding working at tall heights, avoiding swimming without supervision, being near open fires without closer supervision  Please follow up with the gastroenterology team in the outpatient setting.       PRINCIPAL DISCHARGE DIAGNOSIS  Diagnosis: Seizures  Assessment and Plan of Treatment: You presented to [facility] for seizure evaluation.  On EEG, you were noted to have dscharges coming from the R front of your brain concerning for increased risk of seizures.  Make sure to sleep well and avoid stressors.  On discharge, please take the following seizure medications:  keppra 750 mg twice a day  nayzilam spray as needed for breakthrough seizures >3 minutes  Please follow up with Dr. Woody in the outpatient setting on discharge (please call his clinic to schedule an appointment).  -please avoid driving for at least 1 year (until otherwise further discussed with your neurologist), operating heavy machinery, swimming unsupervised, working or playing at unprotected heights, standing near edge of subway platform, or biking in traffic.  Please follow up with the gastroenterology team in the outpatient setting for further workup       PRINCIPAL DISCHARGE DIAGNOSIS  Diagnosis: Seizures  Assessment and Plan of Treatment: You presented to [facility] for seizure evaluation.  On EEG, you were noted to have dscharges coming from the R front of your brain concerning for increased risk of seizures.  Make sure to sleep well and avoid stressors.  On discharge, please take the following seizure medications:  -keppra 750 mg twice a day  -nayzilam spray as needed for breakthrough seizures >3 minutes; please take as prescribed (5 mg (1 spray) as a single dose in 1 nostril; may repeat same dose in 10 minutes in alternate nostril based on response and tolerability (do not repeat if the patient is having trouble breathing or excessive sedation). mMaximum dose: 10 mg (2 sprays) per single episode.  Please follow up with Dr. Woody in the outpatient setting on discharge (please call his clinic to schedule an appointment).  -please avoid driving for at least 1 year (until otherwise further discussed with your neurologist), operating heavy machinery, swimming unsupervised, working or playing at unprotected heights, standing near edge of subway platform, or biking in traffic.  Please follow up with the gastroenterology team in the outpatient setting for further workup

## 2024-05-09 NOTE — DISCHARGE NOTE PROVIDER - NSDCFUSCHEDAPPT_GEN_ALL_CORE_FT
Ramírez Murray  [facility]  ORTHOSUR [address]  Scheduled Appointment: 05/22/2024

## 2024-05-09 NOTE — DISCHARGE NOTE PROVIDER - PROVIDER TOKENS
PROVIDER:[TOKEN:[[id number]],FOLLOWUP:[Routine]] PROVIDER:[TOKEN:[[id number]],FOLLOWUP:[Routine]],PROVIDER:[TOKEN:[[id number]],FOLLOWUP:[Routine]],PROVIDER:[TOKEN:[[id number]],FOLLOWUP:[Routine]]

## 2024-05-09 NOTE — EEG REPORT - NS EEG TEXT BOX
69 yo man past medical history of stroke/TIA with no residual symptoms, seizures presenting to [facility] EMU as an elective admission for diagnostic clarification and event capture. 4/2024 had an episode of GTCS. Over the past 5 months, he has been having brief episodes every 1-2 weeks during which he suddenly feels very warm and nauseous x 5 seconds, then it passes.     Home Antiepileptic Medication and/or Device:  Keppra 750mg BID      -------------------------------------------------------------------------------------------------------------------------------------------------------  INTERPRETATION:    DAY 1 	START: 5/8/2024  4:57:32 PM     	END: 5/9/2024  08:00  	DURATION: 14 HR  37  MIN    DAILY EEG VISUAL ANALYSIS    The background was continuous, symmetric, spontaneously variable and reactive. During wakefulness, the posterior dominant rhythm consisted of symmetric, well-modulated 11 Hz activity, with amplitude to 30 uV, that attenuated to eye opening.  Low amplitude frontal beta was noted in wakefulness.   The anterior to posterior gradient was present.     BACKGROUND SLOWING:  Diffuse theta and delta frequencies were present.    FOCAL SLOWING:   None was present.    SLEEP BACKGROUND:  Drowsiness was characterized by fragmentation, attenuation, and slowing of the background activity.    Sleep was characterized by the presence of vertex waves, symmetric sleep spindles and K-complexes.    OTHER NON-EPILEPTIFORM FINDINGS:  None were present.    ACTIVATION PROCEDURES:   Hyperventilation was not performed.    Photic stimulation was not performed.    INTERICTAL EPILEPTIFORM ACTIVITY:   Rare right frontotemporal discharges with variable maximum, at times frontal (Fp2/F4/F8) maximum and at times inferior temporal (F8/F10/T10)    EVENTS:  No events or seizures recorded.    ARTIFACTS:  Intermittent myogenic and movement artifacts were noted.    ECG:  The heart rate on single channel ECG was predominantly between 60 to 80 BPM.    ASMs: Keppra 375mg qhs    -------------------------------------------------------------------------------------------------------------------------------------------------------  EEG SUMMARY:  Abnormal EEG in the awake, drowsy and asleep states.  -	Rare right frontotemporal discharges with variable maximum, at times frontal and at times inferior temporal    -------------------------------------------------------------------------------------------------------------------------------------------------------  IMPRESSION/CLINICAL CORRELATE:  This is an abnormal EEG record.   -	Risk for focal onset seizures arising from the right frontotemporal region.     This is fellow preliminary read, pending attending review.    CLARA Pollard  Epilepsy Fellow     69 yo man past medical history of stroke/TIA with no residual symptoms, seizures presenting to [facility] EMU as an elective admission for diagnostic clarification and event capture. 4/2024 had an episode of GTCS. Over the past 5 months, he has been having brief episodes every 1-2 weeks during which he suddenly feels very warm and nauseous x 5 seconds, then it passes.     Home Antiepileptic Medication and/or Device:  Keppra 750mg BID      -------------------------------------------------------------------------------------------------------------------------------------------------------  INTERPRETATION:    DAY 1 	START: 5/8/2024  4:57:32 PM     	END: 5/9/2024  08:00  	DURATION: 14 HR  37  MIN    DAILY EEG VISUAL ANALYSIS    The background was continuous, symmetric, spontaneously variable and reactive. During wakefulness, the posterior dominant rhythm consisted of symmetric, well-modulated 11 Hz activity, with amplitude to 30 uV, that attenuated to eye opening.  Low amplitude frontal beta was noted in wakefulness.   The anterior to posterior gradient was present.     BACKGROUND SLOWING:  Diffuse theta and delta frequencies were present.    FOCAL SLOWING:   None was present.    SLEEP BACKGROUND:  Drowsiness was characterized by fragmentation, attenuation, and slowing of the background activity.    Sleep was characterized by the presence of vertex waves, symmetric sleep spindles and K-complexes.    OTHER NON-EPILEPTIFORM FINDINGS:  None were present.    ACTIVATION PROCEDURES:   Hyperventilation was not performed.    Photic stimulation was not performed.    INTERICTAL EPILEPTIFORM ACTIVITY:   Rare right frontotemporal discharges with variable maximum, at times frontal (Fp2/F4/F8) maximum and at times inferior temporal (F8/F10/T10)    EVENTS:  No events or seizures recorded.    ARTIFACTS:  Intermittent myogenic and movement artifacts were noted.    ECG:  The heart rate on single channel ECG was predominantly between 60 to 80 BPM.    ASMs: Keppra 375mg qhs    -------------------------------------------------------------------------------------------------------------------------------------------------------  EEG SUMMARY:  Abnormal EEG in the awake, drowsy and asleep states.  -	Rare right frontotemporal discharges with variable maximum, at times frontal and at times inferior temporal    -------------------------------------------------------------------------------------------------------------------------------------------------------  IMPRESSION/CLINICAL CORRELATE:  This is an abnormal EEG record.   -	Risk for focal onset seizures arising from the right frontotemporal region.       CLARA Pollard  Epilepsy Fellow

## 2024-05-10 ENCOUNTER — TRANSCRIPTION ENCOUNTER (OUTPATIENT)
Age: 69
End: 2024-05-10

## 2024-05-10 VITALS
OXYGEN SATURATION: 95 % | HEART RATE: 74 BPM | RESPIRATION RATE: 18 BRPM | SYSTOLIC BLOOD PRESSURE: 136 MMHG | TEMPERATURE: 99 F | DIASTOLIC BLOOD PRESSURE: 81 MMHG

## 2024-05-10 LAB
ALBUMIN SERPL ELPH-MCNC: 4.2 G/DL — SIGNIFICANT CHANGE UP (ref 3.3–5)
ALP SERPL-CCNC: 66 U/L — SIGNIFICANT CHANGE UP (ref 40–120)
ALT FLD-CCNC: 25 U/L — SIGNIFICANT CHANGE UP (ref 10–45)
ANION GAP SERPL CALC-SCNC: 11 MMOL/L — SIGNIFICANT CHANGE UP (ref 5–17)
AST SERPL-CCNC: 18 U/L — SIGNIFICANT CHANGE UP (ref 10–40)
BILIRUB SERPL-MCNC: 0.7 MG/DL — SIGNIFICANT CHANGE UP (ref 0.2–1.2)
BUN SERPL-MCNC: 22 MG/DL — SIGNIFICANT CHANGE UP (ref 7–23)
CALCIUM SERPL-MCNC: 9.6 MG/DL — SIGNIFICANT CHANGE UP (ref 8.4–10.5)
CHLORIDE SERPL-SCNC: 105 MMOL/L — SIGNIFICANT CHANGE UP (ref 96–108)
CO2 SERPL-SCNC: 26 MMOL/L — SIGNIFICANT CHANGE UP (ref 22–31)
CREAT SERPL-MCNC: 0.87 MG/DL — SIGNIFICANT CHANGE UP (ref 0.5–1.3)
EGFR: 95 ML/MIN/1.73M2 — SIGNIFICANT CHANGE UP
GLUCOSE SERPL-MCNC: 95 MG/DL — SIGNIFICANT CHANGE UP (ref 70–99)
HCT VFR BLD CALC: 35.6 % — LOW (ref 39–50)
HGB BLD-MCNC: 11.9 G/DL — LOW (ref 13–17)
MCHC RBC-ENTMCNC: 28.1 PG — SIGNIFICANT CHANGE UP (ref 27–34)
MCHC RBC-ENTMCNC: 33.4 GM/DL — SIGNIFICANT CHANGE UP (ref 32–36)
MCV RBC AUTO: 84.2 FL — SIGNIFICANT CHANGE UP (ref 80–100)
NRBC # BLD: 0 /100 WBCS — SIGNIFICANT CHANGE UP (ref 0–0)
PLATELET # BLD AUTO: 284 K/UL — SIGNIFICANT CHANGE UP (ref 150–400)
POTASSIUM SERPL-MCNC: 3.7 MMOL/L — SIGNIFICANT CHANGE UP (ref 3.5–5.3)
POTASSIUM SERPL-SCNC: 3.7 MMOL/L — SIGNIFICANT CHANGE UP (ref 3.5–5.3)
PROT SERPL-MCNC: 6.7 G/DL — SIGNIFICANT CHANGE UP (ref 6–8.3)
RBC # BLD: 4.23 M/UL — SIGNIFICANT CHANGE UP (ref 4.2–5.8)
RBC # FLD: 12.8 % — SIGNIFICANT CHANGE UP (ref 10.3–14.5)
SODIUM SERPL-SCNC: 142 MMOL/L — SIGNIFICANT CHANGE UP (ref 135–145)
WBC # BLD: 7.99 K/UL — SIGNIFICANT CHANGE UP (ref 3.8–10.5)
WBC # FLD AUTO: 7.99 K/UL — SIGNIFICANT CHANGE UP (ref 3.8–10.5)

## 2024-05-10 PROCEDURE — 83735 ASSAY OF MAGNESIUM: CPT

## 2024-05-10 PROCEDURE — 80053 COMPREHEN METABOLIC PANEL: CPT

## 2024-05-10 PROCEDURE — 80048 BASIC METABOLIC PNL TOTAL CA: CPT

## 2024-05-10 PROCEDURE — 84100 ASSAY OF PHOSPHORUS: CPT

## 2024-05-10 PROCEDURE — 81001 URINALYSIS AUTO W/SCOPE: CPT

## 2024-05-10 PROCEDURE — 95716 VEEG EA 12-26HR CONT MNTR: CPT

## 2024-05-10 PROCEDURE — 99239 HOSP IP/OBS DSCHRG MGMT >30: CPT

## 2024-05-10 PROCEDURE — 85025 COMPLETE CBC W/AUTO DIFF WBC: CPT

## 2024-05-10 PROCEDURE — 80307 DRUG TEST PRSMV CHEM ANLYZR: CPT

## 2024-05-10 PROCEDURE — 95700 EEG CONT REC W/VID EEG TECH: CPT

## 2024-05-10 PROCEDURE — 95713 VEEG 2-12 HR CONT MNTR: CPT

## 2024-05-10 PROCEDURE — 80177 DRUG SCRN QUAN LEVETIRACETAM: CPT

## 2024-05-10 PROCEDURE — 80061 LIPID PANEL: CPT

## 2024-05-10 PROCEDURE — 95718 EEG PHYS/QHP 2-12 HR W/VEEG: CPT

## 2024-05-10 PROCEDURE — 85027 COMPLETE CBC AUTOMATED: CPT

## 2024-05-10 RX ORDER — LEVETIRACETAM 250 MG/1
1500 TABLET, FILM COATED ORAL ONCE
Refills: 0 | Status: COMPLETED | OUTPATIENT
Start: 2024-05-10 | End: 2024-05-10

## 2024-05-10 RX ORDER — MIDAZOLAM HYDROCHLORIDE 1 MG/ML
1 INJECTION, SOLUTION INTRAMUSCULAR; INTRAVENOUS
Qty: 1 | Refills: 1
Start: 2024-05-10

## 2024-05-10 RX ORDER — LEVETIRACETAM 250 MG/1
1500 TABLET, FILM COATED ORAL ONCE
Refills: 0 | Status: DISCONTINUED | OUTPATIENT
Start: 2024-05-10 | End: 2024-05-10

## 2024-05-10 RX ORDER — LACOSAMIDE 50 MG/1
100 TABLET ORAL ONCE
Refills: 0 | Status: DISCONTINUED | OUTPATIENT
Start: 2024-05-10 | End: 2024-05-10

## 2024-05-10 RX ORDER — LEVETIRACETAM 250 MG/1
750 TABLET, FILM COATED ORAL
Refills: 0 | Status: DISCONTINUED | OUTPATIENT
Start: 2024-05-10 | End: 2024-05-10

## 2024-05-10 RX ORDER — LEVETIRACETAM 250 MG/1
1 TABLET, FILM COATED ORAL
Qty: 60 | Refills: 1
Start: 2024-05-10

## 2024-05-10 RX ADMIN — Medication 81 MILLIGRAM(S): at 12:06

## 2024-05-10 RX ADMIN — ENOXAPARIN SODIUM 40 MILLIGRAM(S): 100 INJECTION SUBCUTANEOUS at 12:06

## 2024-05-10 RX ADMIN — LEVETIRACETAM 600 MILLIGRAM(S): 250 TABLET, FILM COATED ORAL at 12:05

## 2024-05-10 RX ADMIN — Medication 1 TABLET(S): at 12:06

## 2024-05-10 NOTE — DISCHARGE NOTE NURSING/CASE MANAGEMENT/SOCIAL WORK - PATIENT PORTAL LINK FT
You can access the FollowMyHealth Patient Portal offered by [facility] by registering at the following website: [email or web address]. By joining [employer]’s FollowMyHealth portal, you will also be able to view your health information using other applications (apps) compatible with our system.

## 2024-05-10 NOTE — DISCHARGE NOTE NURSING/CASE MANAGEMENT/SOCIAL WORK - NSDCPEFALRISK_GEN_ALL_CORE
For information on Fall & Injury Prevention, visit: [email or web address] OR  [email or web address] OR  https://www.cdc.gov/steadi/patient.html

## 2024-05-10 NOTE — PROGRESS NOTE ADULT - ATTENDING COMMENTS
Patient personally seen and examined - EEG with right FT discharges.  Consistent with diagnosis of right anterior focal epilepsy.  Will load back on medication and continue LEV 750mg bid.  No driving per DMV guidelines. Seizure safety and risk factors discussed in detail.

## 2024-05-10 NOTE — PROGRESS NOTE ADULT - SUBJECTIVE AND OBJECTIVE BOX
Patient is a 68y Male     Patient is a 68y old  Male who presents with a chief complaint of concern for seizure activity (09 May 2024 12:42)      HPI:  67 yo man past medical history of stroke/TIA with no residual symptoms, HLD, GERD, BPH, melanoma (2002, required neck surgery + LN [place] [facility] [place] and new onset seizure presenting to [facility] EMU as an elective admission. Patient was seen by Dr. Woody in the outpatient setting on 5/1/24. Per chart review, patient had presented to [facility] 4/24-4/25  for seizure-like activity. Patient reports he was sleeping in bed when around 9am his wife heard him scream. She wasn't in the bedroom at the time so she rushed in and found him in bed.  She denies shaking but she saw frothing of the mouth. She called EMS and it was about a little under 10 minutes before EMS arrived. She reports EMS telling her he became combative and they had to use restraints to get him in the gurney. Patient has no memory of the event and his first memory is the restraints which he thought was a dream. He had bilateral tongue injuries. At the hospital he was told he had "an old stroke on imaging" so he was started on atorvastatin, ASA, and keppra 500 mg BID. They also placed a holter monitor on him for which he still has on. Patient then presented again to the hospital on 4/27 for a fever, was noted to be febrile to 102.8. Patient was then admitted and worked up for UTI. CT scan showed cystitis.  MRI of the lumbar spine showed small fluid collections in the paraspinal soft tissues in the lumbar region, and ortho was consulted as patient has a history of lumbar surgery done in 2022. No symptoms leading up to the seizure. However, over the past 5 months, he has been having brief episodes every 1-2 weeks during which he suddenly feels very warm and nauseous x 5 seconds, then it passes. This past Saturday, he had a similar episode during which he felt warm and nauseous, and then had vertigo for about a minute. No associated loss of consciousness or disorientation." Today he had an MRI preformed prior to admission.    PMHx: melanoma 2002 [requiring neck surgery, lymph node dissection, treated at [facility]], GERD    Social history:  He is a retired . 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 He is very active and drives, but has not been driving since the seizure.     Allergies:  penicillin (Rash)  penicillin (Hives)    Home medications  Atorvastatin 40mg nightly  aspirin 81mg daily  finasteride 5mg nightly  alfuzosin 10mg nightly  nexium 30mg nightly  Fe 65 nightly  Vitamin D3 nightly    keppra 750mg bid                    (08 May 2024 16:08)      PAST MEDICAL & SURGICAL HISTORY:  Benign prostatic hyperplasia      GERD (gastroesophageal reflux disease)      Melanoma      MVP (mitral valve prolapse)      Moderate mitral regurgitation      GERD (gastroesophageal reflux disease)      History of BPH      History of seizure      S/P hernia repair      S/P lumbar laminectomy      Status post dilation of esophageal narrowing          MEDICATIONS  (STANDING):  aspirin  chewable 81 milliGRAM(s) Oral daily  atorvastatin 40 milliGRAM(s) Oral at bedtime  cholecalciferol 2000 Unit(s) Oral at bedtime  enoxaparin Injectable 40 milliGRAM(s) SubCutaneous every 24 hours  ferrous    sulfate 325 milliGRAM(s) Oral at bedtime  finasteride 5 milliGRAM(s) Oral at bedtime  influenza  Vaccine (HIGH DOSE) 0.7 milliLiter(s) IntraMuscular once  multivitamin 1 Tablet(s) Oral daily  pantoprazole    Tablet 20 milliGRAM(s) Oral at bedtime  tamsulosin 0.8 milliGRAM(s) Oral at bedtime      Allergies    penicillin (Rash)  penicillin (Hives)    Intolerances        SOCIAL HISTORY:  Denies ETOh,Smoking,     FAMILY HISTORY:      REVIEW OF SYSTEMS:    CONSTITUTIONAL: No weakness, fevers or chills  EYES/ENT: No visual changes;  No vertigo or throat pain   NECK: No pain or stiffness  RESPIRATORY: No cough, wheezing, hemoptysis; No shortness of breath  CARDIOVASCULAR: No chest pain or palpitations  GASTROINTESTINAL: No abdominal or epigastric pain. No nausea, vomiting, or hematemesis; No diarrhea or constipation. No melena or hematochezia.  GENITOURINARY: No dysuria, frequency or hematuria  NEUROLOGICAL: No numbness or weakness  SKIN: No itching, burning, rashes, or lesions   All other review of systems is negative unless indicated above.    VITAL:  T(C): , Max: 36.7 (05-10-24 @ 04:51)  T(F): , Max: 98.1 (05-10-24 @ 04:51)  HR: 69 (05-10-24 @ 04:51)  BP: 146/85 (05-10-24 @ 04:51)  BP(mean): --  RR: 18 (05-10-24 @ 04:51)  SpO2: 93% (05-10-24 @ 04:51)  Wt(kg): --    I and O's:    05-08 @ 07:01  -  05-09 @ 07:00  --------------------------------------------------------  IN: 207 mL / OUT: 0 mL / NET: 207 mL    05-09 @ 07:01  -  05-10 @ 06:58  --------------------------------------------------------  IN: 1260 mL / OUT: 0 mL / NET: 1260 mL          PHYSICAL EXAM:    Constitutional: NAD  HEENT: PERRLA,   Neck: No JVD  Respiratory: CTA B/L  Cardiovascular: S1 and S2  Gastrointestinal: BS+, soft, NT/ND  Extremities: No peripheral edema  Neurological: A/O x 3, no focal deficits  Psychiatric: Normal mood, normal affect  : No Nunez  Skin: No rashes  Access: Not applicable  Back: No CVA tenderness    LABS:                        11.7   8.01  )-----------( 285      ( 09 May 2024 06:54 )             35.5     05-09    142  |  104  |  17  ----------------------------<  82  3.8   |  24  |  0.84    Ca    9.6      09 May 2024 06:53  Phos  3.7     05-09  Mg     2.0     05-09    TPro  7.2  /  Alb  4.5  /  TBili  0.3  /  DBili  x   /  AST  24  /  ALT  34  /  AlkPhos  79  05-08          RADIOLOGY & ADDITIONAL STUDIES:                          
Neurology Progress Note    SUBJECTIVE/OBJECTIVE/INTERVAL EVENTS: Patient seen and examined at bedside w/ neuro attending and team.     INTERVAL HISTORY: No overnight events reported. No nausea or other complaints.     REVIEW OF SYSTEMS: Otherwise denies fever, chills, headaches, vision changes, blurry vision, double vision, nausea, vomiting, hearing change, focal weakness, focal numbness, parasthesias, bowel/ bladder incontinence.     VITALS & EXAMINATION:  Vital Signs Last 24 Hrs  T(C): 36.7 (10 May 2024 04:51), Max: 36.7 (10 May 2024 04:51)  T(F): 98.1 (10 May 2024 04:51), Max: 98.1 (10 May 2024 04:51)  HR: 69 (10 May 2024 04:51) (67 - 77)  BP: 146/85 (10 May 2024 04:51) (142/86 - 162/92)  BP(mean): --  RR: 18 (10 May 2024 04:51) (18 - 18)  SpO2: 93% (10 May 2024 04:51) (92% - 97%)    Parameters below as of 10 May 2024 04:51  Patient On (Oxygen Delivery Method): room air    General:  Physical Exam: Neurologic Exam:  Mental status - Awake, Alert, Oriented to person, place, and time. Speech fluent, repetition and naming intact. Follows simple commands.     Cranial nerves - PRRL, VFF, EOMI, face sensation (V1-V3) intact b/l, facial strength intact without asymmetry b/l, hearing intact b/l, palate with symmetric elevation, trapezius 5/5 strength b/l, tongue midline on protrusion with full lateral movement    Motor - Normal bulk and tone throughout. No pronator drift.  Strength testing            Deltoid      Biceps      Triceps           R            5                 5               5                     5                               L             5                 5               5                     5                                      Hip Flexion       Knee Flexion    Knee Extension    Dorsiflexion    Plantar Flexion  R              5                                        5                           5                            5                          5  L              5                                   5                           5                            5                          5    Sensation - Light touch intact throughout    DTR's -             Biceps      Triceps     Brachioradialis      Patellar    Ankle    Toes/plantar response  R             2+             2+                  2+                       2+            2+                 Down  L              2+             2+                 2+                        2+           2+                 Down    Coordination - Finger to Nose intact b/l. No tremors appreciated    Gait and station - Normal casual gait.      LABORATORY:  CBC                       11.9   7.99  )-----------( 284      ( 10 May 2024 07:19 )             35.6     Chem 05-10    142  |  105  |  22  ----------------------------<  95  3.7   |  26  |  0.87    Ca    9.6      10 May 2024 07:19  Phos  3.7     05-09  Mg     2.0     05-09    TPro  6.7  /  Alb  4.2  /  TBili  0.7  /  DBili  x   /  AST  18  /  ALT  25  /  AlkPhos  66  05-10    LFTs LIVER FUNCTIONS - ( 10 May 2024 07:19 )  Alb: 4.2 g/dL / Pro: 6.7 g/dL / ALK PHOS: 66 U/L / ALT: 25 U/L / AST: 18 U/L / GGT: x           Coagulopathy   Lipid Panel 05-09 Chol 119 LDL -- HDL 43 Trig 66, 04-25 Chol 182 LDL -- HDL 44 Trig 96  A1c   Cardiac enzymes     U/A Urinalysis Basic - ( 10 May 2024 07:19 )    Color: x / Appearance: x / SG: x / pH: x  Gluc: 95 mg/dL / Ketone: x  / Bili: x / Urobili: x   Blood: x / Protein: x / Nitrite: x   Leuk Esterase: x / RBC: x / WBC x   Sq Epi: x / Non Sq Epi: x / Bacteria: x      CSF  Immunological  Other    STUDIES & IMAGING: (EEG, CT, MR, U/S, TTE/FRANCIS):    MRI brain ww/o 5/8/24  No acute infarction, intracranial hemorrhage or mass lesion. No abnormal intracranial enhancement is identified.  Small chronic lacunar infarction in the right cerebellum is again seen. No associated enhancement/vasculature.  The hippocampi appear unremarkable. There is no evidence for congenital malformation/developmental anomaly.  No hydrocephalus. No extra-axial fluid collections. The skull base flow voids are present.  The visualized intraorbital contents are unremarkable. Trace mucosal thickening in the left maxillary sinus. The mastoid air cells are clear. The visualized osseous structures, soft tissues and partially visualized parotid glands appear normal.    IMPRESSION:  No acute infarct, hemorrhage, or mass lesion. No abnormal intracranial enhancement.  Small chronic lacunar infarct in the right cerebellum again seen.    TTE 4/28/24   1. Left ventricular systolic function is normal with an ejection fraction visually estimated at 50 to 55 %.   2. There is normal LV mass and normal geometry.   3. There is mild (grade 1) left ventricular diastolic dysfunction.   4. Normal right ventricular cavity size, with normal wall thickness, and normal systolic function.   5. The left atrium is moderately dilated.   6. The right atrium is mildly dilated.   7. Prolapse of the anterior mitral leaflet and prolapse of the posterior mitral leaflet.   8. Mild mitral regurgitation.   9. Trileaflet aortic valve with normal systolic excursion.      EEG 5/8/24 to 5/9/24  Abnormal EEG in the awake, drowsy and asleep states.  - Rare right frontotemporal discharges with variable maximum, at times frontal and at times inferior temporal  This is an abnormal EEG record.   - Risk for focal onset seizures arising from the right frontotemporal region.     
Patient is a 68y Male     Patient is a 68y old  Male who presents with a chief complaint of concern for seizure activity (08 May 2024 17:04)      HPI:  67 yo man past medical history of stroke/TIA with no residual symptoms, HLD, GERD, BPH, melanoma (2002, required neck surgery + LN dissection, treated at [facility] [place]) and new onset seizure presenting to [facility] EMU as an elective admission. Patient was seen by Dr. Woody in the outpatient setting on 5/1/24. Per chart review, patient had presented to [facility] 4/24-4/25  for seizure-like activity. Patient reports he was sleeping in bed when around 9am his wife heard him scream. She wasn't in the bedroom at the time so she rushed in and found him in bed.  She denies shaking but she saw frothing of the mouth. She called EMS and it was about a little under 10 minutes before EMS arrived. She reports EMS telling her he became combative and they had to use restraints to get him in the gurney. Patient has no memory of the event and his first memory is the restraints which he thought was a dream. He had bilateral tongue injuries. At the hospital he was told he had "an old stroke on imaging" so he was started on atorvastatin, ASA, and keppra 500 mg BID. They also placed a holter monitor on him for which he still has on. Patient then presented again to the hospital on 4/27 for a fever, was noted to be febrile to 102.8. Patient was then admitted and worked up for UTI. CT scan showed cystitis.  MRI of the lumbar spine showed small fluid collections in the paraspinal soft tissues in the lumbar region, and ortho was consulted as patient has a history of lumbar surgery done in 2022. No symptoms leading up to the seizure. However, over the past 5 months, he has been having brief episodes every 1-2 weeks during which he suddenly feels very warm and nauseous x 5 seconds, then it passes. This past Saturday, he had a similar episode during which he felt warm and nauseous, and then had vertigo for about a minute. No associated loss of consciousness or disorientation." Today he had an MRI preformed prior to admission.    PMHx: melanoma 2002 [requiring neck surgery, lymph node dissection, treated at [facility]], GERD    Social history:  He is a retired . 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 He is very active and drives, but has not been driving since the seizure.     Allergies:  penicillin (Rash)  penicillin (Hives)    Home medications  Atorvastatin 40mg nightly  aspirin 81mg daily  finasteride 5mg nightly  alfuzosin 10mg nightly  nexium 30mg nightly  Fe 65 nightly  Vitamin D3 nightly    keppra 750mg bid                    (08 May 2024 16:08)      PAST MEDICAL & SURGICAL HISTORY:  Benign prostatic hyperplasia      GERD (gastroesophageal reflux disease)      Melanoma      MVP (mitral valve prolapse)      Moderate mitral regurgitation      GERD (gastroesophageal reflux disease)      History of BPH      History of seizure      S/P hernia repair      S/P lumbar laminectomy      Status post dilation of esophageal narrowing          MEDICATIONS  (STANDING):  aspirin  chewable 81 milliGRAM(s) Oral daily  atorvastatin 40 milliGRAM(s) Oral at bedtime  cholecalciferol 2000 Unit(s) Oral at bedtime  ferrous    sulfate 325 milliGRAM(s) Oral at bedtime  finasteride 5 milliGRAM(s) Oral at bedtime  influenza  Vaccine (HIGH DOSE) 0.7 milliLiter(s) IntraMuscular once  multivitamin 1 Tablet(s) Oral daily  pantoprazole    Tablet 20 milliGRAM(s) Oral at bedtime  tamsulosin 0.8 milliGRAM(s) Oral at bedtime      Allergies    penicillin (Rash)  penicillin (Hives)    Intolerances        SOCIAL HISTORY:  Denies ETOh,Smoking,     FAMILY HISTORY:      REVIEW OF SYSTEMS:    CONSTITUTIONAL: No weakness, fevers or chills  EYES/ENT: No visual changes;  No vertigo or throat pain   NECK: No pain or stiffness  RESPIRATORY: No cough, wheezing, hemoptysis; No shortness of breath  CARDIOVASCULAR: No chest pain or palpitations  GASTROINTESTINAL: No abdominal or epigastric pain. No nausea, vomiting, or hematemesis; No diarrhea or constipation. No melena or hematochezia.  GENITOURINARY: No dysuria, frequency or hematuria  NEUROLOGICAL: No numbness or weakness  SKIN: No itching, burning, rashes, or lesions   All other review of systems is negative unless indicated above.    VITAL:  T(C): , Max: 36.9 (05-08-24 @ 23:45)  T(F): , Max: 98.4 (05-08-24 @ 23:45)  HR: 61 (05-09-24 @ 04:30)  BP: 149/52 (05-09-24 @ 04:30)  BP(mean): --  RR: 18 (05-09-24 @ 04:30)  SpO2: 95% (05-09-24 @ 04:30)  Wt(kg): --    I and O's:    05-08 @ 07:01  -  05-09 @ 06:57  --------------------------------------------------------  IN: 207 mL / OUT: 0 mL / NET: 207 mL          PHYSICAL EXAM:    Constitutional: NAD  HEENT: PERRLA,   Neck: No JVD  Respiratory: CTA B/L  Cardiovascular: S1 and S2  Gastrointestinal: BS+, soft, NT/ND  Extremities: No peripheral edema  Neurological: A/O x 3, no focal deficits  Psychiatric: Normal mood, normal affect  : No Nunez  Skin: No rashes  Access: Not applicable  Back: No CVA tenderness    LABS:                        11.6   8.47  )-----------( 314      ( 08 May 2024 16:58 )             35.0     05-08    143  |  105  |  20  ----------------------------<  97  4.0   |  25  |  0.88    Ca    9.3      08 May 2024 16:58  Mg     2.0     05-08    TPro  7.2  /  Alb  4.5  /  TBili  0.3  /  DBili  x   /  AST  24  /  ALT  34  /  AlkPhos  79  05-08          RADIOLOGY & ADDITIONAL STUDIES:

## 2024-05-10 NOTE — PROGRESS NOTE ADULT - ASSESSMENT
pt with gist on ct  will need eus and possible tatto and resection  no acute gi complaints  can wait until seizure and prostate evalatued  appreciate all services  seizure last pm? 
Patient presents with  concern for convulsive episode followed by confusion, i/s/o possible UTI in patient with no hx of seizure, hx of melanoma.     Impression:  concern for convulsive episode followed by confusion, i/s/o possible UTI. EEG with R frontotemporal discharges. 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    Plan  [] monitored off ASM on vEEG, no events so far  [] vEEG will be completed today if no further events  [] likely DC home today if no further events on vEEG  [] Seizure, fall and aspiration precautions. Avoid sleep deprivation.   [] IV lorazepam 1mg IV prn for GTC seizure activity lasting >3min with vital changes; repeat after 1 minute (max dose 0.1 mg/kg)     [x] MRI w/wo, small chronic lacunar infarct in the right cerebellum - patient on aspirin and atorvastatin  [x] paraneoplastic panel sent by outpatient provider    [x] Given concern for seizure, advised the patient with regards to risks and driving privileges associated with the [place] Guidelines. Advised patient regarding the risk of seizures and general seizure safety recommendations including not to be bathing alone, climbing to high places and operating heavy machinery, until cleared by follow-up outpatient Neurology. Reinforced the importance of compliance with medications. Discuss sleep hygiene and the risks of sleep disruption. Discuss the risk of death associated with seizures / SUDEP.     [] Please note: if patient has a convulsion, please document length of episode, specifically what patient was doing paying attention to eye opening vs closure, gaze deviation, shaking of extremities, tongue bite, urinary incontinence, any derangement of vital signs.       [] GI following patient for ?GIST workup. Per notes, may need EUS and possible tattoo/resection at some point, but currently no acute GI complaints and GI procedures can be deferred till seizure evaluation complete.      * Case and plan not final until Attending attestation *  
pt well known to me, likely gist tumor on ct  incidental finding  will d/w advanced team re: eus possible tatto   and dr. moon re: operative mangment

## 2024-05-10 NOTE — EEG REPORT - NS EEG TEXT BOX
DAY 2 	START: 5/9/2024  08:00     	END: 5/10/2024  08:00  	DURATION: 24 HR      DAILY EEG VISUAL ANALYSIS    The background was continuous, symmetric, spontaneously variable and reactive. During wakefulness, the posterior dominant rhythm consisted of symmetric, well-modulated 11 Hz activity, with amplitude to 30 uV, that attenuated to eye opening.  Low amplitude frontal beta was noted in wakefulness.   The anterior to posterior gradient was present.     BACKGROUND SLOWING:  Diffuse theta and delta frequencies were present.    FOCAL SLOWING:   None was present.    SLEEP BACKGROUND:  Drowsiness was characterized by fragmentation, attenuation, and slowing of the background activity.    Sleep was characterized by the presence of vertex waves, symmetric sleep spindles and K-complexes.    OTHER NON-EPILEPTIFORM FINDINGS:  None were present.    ACTIVATION PROCEDURES:   Hyperventilation was not performed.    Photic stimulation was not performed.    INTERICTAL EPILEPTIFORM ACTIVITY:   Rare right frontotemporal discharges with variable maximum, at times frontal (Fp2/F4/F8) maximum and at times inferior temporal (F8/F10/T10)    EVENTS:  No events or seizures recorded.    ARTIFACTS:  Intermittent myogenic and movement artifacts were noted.    ECG:  The heart rate on single channel ECG was predominantly between 60 to 80 BPM.    ASMs: none      -------------------------------------------------------------------------------------------------------------------------------------------------------  EEG SUMMARY:  Abnormal EEG in the awake, drowsy and asleep states.  -	Rare right frontotemporal discharges with variable maximum, at times frontal and at times inferior temporal    -------------------------------------------------------------------------------------------------------------------------------------------------------  IMPRESSION/CLINICAL CORRELATE:  This is an abnormal EEG record.   -	Risk for focal onset seizures arising from the right frontotemporal region.     -------------------------------------------------------------------------------------------------------------------------------------------------------  Olga Perez MD  Fellow, [facility] [place]   DAY 2 	START: 5/9/2024  08:00     	END: 5/10/2024  08:00  	DURATION: 24 HR      DAILY EEG VISUAL ANALYSIS    The background was continuous, symmetric, spontaneously variable and reactive. During wakefulness, the posterior dominant rhythm consisted of symmetric, well-modulated 11 Hz activity, with amplitude to 30 uV, that attenuated to eye opening.  Low amplitude frontal beta was noted in wakefulness.   The anterior to posterior gradient was present.     BACKGROUND SLOWING:  Diffuse theta and delta frequencies were present.    FOCAL SLOWING:   None was present.    SLEEP BACKGROUND:  Drowsiness was characterized by fragmentation, attenuation, and slowing of the background activity.    Sleep was characterized by the presence of vertex waves, symmetric sleep spindles and K-complexes.    OTHER NON-EPILEPTIFORM FINDINGS:  None were present.    ACTIVATION PROCEDURES:   Hyperventilation was not performed.    Photic stimulation was not performed.    INTERICTAL EPILEPTIFORM ACTIVITY:   Rare right frontotemporal discharges with variable maximum, at times frontal (Fp2/F4/F8) maximum and at times inferior temporal (F8/F10/T10)    EVENTS:  No events or seizures recorded.    ARTIFACTS:  Intermittent myogenic and movement artifacts were noted.    ECG:  The heart rate on single channel ECG was predominantly between 60 to 80 BPM.    ASMs: none      -------------------------------------------------------------------------------------------------------------------------------------------------------  EEG SUMMARY:  Abnormal EEG in the awake, drowsy and asleep states.  -	Rare right frontotemporal discharges with variable maximum, at times frontal and at times inferior temporal    -------------------------------------------------------------------------------------------------------------------------------------------------------  IMPRESSION/CLINICAL CORRELATE:  This is an abnormal EEG record.   -	Risk for focal onset seizures arising from the right frontotemporal region.     -------------------------------------------------------------------------------------------------------------------------------------------------------  MD Richard Pollard MD FAES  Director, Continuous EEG Monitoring Program, [facility]   and Epilepsy Fellowship ,   Department of Neurology, [facility] [employer]    [facility] EEG Reading Room Ph#: [phone]  Epilepsy Answering Service after 5PM and before 8:30AM: Ph#: [phone]    Fellow, [facility] [place]     DAY 2 	START: 5/9/2024  08:00     	END: 5/10/2024  14:00  	DURATION: 30 HR        DAILY EEG VISUAL ANALYSIS    The background was continuous, symmetric, spontaneously variable and reactive. During wakefulness, the posterior dominant rhythm consisted of symmetric, well-modulated 11 Hz activity, with amplitude to 30 uV, that attenuated to eye opening.  Low amplitude frontal beta was noted in wakefulness.   The anterior to posterior gradient was present.     BACKGROUND SLOWING:  Diffuse theta and delta frequencies were present.    FOCAL SLOWING:   None was present.    SLEEP BACKGROUND:  Drowsiness was characterized by fragmentation, attenuation, and slowing of the background activity.    Sleep was characterized by the presence of vertex waves, symmetric sleep spindles and K-complexes.    OTHER NON-EPILEPTIFORM FINDINGS:  None were present.    ACTIVATION PROCEDURES:   Hyperventilation was not performed.    Photic stimulation was not performed.    INTERICTAL EPILEPTIFORM ACTIVITY:   Rare right frontotemporal discharges with variable maximum, at times frontal (Fp2/F4/F8) maximum and at times inferior temporal (F8/F10/T10)    EVENTS:  No events or seizures recorded.    ARTIFACTS:  Intermittent myogenic and movement artifacts were noted.    ECG:  The heart rate on single channel ECG was predominantly between 60 to 80 BPM.    ASMs: none      -------------------------------------------------------------------------------------------------------------------------------------------------------  EEG SUMMARY:  Abnormal EEG in the awake, drowsy and asleep states.  -	Rare right frontotemporal discharges with variable maximum, at times frontal and at times inferior temporal    -------------------------------------------------------------------------------------------------------------------------------------------------------  IMPRESSION/CLINICAL CORRELATE:  This is an abnormal EEG record.   -	Risk for focal onset seizures arising from the right frontotemporal region.     -------------------------------------------------------------------------------------------------------------------------------------------------------  MD Richard Pollard MD FAES  Director, Continuous EEG Monitoring Program, [facility]   and Epilepsy Fellowship ,   Department of Neurology, [facility] [employer]    [facility] EEG Reading Room Ph#: [phone]  Epilepsy Answering Service after 5PM and before 8:30AM: Ph#: [phone]    Fellow, [facility]ile[facility] [place]

## 2024-05-11 LAB — LEVETIRACETAM SERPL-MCNC: 11.8 UG/ML — SIGNIFICANT CHANGE UP (ref 10–40)

## 2024-05-15 ENCOUNTER — NON-APPOINTMENT (OUTPATIENT)
Age: 69
End: 2024-05-15

## 2024-05-15 DIAGNOSIS — C49.A0 GASTROINTESTINAL STOMACL TUMOR,UNSPECIFIED SITE: ICD-10-CM

## 2024-05-17 ENCOUNTER — APPOINTMENT (OUTPATIENT)
Dept: NEUROLOGY | Facility: CLINIC | Age: 69
End: 2024-05-17
Payer: MEDICARE

## 2024-05-17 VITALS
DIASTOLIC BLOOD PRESSURE: 77 MMHG | BODY MASS INDEX: 24.64 KG/M2 | HEIGHT: 74 IN | SYSTOLIC BLOOD PRESSURE: 127 MMHG | HEART RATE: 71 BPM | WEIGHT: 192 LBS

## 2024-05-17 DIAGNOSIS — Z86.73 PERSONAL HISTORY OF TRANSIENT ISCHEMIC ATTACK (TIA), AND CEREBRAL INFARCTION W/OUT RESIDUAL DEFICITS: ICD-10-CM

## 2024-05-17 LAB
AMPHIPHYSIN IGG TITR SER IF: NEGATIVE
ANNOTATION COMMENT IMP: NORMAL
CV2 IGG TITR SER: NEGATIVE
FOLATE SERPL-MCNC: 14.6 NG/ML
GLIAL NUC TYPE 1 AB TITR SER: NEGATIVE
HCYS SERPL-MCNC: 11 UMOL/L
HU1 AB TITR SER: NEGATIVE
HU2 AB TITR SER IF: NEGATIVE
HU3 AB TITR SER: NEGATIVE
INTERPRETIVE COMMENTS: NORMAL
METHYLMALONATE SERPL-SCNC: 206 NMOL/L
PCA-1 AB TITR SER: NEGATIVE
PCA-2 AB TITR SER: NEGATIVE
PCA-TR AB TITR SER: NEGATIVE
TSH SERPL-ACNC: 2.24 UIU/ML
VGCC-P/Q BIND AB SER-SCNC: 0 NMOL/L
VGKC AB SER-SCNC: 0 NMOL/L
VIT B12 SERPL-MCNC: 733 PG/ML

## 2024-05-17 PROCEDURE — 99214 OFFICE O/P EST MOD 30 MIN: CPT

## 2024-05-17 RX ORDER — MIDAZOLAM 5 MG/.1ML
5 SPRAY NASAL
Qty: 1 | Refills: 1 | Status: ACTIVE | COMMUNITY
Start: 2024-05-17 | End: 1900-01-01

## 2024-05-17 RX ORDER — ATORVASTATIN CALCIUM 40 MG/1
40 TABLET, FILM COATED ORAL DAILY
Qty: 90 | Refills: 3 | Status: ACTIVE | COMMUNITY
Start: 2024-05-17 | End: 1900-01-01

## 2024-05-22 ENCOUNTER — APPOINTMENT (OUTPATIENT)
Dept: ORTHOPEDIC SURGERY | Facility: CLINIC | Age: 69
End: 2024-05-22

## 2024-05-31 NOTE — HISTORY OF PRESENT ILLNESS
[FreeTextEntry1] : 67 yo man with recent new onset GTC seizure.    He was admitted to the EMU and right frontotemporal sharp waves were recorded interictally, and Keppra was continued.  He is doing well, no seizures, no intolerable side effects.   MRI brain c/s marla did not show any related structural abnormality (it does report a small lacunar infarct in right cerebellum, but unclear if this might be a virchow-booker space, he is still on Aspirin and Atorvastatin, will refer to vascular neurology for opinion).  MRI lumbar spine c/s marla showed lumbar soft tissue fluid collections suggestive of ganglion cysts.    Labs were WNL and paraneoplastic Ab panel was negative.    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

## 2024-05-31 NOTE — ASSESSMENT
[FreeTextEntry1] : 69 yo man with increased risk for focal onset seizures, EEG demonstrating sharp waves in right frontotemporal region. Scheduled to have GIST tumor resected in near future.  No clear connection between the GIST tumor and recent new onset seizure (paraneoplastic Ab negative), although there is one report in the literature of a patient who experienced a seizure in setting of an infected GIST tumor (with abscess formation and bacteremia and hyponatremia).  Plan: 1. Continue LEV 750mg PO BID 2. Refer to vascular neurology to review brain MRI reporting small lacunar infarct in right cerebellum (vs VR space?) 3. Seizure precautions, including no driving or operating heavy equipment, no unsupervised swimming, using a shower instead of a bathtub, no climbing ladders or working at elevations, no use of sharp dangerous tools or devices.  4. Patient agrees with plan.  5. Follow up in 3 months.  Darrin Woody MD [facility]  Greater than 50% of the encounter was spent on counseling and coordination of care discussing differential diagnosis, diagnostic testing, and treatment options. We have talked about appropriate follow up, and I have spent 25 minutes of face to face time with the patient.  More than 50% of time spent counseling and educating patient about epilepsy specific safety issues including ASM side effects and interactions, alcohol consumption, sleep deprivation, risks and driving privileges associated with the [place] Guidelines, what is SUDEP and death related to seizures/SUDEP, seizure 1st aid and risks. Patient is educated on seizure precautions, including no driving, no operating machinery, no swimming or bathing, no climbing heights, or engage in any risky activities during which a seizure could cause further injury to pt or others.

## 2024-06-25 ENCOUNTER — APPOINTMENT (OUTPATIENT)
Dept: NEUROLOGY | Facility: CLINIC | Age: 69
End: 2024-06-25
Payer: MEDICARE

## 2024-06-25 ENCOUNTER — APPOINTMENT (OUTPATIENT)
Dept: NEUROLOGY | Facility: CLINIC | Age: 69
End: 2024-06-25

## 2024-06-25 VITALS
HEART RATE: 64 BPM | SYSTOLIC BLOOD PRESSURE: 152 MMHG | WEIGHT: 197 LBS | HEIGHT: 74 IN | BODY MASS INDEX: 25.28 KG/M2 | DIASTOLIC BLOOD PRESSURE: 88 MMHG

## 2024-06-25 DIAGNOSIS — I63.9 CEREBRAL INFARCTION, UNSPECIFIED: ICD-10-CM

## 2024-06-25 PROCEDURE — 93880 EXTRACRANIAL BILAT STUDY: CPT

## 2024-06-25 PROCEDURE — 93886 INTRACRANIAL COMPLETE STUDY: CPT

## 2024-06-25 PROCEDURE — 99205 OFFICE O/P NEW HI 60 MIN: CPT

## 2024-06-25 PROCEDURE — 93892 TCD EMBOLI DETECT W/O INJ: CPT

## 2024-06-25 RX ORDER — DIBASIC SODIUM PHOSPHATE, MONOBASIC SODIUM PHOSPHATE 7; 19 G/118ML; G/118ML
7-19 ENEMA RECTAL
Qty: 1 | Refills: 0 | Status: DISCONTINUED | COMMUNITY
Start: 2022-02-12 | End: 2024-06-25

## 2024-06-25 RX ORDER — ASPIRIN 325 MG/1
325 TABLET, FILM COATED ORAL
Qty: 16 | Refills: 0 | Status: DISCONTINUED | COMMUNITY
Start: 2022-04-13 | End: 2024-06-25

## 2024-06-25 RX ORDER — TIZANIDINE 2 MG/1
2 TABLET ORAL EVERY 6 HOURS
Qty: 24 | Refills: 1 | Status: DISCONTINUED | COMMUNITY
Start: 2022-02-12 | End: 2024-06-25

## 2024-06-25 RX ORDER — DOCUSATE SODIUM 100 MG/1
100 CAPSULE ORAL TWICE DAILY
Qty: 14 | Refills: 0 | Status: DISCONTINUED | COMMUNITY
Start: 2022-03-07 | End: 2024-06-25

## 2024-06-25 RX ORDER — CICLOPIROX OLAMINE 7.7 MG/G
0.77 CREAM TOPICAL
Qty: 30 | Refills: 0 | Status: DISCONTINUED | COMMUNITY
Start: 2018-08-19 | End: 2024-06-25

## 2024-06-25 RX ORDER — DICLOFENAC SODIUM 75 MG/1
75 TABLET, DELAYED RELEASE ORAL
Qty: 60 | Refills: 1 | Status: DISCONTINUED | COMMUNITY
Start: 2022-01-24 | End: 2024-06-25

## 2024-06-25 RX ORDER — HYDROCORTISONE 25 MG/G
2.5 CREAM TOPICAL
Qty: 1 | Refills: 5 | Status: DISCONTINUED | COMMUNITY
Start: 2022-02-24 | End: 2024-06-25

## 2024-06-25 RX ORDER — PREDNISONE 10 MG/1
10 TABLET ORAL
Qty: 39 | Refills: 0 | Status: DISCONTINUED | COMMUNITY
Start: 2022-02-12 | End: 2024-06-25

## 2024-06-25 RX ORDER — DOCUSATE SODIUM 100 MG/1
100 CAPSULE ORAL TWICE DAILY
Qty: 30 | Refills: 0 | Status: DISCONTINUED | COMMUNITY
Start: 2022-02-12 | End: 2024-06-25

## 2024-06-27 ENCOUNTER — TRANSCRIPTION ENCOUNTER (OUTPATIENT)
Age: 69
End: 2024-06-27

## 2024-06-27 ENCOUNTER — OUTPATIENT (OUTPATIENT)
Dept: OUTPATIENT SERVICES | Facility: HOSPITAL | Age: 69
LOS: 1 days | End: 2024-06-27
Payer: MEDICARE

## 2024-06-27 ENCOUNTER — APPOINTMENT (OUTPATIENT)
Dept: GASTROENTEROLOGY | Facility: HOSPITAL | Age: 69
End: 2024-06-27

## 2024-06-27 VITALS
TEMPERATURE: 98 F | WEIGHT: 197.09 LBS | HEIGHT: 74 IN | RESPIRATION RATE: 20 BRPM | DIASTOLIC BLOOD PRESSURE: 79 MMHG | SYSTOLIC BLOOD PRESSURE: 141 MMHG | OXYGEN SATURATION: 100 % | HEART RATE: 63 BPM

## 2024-06-27 VITALS
SYSTOLIC BLOOD PRESSURE: 131 MMHG | HEART RATE: 68 BPM | OXYGEN SATURATION: 95 % | RESPIRATION RATE: 20 BRPM | DIASTOLIC BLOOD PRESSURE: 69 MMHG

## 2024-06-27 DIAGNOSIS — Z98.890 OTHER SPECIFIED POSTPROCEDURAL STATES: Chronic | ICD-10-CM

## 2024-06-27 DIAGNOSIS — C49.A0 GASTROINTESTINAL STROMAL TUMOR, UNSPECIFIED SITE: ICD-10-CM

## 2024-06-27 PROCEDURE — 88342 IMHCHEM/IMCYTCHM 1ST ANTB: CPT | Mod: 26

## 2024-06-27 PROCEDURE — 43242 EGD US FINE NEEDLE BX/ASPIR: CPT

## 2024-06-27 PROCEDURE — 88305 TISSUE EXAM BY PATHOLOGIST: CPT

## 2024-06-27 PROCEDURE — 88341 IMHCHEM/IMCYTCHM EA ADD ANTB: CPT | Mod: 26

## 2024-06-27 PROCEDURE — 43242 EGD US FINE NEEDLE BX/ASPIR: CPT | Mod: GC

## 2024-06-27 PROCEDURE — 88341 IMHCHEM/IMCYTCHM EA ADD ANTB: CPT

## 2024-06-27 PROCEDURE — 88342 IMHCHEM/IMCYTCHM 1ST ANTB: CPT

## 2024-06-27 PROCEDURE — 88305 TISSUE EXAM BY PATHOLOGIST: CPT | Mod: 26

## 2024-06-27 RX ORDER — ALFUZOSIN HYDROCHLORIDE 10 MG/1
1 TABLET, EXTENDED RELEASE ORAL
Refills: 0 | DISCHARGE

## 2024-06-27 RX ORDER — SODIUM CHLORIDE 0.9 % (FLUSH) 0.9 %
500 SYRINGE (ML) INJECTION
Refills: 0 | Status: DISCONTINUED | OUTPATIENT
Start: 2024-06-27 | End: 2024-07-11

## 2024-06-27 RX ORDER — CHOLECALCIFEROL (VITAMIN D3) 125 MCG
1 CAPSULE ORAL
Refills: 0 | DISCHARGE

## 2024-06-27 RX ORDER — FINASTERIDE 5 MG/1
1 TABLET, FILM COATED ORAL
Refills: 0 | DISCHARGE

## 2024-06-27 RX ORDER — OMEPRAZOLE 10 MG/1
1 CAPSULE, DELAYED RELEASE ORAL
Qty: 0 | Refills: 0 | DISCHARGE

## 2024-06-27 RX ORDER — IRON POLYSACCHARIDE COMPLEX 150 MG
0 CAPSULE ORAL
Refills: 0 | DISCHARGE

## 2024-07-09 LAB — SURGICAL PATHOLOGY STUDY: SIGNIFICANT CHANGE UP

## 2024-07-29 ENCOUNTER — APPOINTMENT (OUTPATIENT)
Dept: SURGERY | Facility: CLINIC | Age: 69
End: 2024-07-29
Payer: COMMERCIAL

## 2024-07-29 VITALS
SYSTOLIC BLOOD PRESSURE: 123 MMHG | RESPIRATION RATE: 16 BRPM | BODY MASS INDEX: 25.41 KG/M2 | OXYGEN SATURATION: 99 % | WEIGHT: 198 LBS | DIASTOLIC BLOOD PRESSURE: 81 MMHG | TEMPERATURE: 96 F | HEIGHT: 74 IN | HEART RATE: 62 BPM

## 2024-07-29 DIAGNOSIS — K31.89 OTHER DISEASES OF STOMACH AND DUODENUM: ICD-10-CM

## 2024-07-29 DIAGNOSIS — K76.9 LIVER DISEASE, UNSPECIFIED: ICD-10-CM

## 2024-07-29 PROCEDURE — 99205 OFFICE O/P NEW HI 60 MIN: CPT

## 2024-07-29 PROCEDURE — 99215 OFFICE O/P EST HI 40 MIN: CPT

## 2024-07-29 NOTE — PHYSICAL EXAM
[No Rash or Lesion] : No rash or lesion [de-identified] : well-appearing, appears stated age, no acute distress  [de-identified] : sclerae anicteric, mucous membranes moist, normocephalic, trachea midline  [de-identified] : normal respirations and chest expansion  [de-identified] : soft, nontender, nondistended

## 2024-07-29 NOTE — HISTORY OF PRESENT ILLNESS
[de-identified] : 68-year-old male referred by Dr. Ernie Jordan of GI for evaluation for resection of gastric mass.  Mr. Gregory is a healthy 68-year-old male with past history of benign prostatic hyperplasia, who initially presented to [facility] a little over 3 months ago with his first seizure.  During his workup, CT of the abdomen was performed, which demonstrated incidental finding of an approximately 2 cm exophytic mass of the posterior wall of the stomach.  He had recently undergone upper endoscopy with no acute findings.  He later underwent EUS with biopsy with Dr. Flynn at [facility], with findings consistent with a possible schwannoma.  The patient reports no GI symptoms currently.  He has normal appetite, is eating normally, and reports no dysphagia or abdominal pain.  He has normal bowel movements.  He was recently seen by urology and underwent cystoscopy, as his abdominal CT also showed thickening of the bladder wall.  He was told no further intervention was needed, and was started on finasteride for BPH. The CT scan also demonstrated some low attenuating liver lesions without further characterization.   EUS on 6/27/2024: Large submucosal mass with no bleeding and no stigmata of recent bleeding was found on the posterior wall of the gastric antrum. Endosonographically, the round intramural, subepithelial lesion was found in the antrum of the stomach.  The lesion was hypoechoic and appeared to originate from the muscularis propria.  The lesion measured 22 mm x 21 mm.  Outer endosonographic borders were well defined.  An intact interface was seen between the mass and the adjacent structures suggesting a lack of invasion.  Fine-needle biopsy was performed. Pathology: Spindle cell lesion with atypia.  Stains favor schwannoma (positive S100, negative )  Past medical history: Seizure d/o, BPH, high cholesterol, history of melanoma diagnosed in 2002 status post neck dissection and chemo Past surgical history: Neck dissection, back surgery, inguinal hernia repair

## 2024-07-29 NOTE — ASSESSMENT
[FreeTextEntry1] : 68-year-old gentleman with 2.2 cm exophytic lesion of the gastric antrum on the posterior wall, with FNA biopsy favoring schwannoma.  Given the size of the lesion, and in order to obtain a definitive diagnosis surgical excision is recommended. I have offered minimally invasive excision of the lesion with partial gastrectomy. I discussed with him risks and benefits of surgery, with risks not limited to bleeding, infection, leak. He has agreed to proceed.

## 2024-07-29 NOTE — DATA REVIEWED
[FreeTextEntry1] : CT abdomen pelvis 4/28/2024.  I have done an independent interpretation of radiologic studies as described above, including reviewing radiologic images.

## 2024-07-29 NOTE — PLAN
[FreeTextEntry1] : [] Laparoscopic partial gastrectomy to be scheduled [] Medical, cardiology, and neurology clearance requested preoperatively [] MRI of the liver has been ordered to better characterize liver lesion seen on CT scan

## 2024-07-29 NOTE — CONSULT LETTER
[Dear  ___] : Dear  [unfilled], [Consult Letter:] : I had the pleasure of evaluating your patient, [unfilled]. [Please see my note below.] : Please see my note below. [Consult Closing:] : Thank you very much for allowing me to participate in the care of this patient.  If you have any questions, please do not hesitate to contact me. [Sincerely,] : Sincerely, [FreeTextEntry2] : Dr. Ernie Jordan [FreeTextEntry3] : Quetnin Moncada MD, FACS, FASMBS [facility] [address] [place] [address] tel. [phone] fax [phone]

## 2024-07-30 ENCOUNTER — APPOINTMENT (OUTPATIENT)
Dept: SURGICAL ONCOLOGY | Facility: CLINIC | Age: 69
End: 2024-07-30

## 2024-08-01 ENCOUNTER — NON-APPOINTMENT (OUTPATIENT)
Age: 69
End: 2024-08-01

## 2024-08-06 ENCOUNTER — OUTPATIENT (OUTPATIENT)
Dept: OUTPATIENT SERVICES | Facility: HOSPITAL | Age: 69
LOS: 1 days | End: 2024-08-06
Payer: MEDICARE

## 2024-08-06 ENCOUNTER — APPOINTMENT (OUTPATIENT)
Dept: MRI IMAGING | Facility: CLINIC | Age: 69
End: 2024-08-06

## 2024-08-06 VITALS
TEMPERATURE: 98 F | HEART RATE: 63 BPM | HEIGHT: 74 IN | DIASTOLIC BLOOD PRESSURE: 78 MMHG | OXYGEN SATURATION: 96 % | RESPIRATION RATE: 18 BRPM | WEIGHT: 203.05 LBS | SYSTOLIC BLOOD PRESSURE: 126 MMHG

## 2024-08-06 DIAGNOSIS — Z29.9 ENCOUNTER FOR PROPHYLACTIC MEASURES, UNSPECIFIED: ICD-10-CM

## 2024-08-06 DIAGNOSIS — Z01.818 ENCOUNTER FOR OTHER PREPROCEDURAL EXAMINATION: ICD-10-CM

## 2024-08-06 DIAGNOSIS — Z98.890 OTHER SPECIFIED POSTPROCEDURAL STATES: Chronic | ICD-10-CM

## 2024-08-06 DIAGNOSIS — R93.5 ABNORMAL FINDINGS ON DIAGNOSTIC IMAGING OF OTHER ABDOMINAL REGIONS, INCLUDING RETROPERITONEUM: ICD-10-CM

## 2024-08-06 DIAGNOSIS — Z87.898 PERSONAL HISTORY OF OTHER SPECIFIED CONDITIONS: ICD-10-CM

## 2024-08-06 DIAGNOSIS — K31.89 OTHER DISEASES OF STOMACH AND DUODENUM: ICD-10-CM

## 2024-08-06 LAB
ANION GAP SERPL CALC-SCNC: 13 MMOL/L — SIGNIFICANT CHANGE UP (ref 5–17)
BLD GP AB SCN SERPL QL: NEGATIVE — SIGNIFICANT CHANGE UP
BUN SERPL-MCNC: 20 MG/DL — SIGNIFICANT CHANGE UP (ref 7–23)
CALCIUM SERPL-MCNC: 9.6 MG/DL — SIGNIFICANT CHANGE UP (ref 8.4–10.5)
CHLORIDE SERPL-SCNC: 107 MMOL/L — SIGNIFICANT CHANGE UP (ref 96–108)
CO2 SERPL-SCNC: 24 MMOL/L — SIGNIFICANT CHANGE UP (ref 22–31)
CREAT SERPL-MCNC: 1.04 MG/DL — SIGNIFICANT CHANGE UP (ref 0.5–1.3)
EGFR: 79 ML/MIN/1.73M2 — SIGNIFICANT CHANGE UP
GLUCOSE SERPL-MCNC: 101 MG/DL — HIGH (ref 70–99)
HCT VFR BLD CALC: 35.7 % — LOW (ref 39–50)
HCV AB S/CO SERPL IA: 0.06 S/CO — SIGNIFICANT CHANGE UP
HCV AB SERPL-IMP: SIGNIFICANT CHANGE UP
HGB BLD-MCNC: 11.8 G/DL — LOW (ref 13–17)
MCHC RBC-ENTMCNC: 27.6 PG — SIGNIFICANT CHANGE UP (ref 27–34)
MCHC RBC-ENTMCNC: 33.1 GM/DL — SIGNIFICANT CHANGE UP (ref 32–36)
MCV RBC AUTO: 83.4 FL — SIGNIFICANT CHANGE UP (ref 80–100)
NRBC # BLD: 0 /100 WBCS — SIGNIFICANT CHANGE UP (ref 0–0)
PLATELET # BLD AUTO: 209 K/UL — SIGNIFICANT CHANGE UP (ref 150–400)
POTASSIUM SERPL-MCNC: 4.2 MMOL/L — SIGNIFICANT CHANGE UP (ref 3.5–5.3)
POTASSIUM SERPL-SCNC: 4.2 MMOL/L — SIGNIFICANT CHANGE UP (ref 3.5–5.3)
RBC # BLD: 4.28 M/UL — SIGNIFICANT CHANGE UP (ref 4.2–5.8)
RBC # FLD: 13.2 % — SIGNIFICANT CHANGE UP (ref 10.3–14.5)
RH IG SCN BLD-IMP: NEGATIVE — SIGNIFICANT CHANGE UP
SODIUM SERPL-SCNC: 144 MMOL/L — SIGNIFICANT CHANGE UP (ref 135–145)
WBC # BLD: 7.47 K/UL — SIGNIFICANT CHANGE UP (ref 3.8–10.5)
WBC # FLD AUTO: 7.47 K/UL — SIGNIFICANT CHANGE UP (ref 3.8–10.5)

## 2024-08-06 PROCEDURE — 74183 MRI ABD W/O CNTR FLWD CNTR: CPT

## 2024-08-06 PROCEDURE — 86803 HEPATITIS C AB TEST: CPT

## 2024-08-06 PROCEDURE — G0463: CPT

## 2024-08-06 PROCEDURE — 86901 BLOOD TYPING SEROLOGIC RH(D): CPT

## 2024-08-06 PROCEDURE — 86900 BLOOD TYPING SEROLOGIC ABO: CPT

## 2024-08-06 PROCEDURE — A9585: CPT | Mod: JW

## 2024-08-06 PROCEDURE — 83036 HEMOGLOBIN GLYCOSYLATED A1C: CPT

## 2024-08-06 PROCEDURE — 85027 COMPLETE CBC AUTOMATED: CPT

## 2024-08-06 PROCEDURE — 80048 BASIC METABOLIC PNL TOTAL CA: CPT

## 2024-08-06 PROCEDURE — 86850 RBC ANTIBODY SCREEN: CPT

## 2024-08-06 NOTE — H&P PST ADULT - ASSESSMENT
DASI Score:7.44  DASI Activity: Pt state able to play softball, goes fishing, perform all ADL's without assistance, able to go up one flight of stairs or walk 1-2 blocks with out difficulty  Loose or removable teeth: denies   CAPRINI SCORE    AGE RELATED RISK FACTORS                                                             [ ] Age 41-60 years                                            (1 Point)  [x ] Age: 61-74 years                                           (2 Points)                 [ ] Age= 75 years                                                (3 Points)             DISEASE RELATED RISK FACTORS                                                       [ ] Edema in the lower extremities                 (1 Point)                     [x ] Varicose veins                                               (1 Point)                                 [ x] BMI > 25 Kg/m2                                            (1 Point)                                  [ ] Serious infection (ie PNA)                            (1 Point)                     [ ] Lung disease ( COPD, Emphysema)            (1 Point)                                                                          [ ] Acute myocardial infarction                         (1 Point)                  [ ] Congestive heart failure (in the previous month)  (1 Point)         [ ] Inflammatory bowel disease                            (1 Point)                  [ ] Central venous access, PICC or Port               (2 points)       (within the last month)                                                                [ ] Stroke (in the previous month)                        (5 Points)    [ ] Previous or present malignancy                       (2 points)                                                                                                                                                         HEMATOLOGY RELATED FACTORS                                                         [ ] Prior episodes of VTE                                     (3 Points)                     [ ] Positive family history for VTE                      (3 Points)                  [ ] Prothrombin 75165 A                                     (3 Points)                     [ ] Factor V Leiden                                                (3 Points)                        [ ] Lupus anticoagulants                                      (3 Points)                                                           [ ] Anticardiolipin antibodies                              (3 Points)                                                       [ ] High homocysteine in the blood                   (3 Points)                                             [ ] Other congenital or acquired thrombophilia      (3 Points)                                                [ ] Heparin induced thrombocytopenia                  (3 Points)                                        MOBILITY RELATED FACTORS  [ ] Bed rest                                                         (1 Point)  [ ] Plaster cast                                                    (2 points)  [ ] Bed bound for more than 72 hours           (2 Points)    GENDER SPECIFIC FACTORS  [ ] Pregnancy or had a baby within the last month   (1 Point)  [ ] Post-partum < 6 weeks                                   (1 Point)  [ ] Hormonal therapy  or oral contraception   (1 Point)  [ ] History of pregnancy complications              (1 point)  [ ] Unexplained or recurrent              (1 Point)    OTHER RISK FACTORS                                           (1 Point)  [ ] BMI >40, smoking, diabetes requiring insulin, chemotherapy  blood transfusions and length of surgery over 2 hours    SURGERY RELATED RISK FACTORS  [ ]  Section within the last month     (1 Point)  [ ] Minor surgery                                                  (1 Point)  [ ] Arthroscopic surgery                                       (2 Points)  [x ] Planned major surgery lasting more            (2 Points)      than 45 minutes     [ ] Elective hip or knee joint replacement       (5 points)       surgery                                                TRAUMA RELATED RISK FACTORS  [ ] Fracture of the hip, pelvis, or leg                       (5 Points)  [ ] Spinal cord injury resulting in paralysis             (5 points)       (in the previous month)    [ ] Paralysis  (less than 1 month)                             (5 Points)  [ ] Multiple Trauma within 1 month                        (5 Points)    Total Score [6  ]

## 2024-08-06 NOTE — H&P PST ADULT - NSICDXPASTSURGICALHX_GEN_ALL_CORE_FT
PAST SURGICAL HISTORY:  H/O colonoscopy     H/O hemorrhoidectomy     S/P hernia repair     S/P lumbar laminectomy     Status post dilation of esophageal narrowing

## 2024-08-06 NOTE — H&P PST ADULT - PROBLEM SELECTOR PLAN 3
Caprini Score 3-6: Moderate Risk , pharmacologic VTE prophylaxis is indicated for most patients (in the absence of contraindications)

## 2024-08-06 NOTE — H&P PST ADULT - PROBLEM SELECTOR PLAN 1
Pt. scheduled for Laparoscopic Resection of Gastric Mass with Possible Upper Endoscopy with Dr. Moncada on 8/14/24.  Pre-op instructions given, all questions answered.  Surgical Soap given.  Labs: CBC, BMP, T&S, A1C, Hep-C     .pt instructed to continue ASA 81mg DOS.

## 2024-08-06 NOTE — H&P PST ADULT - HISTORY OF PRESENT ILLNESS
68 year old male with  PMhx  Gerd, BPH, Seizure (first and only episode in April 2024), moderate regurgitation, MVP, BPH. PShx hernia repair, laminectomy, C/o incidental finding of gastric lesion. Pt states during his seizure workup, CT of the abdomen was performed, which demonstrated incidental finding of an approximately 2 cm exophytic mass of the posterior wall of the stomach. He had recently undergone upper endoscopy with no acute findings. He later underwent EUS with biopsy with Dr. Flynn at [facility], with findings consistent with a possible schwannoma. The patient reports no GI symptoms currently. He has normal appetite, is eating normally, and reports no dysphagia or abdominal pain. He has normal bowel movements. Denies any chest pain, palpitations, SOB, N/V, fever or chills. He now presents to PST prior to scheduled Laparoscopic Resection of Gastric Mass with Possible Upper Endoscopy with Dr. Moncada on 8/14/24.  ?

## 2024-08-07 PROBLEM — N40.0 BENIGN PROSTATIC HYPERPLASIA WITHOUT LOWER URINARY TRACT SYMPTOMS: Chronic | Status: INACTIVE | Noted: 2022-02-12 | Resolved: 2024-08-06

## 2024-08-07 PROBLEM — K21.9 GASTRO-ESOPHAGEAL REFLUX DISEASE WITHOUT ESOPHAGITIS: Chronic | Status: INACTIVE | Noted: 2022-03-02 | Resolved: 2024-08-06

## 2024-08-07 LAB
A1C WITH ESTIMATED AVERAGE GLUCOSE RESULT: 5.5 % — SIGNIFICANT CHANGE UP (ref 4–5.6)
ESTIMATED AVERAGE GLUCOSE: 111 MG/DL — SIGNIFICANT CHANGE UP (ref 68–114)

## 2024-08-12 ENCOUNTER — RX RENEWAL (OUTPATIENT)
Age: 69
End: 2024-08-12

## 2024-08-13 ENCOUNTER — TRANSCRIPTION ENCOUNTER (OUTPATIENT)
Age: 69
End: 2024-08-13

## 2024-08-14 ENCOUNTER — TRANSCRIPTION ENCOUNTER (OUTPATIENT)
Age: 69
End: 2024-08-14

## 2024-08-14 ENCOUNTER — RESULT REVIEW (OUTPATIENT)
Age: 69
End: 2024-08-14

## 2024-08-14 ENCOUNTER — APPOINTMENT (OUTPATIENT)
Dept: SURGERY | Facility: HOSPITAL | Age: 69
End: 2024-08-14
Payer: MEDICARE

## 2024-08-14 PROCEDURE — 43659 UNLISTED LAPS PX STOMACH: CPT

## 2024-08-19 ENCOUNTER — APPOINTMENT (OUTPATIENT)
Dept: SURGERY | Facility: CLINIC | Age: 69
End: 2024-08-19
Payer: MEDICARE

## 2024-08-19 ENCOUNTER — APPOINTMENT (OUTPATIENT)
Dept: ORTHOPEDIC SURGERY | Facility: CLINIC | Age: 69
End: 2024-08-19
Payer: MEDICARE

## 2024-08-19 ENCOUNTER — APPOINTMENT (OUTPATIENT)
Dept: NEUROLOGY | Facility: CLINIC | Age: 69
End: 2024-08-19

## 2024-08-19 VITALS
TEMPERATURE: 98.7 F | DIASTOLIC BLOOD PRESSURE: 84 MMHG | SYSTOLIC BLOOD PRESSURE: 145 MMHG | RESPIRATION RATE: 17 BRPM | OXYGEN SATURATION: 98 % | HEART RATE: 72 BPM

## 2024-08-19 VITALS
HEIGHT: 74 IN | DIASTOLIC BLOOD PRESSURE: 84 MMHG | SYSTOLIC BLOOD PRESSURE: 132 MMHG | WEIGHT: 198 LBS | BODY MASS INDEX: 25.41 KG/M2

## 2024-08-19 DIAGNOSIS — Z90.3 ACQUIRED ABSENCE OF STOMACH [PART OF]: ICD-10-CM

## 2024-08-19 DIAGNOSIS — M51.36 OTHER INTERVERTEBRAL DISC DEGENERATION, LUMBAR REGION: ICD-10-CM

## 2024-08-19 PROCEDURE — 99214 OFFICE O/P EST MOD 30 MIN: CPT

## 2024-08-19 PROCEDURE — 99024 POSTOP FOLLOW-UP VISIT: CPT

## 2024-08-19 NOTE — PHYSICAL EXAM
[No Rash or Lesion] : No rash or lesion [de-identified] : well-appearing, appears stated age, no acute distress  [de-identified] : sclerae anicteric, mucous membranes moist, normocephalic, trachea midline  [de-identified] : normal respirations and chest expansion  [de-identified] : soft, nontender, nondistended.  Incisions well-healed without erythema or drainage.  Healing ecchymosis around umbilical incision site.

## 2024-08-19 NOTE — PLAN
[FreeTextEntry1] : Advance to pureed diet x 1 week, then can advance to soft diet Dietary instructions explained in detail to the patient and his wife Avoid heavy lifting Follow-up 2 weeks

## 2024-08-19 NOTE — ASSESSMENT
[FreeTextEntry1] : 68-year-old male recovering well status post laparoscopic excision of gastric mass on 8/14/2024.

## 2024-08-19 NOTE — HISTORY OF PRESENT ILLNESS
[de-identified] : Patient is a 68 year old male who presents for f/u evaluation. Today, patient had an episode of severe bp after reaching out for something in front of him. He recently had a laparoscopic abdominal surgery, and is recovering well., incisions healing.  05.08.24 Today the patient states that he is recovering from a seizure episode approximately 2 weeks ago.  He had complications in terms of infection and sepsis.  Thankfully since that admission to [facility] he has overall recovered clinically.  He is being followed by neurologist for his seizure.  He denies any current bowel bladder issues.  He denies any saddle anesthesia.  He does describe intermittent buttock pain.

## 2024-08-19 NOTE — HISTORY OF PRESENT ILLNESS
[de-identified] : GABE is a 68 year old male s/p Laparoscopic excision of gastric mass. 08/14/24. He has been recovering uneventfully postoperatively.  He reports no incisional pain.  His postoperative bloating and shoulder pain have resolved.  He was having some constipation, but that resolved after taking magnesium citrate.  He did experience back pain similar to previous episodes of back pain a few days ago, which is improved but not completely resolved.  Has an appointment with his spine doctor later today.  He reports no nausea and no dysphagia.  He has been adhering to a liquid diet.

## 2024-08-19 NOTE — ADDENDUM
[FreeTextEntry1] :  I, Kerline Armstrong, acted solely as a scribe for Dr. Ramírez Murray MD on this date 08/19/2024   All medical record entries made by the Scribe were at my, Dr. Ramírez Murray MD., direction and personally dictated by me on 08/19/2024. I have reviewed the chart and agree that the record accurately reflects my personal performance of the history, physical exam, assessment and plan. I have also personally directed, reviewed, and agreed with the chart.

## 2024-08-19 NOTE — ASSESSMENT
[FreeTextEntry1] : I had a lengthy discussion with the patient in regard to treatment plan and diagnosis. There are no red flag findings on imaging nor are there any red flag findings on clinical exams. Therefore, we will proceed with a course of conservative treatment. This would include physical therapy/home exercise program, Tylenol, NSAIDs as medically indicated. The patient will follow up with me in approximately 2 weeks. I encouraged the patient to follow-up sooner if there are any new or worsening symptoms.

## 2024-08-19 NOTE — PHYSICAL EXAM
[de-identified] : Lumbar Physical Exam  Gait - Normal  Station - Normal  Sagittal balance - Normal  Compensatory mechanism? - None  Heel walk - Normal  Toe walk - Normal  Reflexes Patellar - normal Gastroc - normal Clonus - No  Hip Exam - Normal  Straight leg raise - none  Pulses - 2+ dp/pt  Range of motion - normal  Sensation  Sensation intact to light touch in L1, L2, L3, L4, L5 and S1 dermatomes bilaterally, left foot numbness  Motor 	IP	Quad	HS	TA	Gastroc	EHL Right	5/5	5/5	5/5	5/5	5/5	5/5 Left	5/5	5/5	5/5	5/5	5/5	5/5  Incisions over abdomen healing well [de-identified] : Lumbar radiographs Coronal degenerative scoliosis noted No severe spondylolisthesis  Lumbar MRI Severe spinal stenosis at L1-L2 L2-L4 with moderate to severe areas of spinal stenosis  Lumbar MRI with and without contrast reviewed Central decompression is complete from L1 down to L5 There are residual areas of foraminal stenosis to be expected after central decompression  New lumbar MRI with and without contrast reviewed Central decompression is complete There are some areas of residual lateral recess stenosis, foraminal stenosis  2 MRIs from 2024 were reviewed I see no areas of critical central stenosis He does have foraminal stenosis consistent with his limited decompression procedure I see no obvious signs of infection based on these 2 MRIs

## 2024-08-25 ENCOUNTER — NON-APPOINTMENT (OUTPATIENT)
Age: 69
End: 2024-08-25

## 2024-08-26 ENCOUNTER — APPOINTMENT (OUTPATIENT)
Dept: NEUROLOGY | Facility: CLINIC | Age: 69
End: 2024-08-26
Payer: MEDICARE

## 2024-08-26 VITALS — BODY MASS INDEX: 24.38 KG/M2 | WEIGHT: 190 LBS | HEIGHT: 74 IN

## 2024-08-26 VITALS — SYSTOLIC BLOOD PRESSURE: 110 MMHG | HEART RATE: 71 BPM | DIASTOLIC BLOOD PRESSURE: 73 MMHG

## 2024-08-26 PROCEDURE — 99214 OFFICE O/P EST MOD 30 MIN: CPT

## 2024-08-26 NOTE — ASSESSMENT
[FreeTextEntry1] : 67 yo man with increased risk for focal onset seizures, EEG demonstrating sharp waves in right frontotemporal region.  Plan: 1. Continue LEV 750mg PO BID 2. Seizure precautions, including no driving or operating heavy equipment, no unsupervised swimming, using a shower instead of a bathtub, no climbing ladders or working at elevations, no use of sharp dangerous tools or devices. 3. Patient agrees with plan. 4. Follow up in 6 months.  Darrin Woody MD [facility]  I have spent 30 minutes of time for this encounter.   More than 50% of time spent counseling and educating patient about epilepsy specific safety issues including ASM side effects and interactions, alcohol consumption, sleep deprivation, risks and driving privileges associated with the [place] Guidelines, what is SUDEP and death related to seizures/SUDEP, seizure 1st aid and risks. Patient is educated on seizure precautions, including no driving, no operating machinery, no swimming or bathing, no climbing heights, or engage in any risky activities during which a seizure could cause further injury to pt or others.

## 2024-08-26 NOTE — HISTORY OF PRESENT ILLNESS
[FreeTextEntry1] : 69 yo man with increased risk for focal onset seizures, EEG demonstrating sharp waves in right frontotemporal region.  Since last visit, no seizures, taking LEV compliantly.    He is very frustrated by the seizure precautions (no driving), and this has affected his mood negatively.  He is also recovering from his gastric tumor resection which is frustrating.  He does not feel that the Keppra has affected his mood negatively (he wants to continue it).

## 2024-09-04 ENCOUNTER — APPOINTMENT (OUTPATIENT)
Dept: ORTHOPEDIC SURGERY | Facility: CLINIC | Age: 69
End: 2024-09-04

## 2024-09-06 ENCOUNTER — APPOINTMENT (OUTPATIENT)
Dept: SURGERY | Facility: CLINIC | Age: 69
End: 2024-09-06
Payer: MEDICARE

## 2024-09-06 VITALS
TEMPERATURE: 97.3 F | RESPIRATION RATE: 16 BRPM | OXYGEN SATURATION: 99 % | SYSTOLIC BLOOD PRESSURE: 136 MMHG | BODY MASS INDEX: 24.38 KG/M2 | HEART RATE: 62 BPM | DIASTOLIC BLOOD PRESSURE: 76 MMHG | HEIGHT: 74 IN | WEIGHT: 190 LBS

## 2024-09-06 DIAGNOSIS — Z90.3 ACQUIRED ABSENCE OF STOMACH [PART OF]: ICD-10-CM

## 2024-09-06 PROCEDURE — 99024 POSTOP FOLLOW-UP VISIT: CPT

## 2024-09-06 NOTE — HISTORY OF PRESENT ILLNESS
[de-identified] : GABE is a 68 year old male s/p Laparoscopic excision of gastric mass. 08/14/24. He is progressing well, tolerating a regular diet, and reports no pain.  He has normal bowel movements. Pathology report was discussed with the patient previously, consistent with schwannoma, as expected.

## 2024-09-06 NOTE — ASSESSMENT
[FreeTextEntry1] : 68-year-old male recovering well status post laparoscopic excision of gastric mass on 8/14/2024. Pathology consistent with 2.4 cm schwannoma.

## 2024-09-06 NOTE — PHYSICAL EXAM
[No Rash or Lesion] : No rash or lesion [de-identified] : well-appearing, appears stated age, no acute distress  [de-identified] : sclerae anicteric, mucous membranes moist, normocephalic, trachea midline  [de-identified] : normal respirations and chest expansion  [de-identified] : soft, nontender, nondistended.  Incisions well-healed without erythema or drainage.

## 2024-09-06 NOTE — CONSULT LETTER
[Dear  ___] : Dear  [unfilled], [Courtesy Letter:] : I had the pleasure of seeing your patient, [unfilled], in my office today. [Please see my note below.] : Please see my note below. [Consult Closing:] : Thank you very much for allowing me to participate in the care of this patient.  If you have any questions, please do not hesitate to contact me. [Sincerely,] : Sincerely, [FreeTextEntry2] : Dr. Ernie Jordan [FreeTextEntry1] : As you know, he is status post laparoscopic excision of gastric mass on 8/14/2024.  Pathology report was consistent with 2.4 cm schwannoma, as expected.  He is recovering well postoperatively, tolerating a regular diet, and having normal bowel function. [FreeTextEntry3] : Quentin Moncada MD, FACS, FASMBS [facility] [address] [place] [address] tel. [phone] fax [phone]

## 2024-09-06 NOTE — PHYSICAL EXAM
[No Rash or Lesion] : No rash or lesion [de-identified] : well-appearing, appears stated age, no acute distress  [de-identified] : sclerae anicteric, mucous membranes moist, normocephalic, trachea midline  [de-identified] : normal respirations and chest expansion  [de-identified] : soft, nontender, nondistended.  Incisions well-healed without erythema or drainage.

## 2024-09-06 NOTE — HISTORY OF PRESENT ILLNESS
[de-identified] : GABE is a 68 year old male s/p Laparoscopic excision of gastric mass. 08/14/24. He is progressing well, tolerating a regular diet, and reports no pain.  He has normal bowel movements. Pathology report was discussed with the patient previously, consistent with schwannoma, as expected.

## 2024-09-06 NOTE — CONSULT LETTER
[Dear  ___] : Dear  [unfilled], [Courtesy Letter:] : I had the pleasure of seeing your patient, [unfilled], in my office today. [Please see my note below.] : Please see my note below. [Consult Closing:] : Thank you very much for allowing me to participate in the care of this patient.  If you have any questions, please do not hesitate to contact me. [Sincerely,] : Sincerely, [FreeTextEntry2] : Dr. Ernie Jordan [FreeTextEntry1] : As you know, he is status post laparoscopic excision of gastric mass on 8/14/2024.  Pathology report was consistent with 2.4 cm schwannoma, as expected.  He is recovering well postoperatively, tolerating a regular diet, and having normal bowel function. [FreeTextEntry3] : Quentin Moncada MD, FACS, FASMBS [facility] [address] [place] [address] tel. [phone] fax [phone]

## 2025-03-07 ENCOUNTER — NON-APPOINTMENT (OUTPATIENT)
Age: 70
End: 2025-03-07

## 2025-03-10 ENCOUNTER — APPOINTMENT (OUTPATIENT)
Dept: NEUROLOGY | Facility: CLINIC | Age: 70
End: 2025-03-10
Payer: MEDICARE

## 2025-03-10 VITALS
WEIGHT: 190 LBS | BODY MASS INDEX: 24.38 KG/M2 | HEIGHT: 74 IN | SYSTOLIC BLOOD PRESSURE: 119 MMHG | DIASTOLIC BLOOD PRESSURE: 80 MMHG | HEART RATE: 80 BPM

## 2025-03-10 PROCEDURE — G2211 COMPLEX E/M VISIT ADD ON: CPT

## 2025-03-10 PROCEDURE — 99214 OFFICE O/P EST MOD 30 MIN: CPT

## 2025-05-28 ENCOUNTER — RX RENEWAL (OUTPATIENT)
Age: 70
End: 2025-05-28

## 2025-05-28 RX ORDER — ASPIRIN 81 MG/1
81 TABLET, CHEWABLE ORAL
Qty: 90 | Refills: 3 | Status: ACTIVE | COMMUNITY
Start: 2025-05-28 | End: 1900-01-01

## 2025-07-21 ENCOUNTER — RX RENEWAL (OUTPATIENT)
Age: 70
End: 2025-07-21

## (undated) DEVICE — TUBING SUCTION CONN 6FT STERILE

## (undated) DEVICE — GLV 8.5 PROTEXIS (BLUE)

## (undated) DEVICE — POSITIONER FOAM EGG CRATE ULNAR 2PCS (PINK)

## (undated) DEVICE — NDL SPINAL 18G X 3.5" (PINK)

## (undated) DEVICE — DRAPE C ARM UNIVERSAL

## (undated) DEVICE — PACK IV START WITH CHG

## (undated) DEVICE — SUT BIOSYN 4-0 18" P-12

## (undated) DEVICE — DRAPE C ARM C-ARMOUR

## (undated) DEVICE — SPECIMEN CONTAINER 100ML

## (undated) DEVICE — TUBING IV SET GRAVITY 3Y 100" MACRO

## (undated) DEVICE — MIDAS REX MR7 LUBRICANT DIFFUSER CARTRIDGE

## (undated) DEVICE — ELCTR BAYONET PENCIL

## (undated) DEVICE — GLV 8 PROTEXIS (WHITE)

## (undated) DEVICE — DRAPE MICROSCOPE OPMI VISIONGUARD 48X118"

## (undated) DEVICE — FOLEY HOLDER STATLOCK 2 WAY ADULT

## (undated) DEVICE — VENODYNE/SCD SLEEVE CALF LARGE

## (undated) DEVICE — MIDAS REX MR8 BALL FLUTED LG BORE 5MM X 14CM

## (undated) DEVICE — SUCTION YANKAUER NO CONTROL VENT

## (undated) DEVICE — DRSG DERMABOND 0.7ML

## (undated) DEVICE — SOL IRR POUR NS 0.9% 500ML

## (undated) DEVICE — BIPOLAR FORCEP MEDTRONIC BAYONET STRAIGHT

## (undated) DEVICE — GLV 8.5 PROTEXIS (WHITE)

## (undated) DEVICE — SOL INJ NS 0.9% 500ML 2 PORT

## (undated) DEVICE — DRAPE 3/4 SHEET W REINFORCEMENT 56X77"

## (undated) DEVICE — DRSG TELFA 3 X 8

## (undated) DEVICE — SYR LUER LOK 20CC

## (undated) DEVICE — PACK LUMBAR LAMI

## (undated) DEVICE — POSITIONER CUSHION INSERT PRONE VIEW LG

## (undated) DEVICE — TUBING SUCTION 20FT

## (undated) DEVICE — BITE BLOCK ADULT 20 X 27MM (GREEN)

## (undated) DEVICE — BALLOON US ENDO

## (undated) DEVICE — DRSG TEGADERM 6"X8"

## (undated) DEVICE — Device

## (undated) DEVICE — SUT POLYSORB 0 36" GU-46

## (undated) DEVICE — SENSOR O2 FINGER ADULT

## (undated) DEVICE — NDL ACQUIRE EUS FNB 22GA STRL

## (undated) DEVICE — CATH IV SAFE BC 20G X 1.16" (PINK)

## (undated) DEVICE — PREP CHLORAPREP HI-LITE ORANGE 26ML

## (undated) DEVICE — DRSG STERISTRIPS 0.5 X 4"

## (undated) DEVICE — CATH IV SAFE BC 22G X 1" (BLUE)

## (undated) DEVICE — SOL IRR POUR H2O 250ML

## (undated) DEVICE — FOLEY TRAY 16FR LF URINE METER SURESTEP

## (undated) DEVICE — MIDAS REX MR8 MATCH HEAD FLUTED LG BORE 3MM X 14CM

## (undated) DEVICE — SUT VICRYL 2-0 18" CP-2 UNDYED (POP-OFF)

## (undated) DEVICE — SYR ALLIANCE II INFLATION 60ML

## (undated) DEVICE — SUT VICRYL 0 18" OS-6 (POP-OFF)